# Patient Record
Sex: FEMALE | Race: WHITE | NOT HISPANIC OR LATINO | Employment: FULL TIME | URBAN - METROPOLITAN AREA
[De-identification: names, ages, dates, MRNs, and addresses within clinical notes are randomized per-mention and may not be internally consistent; named-entity substitution may affect disease eponyms.]

---

## 2017-01-10 ENCOUNTER — APPOINTMENT (OUTPATIENT)
Dept: LAB | Facility: HOSPITAL | Age: 32
End: 2017-01-10
Attending: OBSTETRICS & GYNECOLOGY
Payer: COMMERCIAL

## 2017-01-10 DIAGNOSIS — Z34.90 ENCOUNTER FOR SUPERVISION OF NORMAL PREGNANCY: ICD-10-CM

## 2017-01-10 LAB
ABO GROUP BLD: NORMAL
BACTERIA UR QL AUTO: ABNORMAL /HPF
BASOPHILS # BLD AUTO: 0.02 THOUSANDS/ΜL (ref 0–0.1)
BASOPHILS NFR BLD AUTO: 0 % (ref 0–1)
BILIRUB UR QL STRIP: ABNORMAL
BLD GP AB SCN SERPL QL: NEGATIVE
CLARITY UR: CLEAR
COLOR UR: YELLOW
EOSINOPHIL # BLD AUTO: 0.1 THOUSAND/ΜL (ref 0–0.61)
EOSINOPHIL NFR BLD AUTO: 1 % (ref 0–6)
ERYTHROCYTE [DISTWIDTH] IN BLOOD BY AUTOMATED COUNT: 14.3 % (ref 11.6–15.1)
GLUCOSE UR STRIP-MCNC: NEGATIVE MG/DL
HCT VFR BLD AUTO: 37.8 % (ref 34.8–46.1)
HGB BLD-MCNC: 13.1 G/DL (ref 11.5–15.4)
HGB UR QL STRIP.AUTO: ABNORMAL
KETONES UR STRIP-MCNC: NEGATIVE MG/DL
LEUKOCYTE ESTERASE UR QL STRIP: NEGATIVE
LYMPHOCYTES # BLD AUTO: 2.62 THOUSANDS/ΜL (ref 0.6–4.47)
LYMPHOCYTES NFR BLD AUTO: 26 % (ref 14–44)
MCH RBC QN AUTO: 32.4 PG (ref 26.8–34.3)
MCHC RBC AUTO-ENTMCNC: 34.7 G/DL (ref 31.4–37.4)
MCV RBC AUTO: 94 FL (ref 82–98)
MONOCYTES # BLD AUTO: 0.54 THOUSAND/ΜL (ref 0.17–1.22)
MONOCYTES NFR BLD AUTO: 5 % (ref 4–12)
NEUTROPHILS # BLD AUTO: 6.76 THOUSANDS/ΜL (ref 1.85–7.62)
NEUTS SEG NFR BLD AUTO: 68 % (ref 43–75)
NITRITE UR QL STRIP: NEGATIVE
NON-SQ EPI CELLS URNS QL MICRO: ABNORMAL /HPF
NRBC BLD AUTO-RTO: 0 /100 WBCS
PH UR STRIP.AUTO: 6 [PH] (ref 4.5–8)
PLATELET # BLD AUTO: 254 THOUSANDS/UL (ref 149–390)
PMV BLD AUTO: 11 FL (ref 8.9–12.7)
PROT UR STRIP-MCNC: NEGATIVE MG/DL
RBC # BLD AUTO: 4.04 MILLION/UL (ref 3.81–5.12)
RBC #/AREA URNS AUTO: ABNORMAL /HPF
RH BLD: POSITIVE
SP GR UR STRIP.AUTO: 1.01 (ref 1–1.03)
UROBILINOGEN UR QL STRIP.AUTO: 0.2 E.U./DL
WBC # BLD AUTO: 10.04 THOUSAND/UL (ref 4.31–10.16)
WBC #/AREA URNS AUTO: ABNORMAL /HPF

## 2017-01-10 PROCEDURE — 80081 OBSTETRIC PANEL INC HIV TSTG: CPT

## 2017-01-10 PROCEDURE — 36415 COLL VENOUS BLD VENIPUNCTURE: CPT

## 2017-01-10 PROCEDURE — 81001 URINALYSIS AUTO W/SCOPE: CPT

## 2017-01-10 PROCEDURE — 87086 URINE CULTURE/COLONY COUNT: CPT

## 2017-01-11 LAB
HBV SURFACE AG SER QL: NORMAL
HIV 1+2 AB+HIV1 P24 AG SERPL QL IA: NORMAL
RPR SER QL: NORMAL
RUBV IGG SERPL IA-ACNC: 102.7 IU/ML

## 2017-01-12 LAB — BACTERIA UR CULT: NORMAL

## 2017-01-16 ENCOUNTER — ALLSCRIPTS OFFICE VISIT (OUTPATIENT)
Dept: OTHER | Facility: OTHER | Age: 32
End: 2017-01-16

## 2017-01-16 ENCOUNTER — GENERIC CONVERSION - ENCOUNTER (OUTPATIENT)
Dept: OTHER | Facility: OTHER | Age: 32
End: 2017-01-16

## 2017-01-16 PROCEDURE — G0145 SCR C/V CYTO,THINLAYER,RESCR: HCPCS | Performed by: OBSTETRICS & GYNECOLOGY

## 2017-01-17 ENCOUNTER — LAB REQUISITION (OUTPATIENT)
Dept: LAB | Facility: HOSPITAL | Age: 32
End: 2017-01-17
Payer: COMMERCIAL

## 2017-01-17 DIAGNOSIS — Z34.90 ENCOUNTER FOR SUPERVISION OF NORMAL PREGNANCY: ICD-10-CM

## 2017-01-17 PROCEDURE — 87491 CHLMYD TRACH DNA AMP PROBE: CPT | Performed by: OBSTETRICS & GYNECOLOGY

## 2017-01-17 PROCEDURE — 87591 N.GONORRHOEAE DNA AMP PROB: CPT | Performed by: OBSTETRICS & GYNECOLOGY

## 2017-01-18 LAB
CHLAMYDIA DNA CVX QL NAA+PROBE: NORMAL
N GONORRHOEA DNA GENITAL QL NAA+PROBE: NORMAL

## 2017-01-24 LAB
LAB AP GYN PRIMARY INTERPRETATION: NORMAL
LAB AP LMP: NORMAL
Lab: NORMAL
PATH INTERP SPEC-IMP: NORMAL

## 2017-01-25 ENCOUNTER — GENERIC CONVERSION - ENCOUNTER (OUTPATIENT)
Dept: OTHER | Facility: OTHER | Age: 32
End: 2017-01-25

## 2017-02-07 ENCOUNTER — APPOINTMENT (OUTPATIENT)
Dept: LAB | Facility: HOSPITAL | Age: 32
End: 2017-02-07
Attending: OBSTETRICS & GYNECOLOGY
Payer: COMMERCIAL

## 2017-02-07 ENCOUNTER — TRANSCRIBE ORDERS (OUTPATIENT)
Dept: ADMINISTRATIVE | Facility: HOSPITAL | Age: 32
End: 2017-02-07

## 2017-02-07 ENCOUNTER — GENERIC CONVERSION - ENCOUNTER (OUTPATIENT)
Dept: OTHER | Facility: OTHER | Age: 32
End: 2017-02-07

## 2017-02-07 DIAGNOSIS — Z20.828 EXPOSURE TO SARS-ASSOCIATED CORONAVIRUS: Primary | ICD-10-CM

## 2017-02-07 DIAGNOSIS — Z20.828 EXPOSURE TO SARS-ASSOCIATED CORONAVIRUS: ICD-10-CM

## 2017-02-07 PROCEDURE — 36415 COLL VENOUS BLD VENIPUNCTURE: CPT

## 2017-02-07 PROCEDURE — 86747 PARVOVIRUS ANTIBODY: CPT

## 2017-02-08 LAB
B19V IGG SER IA-ACNC: 0.1 INDEX (ref 0–0.8)
B19V IGM SER IA-ACNC: 0.1 INDEX (ref 0–0.8)

## 2017-02-09 ENCOUNTER — GENERIC CONVERSION - ENCOUNTER (OUTPATIENT)
Dept: OTHER | Facility: OTHER | Age: 32
End: 2017-02-09

## 2017-02-13 ENCOUNTER — ALLSCRIPTS OFFICE VISIT (OUTPATIENT)
Dept: OTHER | Facility: OTHER | Age: 32
End: 2017-02-13

## 2017-02-27 ENCOUNTER — GENERIC CONVERSION - ENCOUNTER (OUTPATIENT)
Dept: OTHER | Facility: OTHER | Age: 32
End: 2017-02-27

## 2017-02-27 ENCOUNTER — ALLSCRIPTS OFFICE VISIT (OUTPATIENT)
Dept: PERINATAL CARE | Facility: CLINIC | Age: 32
End: 2017-02-27
Payer: COMMERCIAL

## 2017-02-27 PROCEDURE — 76817 TRANSVAGINAL US OBSTETRIC: CPT | Performed by: OBSTETRICS & GYNECOLOGY

## 2017-02-27 PROCEDURE — 76805 OB US >/= 14 WKS SNGL FETUS: CPT | Performed by: OBSTETRICS & GYNECOLOGY

## 2017-03-08 ENCOUNTER — LAB (OUTPATIENT)
Dept: LAB | Facility: HOSPITAL | Age: 32
End: 2017-03-08
Attending: OBSTETRICS & GYNECOLOGY
Payer: COMMERCIAL

## 2017-03-08 ENCOUNTER — TRANSCRIBE ORDERS (OUTPATIENT)
Dept: ADMINISTRATIVE | Facility: HOSPITAL | Age: 32
End: 2017-03-08

## 2017-03-08 DIAGNOSIS — Z20.828 EXPOSURE TO SARS-ASSOCIATED CORONAVIRUS: ICD-10-CM

## 2017-03-08 DIAGNOSIS — Z20.828 EXPOSURE TO SARS-ASSOCIATED CORONAVIRUS: Primary | ICD-10-CM

## 2017-03-08 PROCEDURE — 36415 COLL VENOUS BLD VENIPUNCTURE: CPT

## 2017-03-08 PROCEDURE — 86747 PARVOVIRUS ANTIBODY: CPT

## 2017-03-10 LAB
B19V IGG SER IA-ACNC: 0.6 INDEX (ref 0–0.8)
B19V IGM SER IA-ACNC: 0.1 INDEX (ref 0–0.8)

## 2017-03-13 ENCOUNTER — GENERIC CONVERSION - ENCOUNTER (OUTPATIENT)
Dept: OTHER | Facility: OTHER | Age: 32
End: 2017-03-13

## 2017-03-13 DIAGNOSIS — Z33.1 PREGNANT STATE, INCIDENTAL: ICD-10-CM

## 2017-04-10 ENCOUNTER — GENERIC CONVERSION - ENCOUNTER (OUTPATIENT)
Dept: OTHER | Facility: OTHER | Age: 32
End: 2017-04-10

## 2017-04-10 ENCOUNTER — ALLSCRIPTS OFFICE VISIT (OUTPATIENT)
Dept: OTHER | Facility: OTHER | Age: 32
End: 2017-04-10

## 2017-04-11 ENCOUNTER — APPOINTMENT (OUTPATIENT)
Dept: LAB | Facility: HOSPITAL | Age: 32
End: 2017-04-11
Attending: OBSTETRICS & GYNECOLOGY
Payer: COMMERCIAL

## 2017-04-11 DIAGNOSIS — Z33.1 PREGNANT STATE, INCIDENTAL: ICD-10-CM

## 2017-04-11 LAB
ERYTHROCYTE [DISTWIDTH] IN BLOOD BY AUTOMATED COUNT: 13.4 % (ref 11.6–15.1)
GLUCOSE 1H P 50 G GLC PO SERPL-MCNC: 115 MG/DL
HCT VFR BLD AUTO: 33.2 % (ref 34.8–46.1)
HGB BLD-MCNC: 11.2 G/DL (ref 11.5–15.4)
MCH RBC QN AUTO: 32 PG (ref 26.8–34.3)
MCHC RBC AUTO-ENTMCNC: 33.7 G/DL (ref 31.4–37.4)
MCV RBC AUTO: 95 FL (ref 82–98)
PLATELET # BLD AUTO: 218 THOUSANDS/UL (ref 149–390)
PMV BLD AUTO: 12.6 FL (ref 8.9–12.7)
RBC # BLD AUTO: 3.5 MILLION/UL (ref 3.81–5.12)
WBC # BLD AUTO: 11.61 THOUSAND/UL (ref 4.31–10.16)

## 2017-04-11 PROCEDURE — 86592 SYPHILIS TEST NON-TREP QUAL: CPT

## 2017-04-11 PROCEDURE — 82950 GLUCOSE TEST: CPT

## 2017-04-11 PROCEDURE — 36415 COLL VENOUS BLD VENIPUNCTURE: CPT

## 2017-04-11 PROCEDURE — 85027 COMPLETE CBC AUTOMATED: CPT

## 2017-04-12 LAB — RPR SER QL: NORMAL

## 2017-04-24 ENCOUNTER — GENERIC CONVERSION - ENCOUNTER (OUTPATIENT)
Dept: OTHER | Facility: OTHER | Age: 32
End: 2017-04-24

## 2017-05-04 ENCOUNTER — GENERIC CONVERSION - ENCOUNTER (OUTPATIENT)
Dept: OTHER | Facility: OTHER | Age: 32
End: 2017-05-04

## 2017-05-09 ENCOUNTER — GENERIC CONVERSION - ENCOUNTER (OUTPATIENT)
Dept: OTHER | Facility: OTHER | Age: 32
End: 2017-05-09

## 2017-05-23 ENCOUNTER — GENERIC CONVERSION - ENCOUNTER (OUTPATIENT)
Dept: OTHER | Facility: OTHER | Age: 32
End: 2017-05-23

## 2017-05-23 ENCOUNTER — ALLSCRIPTS OFFICE VISIT (OUTPATIENT)
Dept: OTHER | Facility: OTHER | Age: 32
End: 2017-05-23

## 2017-06-06 ENCOUNTER — GENERIC CONVERSION - ENCOUNTER (OUTPATIENT)
Dept: OTHER | Facility: OTHER | Age: 32
End: 2017-06-06

## 2017-06-21 ENCOUNTER — ALLSCRIPTS OFFICE VISIT (OUTPATIENT)
Dept: OTHER | Facility: OTHER | Age: 32
End: 2017-06-21

## 2017-06-21 ENCOUNTER — LAB REQUISITION (OUTPATIENT)
Dept: LAB | Facility: HOSPITAL | Age: 32
End: 2017-06-21
Payer: COMMERCIAL

## 2017-06-21 ENCOUNTER — GENERIC CONVERSION - ENCOUNTER (OUTPATIENT)
Dept: OTHER | Facility: OTHER | Age: 32
End: 2017-06-21

## 2017-06-21 DIAGNOSIS — Z34.90 ENCOUNTER FOR SUPERVISION OF NORMAL PREGNANCY: ICD-10-CM

## 2017-06-21 PROCEDURE — 87653 STREP B DNA AMP PROBE: CPT | Performed by: OBSTETRICS & GYNECOLOGY

## 2017-06-22 ENCOUNTER — GENERIC CONVERSION - ENCOUNTER (OUTPATIENT)
Dept: OTHER | Facility: OTHER | Age: 32
End: 2017-06-22

## 2017-06-23 LAB — GP B STREP DNA SPEC QL NAA+PROBE: NORMAL

## 2017-06-27 ENCOUNTER — GENERIC CONVERSION - ENCOUNTER (OUTPATIENT)
Dept: OTHER | Facility: OTHER | Age: 32
End: 2017-06-27

## 2017-07-03 ENCOUNTER — HOSPITAL ENCOUNTER (INPATIENT)
Facility: HOSPITAL | Age: 32
LOS: 2 days | Discharge: HOME | End: 2017-07-05
Attending: OBSTETRICS & GYNECOLOGY | Admitting: OBSTETRICS & GYNECOLOGY
Payer: COMMERCIAL

## 2017-07-03 ENCOUNTER — GENERIC CONVERSION - ENCOUNTER (OUTPATIENT)
Dept: OTHER | Facility: OTHER | Age: 32
End: 2017-07-03

## 2017-07-03 DIAGNOSIS — Z3A.38 38 WEEKS GESTATION OF PREGNANCY: Primary | ICD-10-CM

## 2017-07-03 LAB
ABO GROUP BLD: NORMAL
BASE EXCESS BLDCOA CALC-SCNC: -6.8 MMOL/L (ref 3–11)
BASE EXCESS BLDCOV CALC-SCNC: -3.6 MMOL/L (ref 1–9)
BASOPHILS # BLD AUTO: 0.03 THOUSANDS/ΜL (ref 0–0.1)
BASOPHILS NFR BLD AUTO: 0 % (ref 0–1)
BLD GP AB SCN SERPL QL: NEGATIVE
EOSINOPHIL # BLD AUTO: 0.11 THOUSAND/ΜL (ref 0–0.61)
EOSINOPHIL NFR BLD AUTO: 1 % (ref 0–6)
ERYTHROCYTE [DISTWIDTH] IN BLOOD BY AUTOMATED COUNT: 15.3 % (ref 11.6–15.1)
HCO3 BLDCOA-SCNC: 22.5 MMOL/L (ref 17.3–27.3)
HCO3 BLDCOV-SCNC: 21.9 MMOL/L (ref 12.2–28.6)
HCT VFR BLD AUTO: 29.5 % (ref 34.8–46.1)
HGB BLD-MCNC: 9.8 G/DL (ref 11.5–15.4)
LYMPHOCYTES # BLD AUTO: 3.37 THOUSANDS/ΜL (ref 0.6–4.47)
LYMPHOCYTES NFR BLD AUTO: 21 % (ref 14–44)
MCH RBC QN AUTO: 28.1 PG (ref 26.8–34.3)
MCHC RBC AUTO-ENTMCNC: 33.2 G/DL (ref 31.4–37.4)
MCV RBC AUTO: 85 FL (ref 82–98)
MONOCYTES # BLD AUTO: 0.87 THOUSAND/ΜL (ref 0.17–1.22)
MONOCYTES NFR BLD AUTO: 5 % (ref 4–12)
NEUTROPHILS # BLD AUTO: 11.7 THOUSANDS/ΜL (ref 1.85–7.62)
NEUTS SEG NFR BLD AUTO: 73 % (ref 43–75)
NRBC BLD AUTO-RTO: 0 /100 WBCS
O2 CT VFR BLDCOA CALC: 11.7 ML/DL
OXYHGB MFR BLDCOA: 51.9 %
OXYHGB MFR BLDCOV: 65.9 %
PCO2 BLDCOA: 60.4 MM[HG] (ref 30–60)
PCO2 BLDCOV: 41 MM HG (ref 27–43)
PH BLDCOA: 7.19 [PH] (ref 7.23–7.43)
PH BLDCOV: 7.34 [PH] (ref 7.19–7.49)
PLATELET # BLD AUTO: 293 THOUSANDS/UL (ref 149–390)
PMV BLD AUTO: 10.8 FL (ref 8.9–12.7)
PO2 BLDCOA: 28.9 MM HG (ref 5–25)
PO2 BLDCOV: 30.3 MM HG (ref 15–45)
RBC # BLD AUTO: 3.49 MILLION/UL (ref 3.81–5.12)
RH BLD: POSITIVE
SAO2 % BLDCOV: 15.9 ML/DL
SPECIMEN EXPIRATION DATE: NORMAL
WBC # BLD AUTO: 16.08 THOUSAND/UL (ref 4.31–10.16)

## 2017-07-03 PROCEDURE — 85025 COMPLETE CBC W/AUTO DIFF WBC: CPT | Performed by: OBSTETRICS & GYNECOLOGY

## 2017-07-03 PROCEDURE — 82805 BLOOD GASES W/O2 SATURATION: CPT | Performed by: OBSTETRICS & GYNECOLOGY

## 2017-07-03 PROCEDURE — 86901 BLOOD TYPING SEROLOGIC RH(D): CPT | Performed by: OBSTETRICS & GYNECOLOGY

## 2017-07-03 PROCEDURE — 86900 BLOOD TYPING SEROLOGIC ABO: CPT | Performed by: OBSTETRICS & GYNECOLOGY

## 2017-07-03 PROCEDURE — 0HQ9XZZ REPAIR PERINEUM SKIN, EXTERNAL APPROACH: ICD-10-PCS | Performed by: OBSTETRICS & GYNECOLOGY

## 2017-07-03 PROCEDURE — 86850 RBC ANTIBODY SCREEN: CPT | Performed by: OBSTETRICS & GYNECOLOGY

## 2017-07-03 PROCEDURE — 86592 SYPHILIS TEST NON-TREP QUAL: CPT | Performed by: OBSTETRICS & GYNECOLOGY

## 2017-07-03 PROCEDURE — 99203 OFFICE O/P NEW LOW 30 MIN: CPT

## 2017-07-03 RX ORDER — CALCIUM CARBONATE 200(500)MG
1000 TABLET,CHEWABLE ORAL DAILY PRN
Status: DISCONTINUED | OUTPATIENT
Start: 2017-07-03 | End: 2017-07-05 | Stop reason: HOSPADM

## 2017-07-03 RX ORDER — SIMETHICONE 80 MG
80 TABLET,CHEWABLE ORAL 4 TIMES DAILY PRN
Status: DISCONTINUED | OUTPATIENT
Start: 2017-07-03 | End: 2017-07-05 | Stop reason: HOSPADM

## 2017-07-03 RX ORDER — ACETAMINOPHEN 325 MG/1
650 TABLET ORAL EVERY 6 HOURS PRN
Status: DISCONTINUED | OUTPATIENT
Start: 2017-07-03 | End: 2017-07-05 | Stop reason: HOSPADM

## 2017-07-03 RX ORDER — OXYCODONE HYDROCHLORIDE AND ACETAMINOPHEN 5; 325 MG/1; MG/1
2 TABLET ORAL EVERY 4 HOURS PRN
Status: DISCONTINUED | OUTPATIENT
Start: 2017-07-03 | End: 2017-07-05 | Stop reason: HOSPADM

## 2017-07-03 RX ORDER — LIDOCAINE HYDROCHLORIDE 10 MG/ML
INJECTION, SOLUTION INFILTRATION; PERINEURAL
Status: DISPENSED
Start: 2017-07-03 | End: 2017-07-04

## 2017-07-03 RX ORDER — DOCUSATE SODIUM 100 MG/1
100 CAPSULE, LIQUID FILLED ORAL 2 TIMES DAILY
Status: DISCONTINUED | OUTPATIENT
Start: 2017-07-03 | End: 2017-07-05 | Stop reason: HOSPADM

## 2017-07-03 RX ORDER — ACETAMINOPHEN 325 MG/1
650 TABLET ORAL EVERY 6 HOURS PRN
Status: DISCONTINUED | OUTPATIENT
Start: 2017-07-03 | End: 2017-07-03

## 2017-07-03 RX ORDER — BUPIVACAINE HYDROCHLORIDE 2.5 MG/ML
INJECTION, SOLUTION EPIDURAL; INFILTRATION; INTRACAUDAL
Status: DISCONTINUED
Start: 2017-07-03 | End: 2017-07-03 | Stop reason: WASHOUT

## 2017-07-03 RX ORDER — CALCIUM CARBONATE 200(500)MG
1000 TABLET,CHEWABLE ORAL 3 TIMES DAILY PRN
Status: DISCONTINUED | OUTPATIENT
Start: 2017-07-03 | End: 2017-07-03

## 2017-07-03 RX ORDER — SODIUM CHLORIDE, SODIUM LACTATE, POTASSIUM CHLORIDE, CALCIUM CHLORIDE 600; 310; 30; 20 MG/100ML; MG/100ML; MG/100ML; MG/100ML
125 INJECTION, SOLUTION INTRAVENOUS CONTINUOUS
Status: DISCONTINUED | OUTPATIENT
Start: 2017-07-03 | End: 2017-07-03

## 2017-07-03 RX ORDER — OXYTOCIN/RINGER'S LACTATE 30/500 ML
250 PLASTIC BAG, INJECTION (ML) INTRAVENOUS CONTINUOUS
Status: DISCONTINUED | OUTPATIENT
Start: 2017-07-03 | End: 2017-07-05 | Stop reason: HOSPADM

## 2017-07-03 RX ORDER — DIAPER,BRIEF,INFANT-TODD,DISP
1 EACH MISCELLANEOUS AS NEEDED
Status: DISCONTINUED | OUTPATIENT
Start: 2017-07-03 | End: 2017-07-05 | Stop reason: HOSPADM

## 2017-07-03 RX ORDER — OXYTOCIN/RINGER'S LACTATE 30/500 ML
PLASTIC BAG, INJECTION (ML) INTRAVENOUS
Status: COMPLETED
Start: 2017-07-03 | End: 2017-07-03

## 2017-07-03 RX ORDER — OXYCODONE HYDROCHLORIDE AND ACETAMINOPHEN 5; 325 MG/1; MG/1
1 TABLET ORAL EVERY 4 HOURS PRN
Status: DISCONTINUED | OUTPATIENT
Start: 2017-07-03 | End: 2017-07-05 | Stop reason: HOSPADM

## 2017-07-03 RX ORDER — ONDANSETRON 2 MG/ML
4 INJECTION INTRAMUSCULAR; INTRAVENOUS EVERY 8 HOURS PRN
Status: DISCONTINUED | OUTPATIENT
Start: 2017-07-03 | End: 2017-07-05 | Stop reason: HOSPADM

## 2017-07-03 RX ORDER — IBUPROFEN 600 MG/1
600 TABLET ORAL EVERY 6 HOURS PRN
Status: DISCONTINUED | OUTPATIENT
Start: 2017-07-03 | End: 2017-07-05 | Stop reason: HOSPADM

## 2017-07-03 RX ADMIN — DOCUSATE SODIUM 100 MG: 100 CAPSULE, LIQUID FILLED ORAL at 17:15

## 2017-07-03 RX ADMIN — Medication 250 UNITS: at 13:06

## 2017-07-03 RX ADMIN — WITCH HAZEL 1 PAD: 500 SOLUTION RECTAL; TOPICAL at 16:57

## 2017-07-03 RX ADMIN — Medication 1000 MG: at 07:23

## 2017-07-03 RX ADMIN — BENZOCAINE AND MENTHOL: 20; .5 SPRAY TOPICAL at 16:57

## 2017-07-03 RX ADMIN — Medication 1000 MG: at 01:46

## 2017-07-03 RX ADMIN — IBUPROFEN 600 MG: 600 TABLET, FILM COATED ORAL at 17:15

## 2017-07-03 RX ADMIN — Medication 1000 MG: at 19:07

## 2017-07-04 RX ADMIN — IBUPROFEN 600 MG: 600 TABLET, FILM COATED ORAL at 05:00

## 2017-07-04 RX ADMIN — IBUPROFEN 600 MG: 600 TABLET, FILM COATED ORAL at 16:15

## 2017-07-04 RX ADMIN — DOCUSATE SODIUM 100 MG: 100 CAPSULE, LIQUID FILLED ORAL at 18:34

## 2017-07-04 RX ADMIN — ACETAMINOPHEN 650 MG: 325 TABLET, FILM COATED ORAL at 19:24

## 2017-07-05 VITALS
HEART RATE: 95 BPM | OXYGEN SATURATION: 97 % | WEIGHT: 156 LBS | SYSTOLIC BLOOD PRESSURE: 121 MMHG | HEIGHT: 62 IN | DIASTOLIC BLOOD PRESSURE: 77 MMHG | TEMPERATURE: 98.2 F | RESPIRATION RATE: 18 BRPM | BODY MASS INDEX: 28.71 KG/M2

## 2017-07-05 LAB — RPR SER QL: NORMAL

## 2017-07-05 RX ADMIN — IBUPROFEN 600 MG: 600 TABLET, FILM COATED ORAL at 07:36

## 2017-07-05 RX ADMIN — DOCUSATE SODIUM 100 MG: 100 CAPSULE, LIQUID FILLED ORAL at 07:34

## 2017-07-12 LAB — PLACENTA IN STORAGE: NORMAL

## 2017-08-21 ENCOUNTER — GENERIC CONVERSION - ENCOUNTER (OUTPATIENT)
Dept: OTHER | Facility: OTHER | Age: 32
End: 2017-08-21

## 2017-08-21 ENCOUNTER — ALLSCRIPTS OFFICE VISIT (OUTPATIENT)
Dept: OTHER | Facility: OTHER | Age: 32
End: 2017-08-21

## 2017-09-13 ENCOUNTER — GENERIC CONVERSION - ENCOUNTER (OUTPATIENT)
Dept: OTHER | Facility: OTHER | Age: 32
End: 2017-09-13

## 2017-09-13 ENCOUNTER — LAB REQUISITION (OUTPATIENT)
Dept: LAB | Facility: HOSPITAL | Age: 32
End: 2017-09-13
Payer: COMMERCIAL

## 2017-09-13 DIAGNOSIS — R87.619 ABNORMAL CYTOLOGICAL FINDING IN SPECIMEN FROM CERVIX UTERI: ICD-10-CM

## 2017-09-13 DIAGNOSIS — R87.611 ATYPICAL SQUAMOUS CELLS CANNOT EXCLUDE HIGH GRADE SQUAMOUS INTRAEPITHELIAL LESION ON CYTOLOGIC SMEAR OF CERVIX (ASC-H): ICD-10-CM

## 2017-09-13 PROCEDURE — 88342 IMHCHEM/IMCYTCHM 1ST ANTB: CPT | Performed by: OBSTETRICS & GYNECOLOGY

## 2017-09-13 PROCEDURE — 88305 TISSUE EXAM BY PATHOLOGIST: CPT | Performed by: OBSTETRICS & GYNECOLOGY

## 2017-09-13 PROCEDURE — 88341 IMHCHEM/IMCYTCHM EA ADD ANTB: CPT | Performed by: OBSTETRICS & GYNECOLOGY

## 2017-09-25 ENCOUNTER — GENERIC CONVERSION - ENCOUNTER (OUTPATIENT)
Dept: OTHER | Facility: OTHER | Age: 32
End: 2017-09-25

## 2017-10-24 ENCOUNTER — ALLSCRIPTS OFFICE VISIT (OUTPATIENT)
Dept: OTHER | Facility: OTHER | Age: 32
End: 2017-10-24

## 2017-10-24 LAB — HCG, QUALITATIVE (HISTORICAL): NEGATIVE

## 2017-10-24 PROCEDURE — 88307 TISSUE EXAM BY PATHOLOGIST: CPT | Performed by: OBSTETRICS & GYNECOLOGY

## 2017-10-24 PROCEDURE — 88344 IMHCHEM/IMCYTCHM EA MLT ANTB: CPT | Performed by: OBSTETRICS & GYNECOLOGY

## 2017-10-26 ENCOUNTER — LAB REQUISITION (OUTPATIENT)
Dept: LAB | Facility: HOSPITAL | Age: 32
End: 2017-10-26
Payer: COMMERCIAL

## 2017-10-26 DIAGNOSIS — D06.9 CARCINOMA IN SITU OF CERVIX: ICD-10-CM

## 2017-10-30 ENCOUNTER — GENERIC CONVERSION - ENCOUNTER (OUTPATIENT)
Dept: OTHER | Facility: OTHER | Age: 32
End: 2017-10-30

## 2017-11-09 ENCOUNTER — GENERIC CONVERSION - ENCOUNTER (OUTPATIENT)
Dept: OTHER | Facility: OTHER | Age: 32
End: 2017-11-09

## 2017-11-14 ENCOUNTER — GENERIC CONVERSION - ENCOUNTER (OUTPATIENT)
Dept: OTHER | Facility: OTHER | Age: 32
End: 2017-11-14

## 2017-12-26 ENCOUNTER — ALLSCRIPTS OFFICE VISIT (OUTPATIENT)
Dept: OTHER | Facility: OTHER | Age: 32
End: 2017-12-26

## 2017-12-26 LAB — HCG, QUALITATIVE (HISTORICAL): NEGATIVE

## 2018-01-04 ENCOUNTER — ALLSCRIPTS OFFICE VISIT (OUTPATIENT)
Dept: OTHER | Facility: OTHER | Age: 33
End: 2018-01-04

## 2018-01-05 NOTE — PROGRESS NOTES
Assessment   1  Rectus diastasis (338 84) (M62 08)    Plan   Rectus diastasis    · 1 - Jo Ann WALTERS, Jessica Drake  (General Surgery) Co-Management  *  Status: Active     Requested for: 59RUJ1167  Care Summary provided  : Yes    Discussion/Summary      Discussed diagnostic and treatment options with patient  Patient is being referred to Dr Sky Bobby, general surgeon for further evaluation and treatment  Patient to avoid heavy lifting until further evaluation  Patient to return to the office p r n       The treatment plan was reviewed with the patient/guardian  The patient/guardian understands and agrees with the treatment plan      Chief Complaint   Discuss Hernia      History of Present Illness   Abdominal Pain (Brief): The patient is being seen for an initial evaluation of this episode of abdominal pain  Symptoms: no nausea,-- no vomiting,-- no diarrhea-- and-- no anorexia--       The patient presents with complaints of intermittent episodes of epigastric abdominal pain, described as dull and aching, non-radiating  Episodes started 1 year ago  Symptoms are unchanged  Associated Symptoms: no abdominal bloating,-- no fever,-- no chills,-- no weight loss,-- no jaundice,-- no hematemesis,-- no melena-- and-- no bloody stools  HPI: Patient delivered a baby boy a 6 months ago and was told she had an umbilical or ventral hernia during the pregnancy  She now complains of tender lump in the mid to upper abdomen which becomes more pronounced with exercising such as sit-ups or crunches  Active Problems   1  Atypical glandular cells of undetermined significance (ARNULFO) on cervical Pap smear     (795 00) (R87 619)   2  Birth control counseling (V25 09) (Z30 09)   3  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)   4  Encounter for insertion of mirena IUD (V25 11) (Z30 430)   5  Gastroesophageal reflux in pregnancy (646 80,530 81) (O99 619,K21 9)   6  Generalized anxiety disorder (300 02) (F41 1)   7   Pap smear of cervix with ASCUS, cannot exclude HGSIL (795 02) (R87 611)    Past Medical History   1  History of Acute upper respiratory infection (465 9) (J06 9)   2  History of Adnexal fullness (625 8) (N94 9)   3  History of Arthralgia of toe, unspecified laterality (719 47) (M25 579)   4  History of Bacterial vaginosis (616 10,041 9) (N76 0,B96 89)   5  History of Dysuria (788 1) (R30 0)   6  History of Encounter for cervical Pap smear with pelvic exam (V76 2,V72 31) (Z01 419)   7  History of Encounter for contraceptive management (V25 9) (Z30 9)   8  History of Encounter for fetal anatomic survey (V28 81) (Z36 89)   9  History of Encounter for initial prescription of contraceptives (V25 02) (Z30 019)   10  History of Encounter for routine gynecological examination (V72 31) (Z01 419)   11  History of Encounter for screening for risk of pre-term labor (V28 82) (Z36 86)   12  History of Exposure to parvovirus (V01 79) (Z20 828)   13  History of acute pharyngitis (V12 69) (Z87 09)   14  History of acute sinusitis (V12 69) (Z87 09)   15  History of amenorrhea (V13 29) (Z87 42)   16  History of conjunctivitis (V12 49) (Z86 69)   17  History of fatigue (V13 89) (Z87 898)   18  History of Impacted cerumen, unspecified laterality (380 4) (H61 20)   19  History of Neck pain (723 1) (M54 2)   20  History of Neck Strain (847 0)   21  History of Nonspecific abnormal finding (796 9) (R68 89)   22  History of Normal pregnancy (V22 2) (Z34 90)   23  History of Pregnancy, obstetrical care (V22 1) (Z34 90)   24  History of Prenatal care, first pregnancy (V22 0) (Z34 00)   25  History of Second trimester pregnancy (V22 2) (Z34 92)   26  History of Summary Of Previous Pregnancies  1  (Total No )   27  History of Summary Of Previous Pregnancies Para 1  (Deliveries)   28  History of Vaginal candidiasis (112 1) (B37 3)   29  History of Vulvar odor (625 8) (N94 89)    Family History   Father    1  Family history of Hypertension (V17 49)   2   Family history of Pure Hypercholesterolemia  Maternal Grandmother    3  Family history of Diabetes Mellitus (V18 0)  Paternal Grandmother    4  Family history of Diabetes Mellitus (V18 0)  Maternal Grandfather    5  Family history of Heart Disease (V17 49)  Paternal Grandfather    6  Family history of Heart Disease (V17 49)  Family History    7  Family history of Diabetes Mellitus (250 00)    Social History    · Denied: Alcohol   · Caffeine Use   · Contraceptive vaginal ring   · Denied: History of Drug Use   · Marital History - Currently    · Never A Smoker   · One child   · [address]   · Uses Safety Equipment - Seatbelts    Surgical History   1  History of Cervical Loop Electrosurgical Excision (LEEP)   2  History of Colposcopy Cervix With Biopsy(S) With Endocervical Curettage   3  History of Oral Surgery Tooth Extraction   4  History of Wrist Surgery    Current Meds    1  No Reported Medications Recorded    Allergies   1  No Known Drug Allergies  2  Seasonal    Vitals    Recorded: 22KWN6773 04:03PM Recorded: 87FMG8933 03:36PM   Temperature  98 9 F   Heart Rate 72    Respiration 16    Systolic  684   Diastolic  80   Height  5 ft 1 5 in   Weight  123 lb    BMI Calculated  22 86   BSA Calculated  1 55     Physical Exam        Constitutional      General appearance: No acute distress, well appearing and well nourished  Eyes      Conjunctiva and lids: No swelling, erythema or discharge  Ears, Nose, Mouth, and Throat      Oropharynx: Normal with no erythema, edema, exudate or lesions  Pulmonary      Respiratory effort: No increased work of breathing or signs of respiratory distress  Auscultation of lungs: Clear to auscultation  Cardiovascular      Auscultation of heart: Normal rate and rhythm, normal S1 and S2, without murmurs         Examination of extremities for edema and/or varicosities: Normal        Abdomen      Abdomen: Abnormal  -- Positive rectus diastasis versus ventral hernia with slight tenderness with Valsalva  Small umbilical hernia nontender  Lymphatic      Palpation of lymph nodes in neck: No lymphadenopathy  Musculoskeletal      Gait and station: Normal        Inspection/palpation of joints, bones, and muscles: Normal        Skin      Skin and subcutaneous tissue: Normal without rashes or lesions  Psychiatric      Orientation to person, place, and time: Normal        Mood and affect: Normal           Results/Data   PHQ-2 Adult Depression Screening 21CHH0097 03:42PM User, s      Test Name Result Flag Reference   PHQ-2 Adult Depression Score 0     Over the last two weeks, how often have you been bothered by any of the following problems? Little interest or pleasure in doing things: Not at all - 0     Feeling down, depressed, or hopeless: Not at all - 0   PHQ-2 Adult Depression Screening Negative          Future Appointments      Date/Time Provider Specialty Site   01/30/2018 04:15 PM LASHON Nur   Obstetrics/Gynecology [facility]     Signatures    Electronically signed by : Louie Curtis DO; Jan 4 2018  4:05PM EST                       (Author)

## 2018-01-10 NOTE — PROGRESS NOTES
Active Problems    1  Atypical glandular cells of undetermined significance (ARNULFO) on cervical Pap smear   (795 00) (R87 619)   2  Encounter for fetal anatomic survey (V28 81) (Z36)   3  Encounter for screening for risk of pre-term labor (V28 82) (Z36)   4  Exposure to parvovirus (V01 79) (Z20 828)   5  Gastroesophageal reflux in pregnancy (646 80,530 81) (O99 619,K21 9)   6  Generalized anxiety disorder (300 02) (F41 1)   7  Pap smear of cervix with ASCUS, cannot exclude HGSIL (795 02) (R87 611)   8  Pregnancy, obstetrical care (V22 1) (Z34 90)   9  Second trimester pregnancy (V22 2) (Z33 1)    Current Meds    1  Famotidine 20 MG Oral Tablet; TAKE 1 TABLET EVERY 12 HOURS DAILY; Therapy: 05NXL8735 to (Evaluate:11Jun2017)  Requested for: 44CBR0005; Last   Rx:13Mar2017 Ordered    2  PreNatal  MG Oral Capsule; Therapy: 48NAL1659 to Recorded    Allergies    1  No Known Drug Allergies    2  Seasonal    Results/Data  72957 Abdominal Ultrasound OB José Luis Smack:   Procedure: The study was done today in the office  85443  Indication: EDC gestational age 35w2d with an SURINDER of 7/15/2017 weeks  Exam indication: small for dates  Findings:   Amniotic fluid volume: 43 3mm + 22 9 + 20 4 + 36 9 = 123 5mm  Fetal heart beat: 146bpm    Placental location: anterior/fundal    Fetal position: vertex  Impression:     BPD 84 6mm 34w1d       6mm 34w1d   4mm 33w6d    FL 64 7mm 33w6d    HL 57 6mm 33w3d    The fetal anatomy has been previously assessed, however today's imaged anatomy including fetal kidneys, stomach, bladder, and diaphragm appear within normal limits  EFW 2280g 5lb 0oz     Overall Impression: Appropriate interval growth  33w6d with an SURINDER of 7/5/2017  Future Appointments    Date/Time Provider Specialty Site   05/23/2017 01:00 PM LASHON Fermin  Obstetrics/Gynecology [facility]   06/06/2017 04:00 PM LASHON Fermin   Obstetrics/Gynecology [facility] 06/13/2017 04:00 PM LASHON Hdez   Obstetrics/Gynecology [facility]   06/20/2017 01:00 PM Richa Snyder MD Obstetrics/Gynecology [facility]   06/27/2017 01:15 PM Richa Snyder MD Obstetrics/Gynecology [facility]   05/23/2017 12:30 PM [facility], [facility]     Signatures   Electronically signed by : Tashia Mendosa, ; May 23 2017  1:08PM EST                       (Author)    Electronically signed by : LASHON Donnelly ; May 23 2017  4:49PM EST

## 2018-01-10 NOTE — MISCELLANEOUS
Message   Recorded as Task   Date: 02/09/2017 10:59 AM, Created By: Damir Benavidez   Task Name: Go to Result   Assigned To: Michel Machado   Regarding Patient: Kimani Rosenbuam, Status: Active   CommentRik Terell - 09 Feb 2017 10:59 AM     TASK CREATED  Parvovirus IgG and IgM are negative, indicative of no immunity and no evidence of infection  If fifth disease exposure is significant, we should repeat this testing in about 3 weeks' time  Please inform the patient   Kelly Corrales - 09 Feb 2017 12:12 PM     TASK REPLIED TO: Previously Assigned To Ky [place]  script sent to pt to repeat in 3 weeks        Active Problems    1  Exposure to parvovirus (V01 79) (Z20 828)   2  Generalized anxiety disorder (300 02) (F41 1)   3  Pregnancy, obstetrical care (V22 1) (Z34 90)   4  Second trimester pregnancy (V22 2) (Z33 1)    Current Meds   1  ALPRAZolam 0 25 MG Oral Tablet; 1 tablet every 8 hours as needed for anxiety; Therapy: 04UJA3643 to (Last Rx:12Oct2016) Ordered   2  PreNatal  MG Oral Capsule; Therapy: 01ASH5605 to Recorded    Allergies    1  No Known Drug Allergies    2  Seasonal    Plan  Exposure to parvovirus    · (Q) PARVOVIRUS B-19 ANTIBODIES (IGG, IGM); Status:Active - Retrospective  Authorization;  Requested QWS:[id number];     Signatures   Electronically signed by : Bandar Tapia, ; Feb 9 2017 12:12PM EST                       (Author)

## 2018-01-11 NOTE — PROGRESS NOTES
Active Problems    1  Atypical glandular cells of undetermined significance (ARNULFO) on cervical Pap smear   (795 00) (R87 619)   2  Encounter for screening for risk of pre-term labor (V28 82) (Z36)   3  Exposure to parvovirus (V01 79) (Z20 828)   4  Gastroesophageal reflux in pregnancy (646 80,530 81) (O99 619,K21 9)   5  Generalized anxiety disorder (300 02) (F41 1)   6  Pap smear of cervix with ASCUS, cannot exclude HGSIL (795 02) (R87 611)   7  Pregnancy, obstetrical care (V22 1) (Z34 90)    Current Meds    1  Famotidine 20 MG Oral Tablet; TAKE 1 TABLET EVERY 12 HOURS DAILY; Therapy: 96OUY5983 to (Evaluate:11Jun2017)  Requested for: 45KLB5023; Last   Rx:13Mar2017 Ordered    2  PreNatal  MG Oral Capsule; Therapy: 34YSJ7342 to Recorded    Allergies    1  No Known Drug Allergies    2  Seasonal    Results/Data  09800 Abdominal Ultrasound OB Guillermina Rick:   Procedure: The study was done today in the office  66082  Indication: EDC gestational age 37w2d with an SURINDER 65/36 weeks  Exam indication: Small for dates  Findings:   Amniotic fluid volume: 18 4 + 17 2 + 3 7 + 30 7 = 70 0mm  Fetal heart beat: 163bpm    Placental location: anterior  Fetal position: vertex  Impression:   BPD 88 9mm 36w0d     0mm 36w5d     8mm 36w3d    FL 70 1mm 36w0d  HL 63 2mm 36w4d    The fetal anatomy has been previously assessed, however today's imaged anatomy including fetal kidneys, stomach, bladder, and diaphragm appear within normal limits  EFW 2892g 6lb 6oz 44%    Overall Impression: Appropriate interval growth  36w2d with an SURINDER of 7/17/2017 by today's ultrasound  Future Appointments    Date/Time Provider Specialty Site   06/27/2017 01:15 PM Dallin Motta MD Obstetrics/Gynecology [facility]     Signatures   Electronically signed by : Ronal Reyez, ; Jun 21 2017 10:01AM EST                       (Author)    Electronically signed by :  Arielle De León MD; Jun 21 2017 10:34AM EST (Author)

## 2018-01-12 VITALS
BODY MASS INDEX: 23.28 KG/M2 | SYSTOLIC BLOOD PRESSURE: 116 MMHG | DIASTOLIC BLOOD PRESSURE: 78 MMHG | HEIGHT: 62 IN | WEIGHT: 126.5 LBS

## 2018-01-13 NOTE — MISCELLANEOUS
Message   Date: 15 Nov 2016 3:23 PM EST, Recorded By: Cassidy Valentine For: Onofre Sharpyer: Pablo Martinez, Self   Phone: [phone] (Home), [phone] (Work)   Reason: Other   Patient called to report + HPT, [facility], LMP 10/8/16, no c/o, advised start PNV  Will schedule u/s and cc for 12/12/16  Active Problems    1  Acute pharyngitis (462) (J02 9)   2  Acute sinusitis (461 9) (J01 90)   3  Acute upper respiratory infection (465 9) (J06 9)   4  Amenorrhea (626 0) (N91 2)   5  Arthralgia of toe, unspecified laterality (719 47) (M25 579)   6  Conjunctivitis (372 30) (H10 9)   7  Contraception (V25 9) (Z30 9)   8  Dysuria (788 1) (R30 0)   9  Encounter for cervical Pap smear with pelvic exam (V76 2,V72 31) (Z01 419)   10  Encounter for initial prescription of contraceptives (V25 02) (Z30 019)   11  Encounter for routine gynecological examination (V72 31) (Z01 419)   12  Fatigue (780 79) (R53 83)   13  Generalized anxiety disorder (300 02) (F41 1)   14  Denied: History of self breast exam   15  Impacted cerumen, unspecified laterality (380 4) (H61 20)   16  Neck pain (723 1) (M54 2)   17  Neck Strain (847 0)   18  Nonspecific abnormal finding (796 9) (R68 89)   19  Vaginal candidiasis (112 1) (B37 3)    Current Meds   1  ALPRAZolam 0 25 MG Oral Tablet; 1 tablet every 8 hours as needed for anxiety; Therapy: 37WMQ6132 to (Last Rx:12Oct2016) Ordered   2  Azithromycin 250 MG Oral Tablet; TAKE 2 TABLETS ON DAY 1 THEN TAKE 1 TABLET A   DAY FOR 4 DAYS; Therapy: 01Gru0182 to (Last Rx:09Gna9951)  Requested for: 16Zob6821 Ordered   3  NuvaRing 0 12-0 015 MG/24HR Vaginal Ring; insert 1 ring vaginally for 3 weeks; Therapy: 91UTA6543 to (95 710689)  Requested for: 56ONM4327; Last   Rx:29Mar2016 Ordered    Allergies    1  No Known Drug Allergies    2   Seasonal    Signatures   Electronically signed by : Grant Husain, ; Nov 15 2016  3:25PM EST (Author)

## 2018-01-14 VITALS
HEIGHT: 62 IN | WEIGHT: 129.6 LBS | DIASTOLIC BLOOD PRESSURE: 78 MMHG | BODY MASS INDEX: 23.85 KG/M2 | SYSTOLIC BLOOD PRESSURE: 118 MMHG

## 2018-01-14 VITALS
DIASTOLIC BLOOD PRESSURE: 78 MMHG | SYSTOLIC BLOOD PRESSURE: 128 MMHG | WEIGHT: 121 LBS | HEIGHT: 62 IN | BODY MASS INDEX: 22.26 KG/M2

## 2018-01-15 NOTE — RESULT NOTES
Verified Results  Rapid StrepA- POC 47YVM7001 04:08PM Tiffanie Lane     Test Name Result Flag Reference   Rapid Strep Negative

## 2018-01-15 NOTE — PROGRESS NOTES
Active Problems    1  Encounter for cervical Pap smear with pelvic exam (V76 2,V72 31) (Z01 419)   2  Encounter for initial prescription of contraceptives (V25 02) (Z30 019)   3  Encounter for routine gynecological examination (V72 31) (Z01 419)   4  Generalized anxiety disorder (300 02) (F41 1)   5  Denied: History of self breast exam   6  Pregnancy, obstetrical care (V22 1) (Z34 90)    Current Meds    1  ALPRAZolam 0 25 MG Oral Tablet; 1 tablet every 8 hours as needed for anxiety; Therapy: 04GYY1449 to (Last Rx:12Oct2016) Ordered    2  PreNatal  MG Oral Capsule; Therapy: 93EPE9466 to Recorded    Allergies    1  No Known Drug Allergies    2  Seasonal    Results/Data  19861 Transvaginal Ultrasound OB [facility]:   Procedure: [id number] pregnant uterus real time with image documentation, fetal and maternal evaluation, first trimester (<14 weeks 0 days), transvaginal approach: single or first gestation  The study was done today in the office  Indication: EDC gestational age 6w7d with an SURINDER of 7/19/2017 weeks  Exam indication: New OB  Findings:   Number of gestational sacs and fetuses: One gestational sac containing one embryo  Gestational sac/fetal measurements appropriate for gestation:   CRL= 34 2mm  YS = 4 3mm  FHR = 157bpm    Survey of visible fetal and placental anatomic structure within normal limits  Qualitative assessment of amniotic fluid volume/gestational sac shape: within normal limits  Exam of maternal uterus and adnexa: within normal limits  Impression: Single, viable IUP c/w dates  10w2d with an SURINDER of 7/15/2017 by today's ultrasound        Signatures   Electronically signed by : Dina Fowler, ; Dec 19 2016  9:02AM EST                       (Author)    Electronically signed by : LASHON Dupree ; Dec 19 2016 12:42PM EST

## 2018-01-15 NOTE — MISCELLANEOUS
Message   Recorded as Task   Date: 01/25/2017 10:58 AM, Created By: Ramesh Thornton   Task Name: Care Coordination   Assigned To: Antonio Bean   Regarding Patient: Monique Parker, Status: Active   CommentJefelirivera Jacob - 25 Jan 2017 10:58 AM     TASK CREATED  pt needs a COLPOSCOPY ASAP  she is pregnant   [facility] - 25 Jan 2017 11:13 AM     TASK REASSIGNED: Previously Assigned To Delilah Pineda - 25 Jan 2017 1:11 PM     TASK EDITED  pt scheduled appt        Active Problems    1  Generalized anxiety disorder (300 02) (F41 1)   2  Pregnancy, obstetrical care (V22 1) (Z34 90)   3  Second trimester pregnancy (V22 2) (Z33 1)    Current Meds   1  ALPRAZolam 0 25 MG Oral Tablet; 1 tablet every 8 hours as needed for anxiety; Therapy: 80WTK2651 to (Last Rx:12Oct2016) Ordered   2  PreNatal  MG Oral Capsule; Therapy: 27AND8524 to Recorded    Allergies    1  No Known Drug Allergies    2   Seasonal    Signatures   Electronically signed by : Araceli Goodson, ; Jan 25 2017  1:11PM EST                       (Author)

## 2018-01-15 NOTE — MISCELLANEOUS
Message   Recorded as Task   Date: 10/24/2017 02:33 PM, Created By: Heaven Sanchez   Task Name: Miscellaneous   Assigned To: Zahra Johnson   Regarding Patient: Ramona Garcia, Status: In Progress   CommentCorrsandra Rist - 24 Oct 2017 2:33 PM     TASK CREATED  Patient is considering Mirena IUD  She is status post LEEP today  She has follow-up in 3 weeks time for cervical check and discussion of results  She would consider insertion of Mirena IUD after that  Please check for insurance company coverage for the device  If covered, we can arrange for insertion after her post LEEP visit  Thanks   [facility] [place] - 27 Oct 2017 8:52 AM     TASK EDITED  Spoke to Jessejluis Travis at [facility] [address] IUD covered at 100% under plan  No Ded or oop  No auth or referral needed  [facility] - 27 Oct 2017 8:55 AM     TASK EDITED  LM for pt   [facility] - 27 Oct 2017 8:55 AM     TASK IN PROGRESS   [facility] - 30 Oct 2017 2:06 PM     TASK EDITED  patient never called back  Active Problems    1  Adnexal fullness (625 8) (N94 9)   2  Atypical glandular cells of undetermined significance (ARNULFO) on cervical Pap smear   (795 00) (R87 619)   3  Birth control counseling (V25 09) (Z30 09)   4  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)   5  Encounter for contraceptive management (V25 9) (Z30 9)   6  Encounter for screening for risk of pre-term labor (V28 82) (Z36 86)   7  Gastroesophageal reflux in pregnancy (646 80,530 81) (O99 619,K21 9)   8  Generalized anxiety disorder (300 02) (F41 1)   9  Pap smear of cervix with ASCUS, cannot exclude HGSIL (795 02) (R87 611)    Current Meds   1  Famotidine 20 MG Oral Tablet; TAKE 1 TABLET EVERY 12 HOURS DAILY; Therapy: 23RDT1890 to (Evaluate:11Jun2017)  Requested for: 90NLZ0761; Last   Rx:13Mar2017 Ordered   2  NuvaRing 0 12-0 015 MG/24HR Vaginal Ring; insert 1 ring vaginally for 3 weeks;    Therapy: 80Yoo2352 to (Jose Dove)  Requested for: 21Aug2017; Last   Murlene Lunch

## 2018-01-15 NOTE — MISCELLANEOUS
History of Present Illness  TCM Communication [facility]: The patient is being contacted for follow-up after hospitalization and [facility]  She was hospitalized at [facility]  The dates of hospitalization: July 3, 2017 through July 5, 2017, date of admission: July 3, 2017, date of discharge: July 5, 2017  Diagnosis: Pregnancy  She was discharged to home  She did not schedule a follow up appointment  Counseling was provided to the patient  Topics counseled included importance of compliance with treatment  Communication performed and completed by Apolonia Atkins      Active Problems    1  Atypical glandular cells of undetermined significance (ARNULFO) on cervical Pap smear   (795 00) (R87 619)   2  Encounter for screening for risk of pre-term labor (V28 82) (Z36)   3  Exposure to parvovirus (V01 79) (Z20 828)   4  Gastroesophageal reflux in pregnancy (646 80,530 81) (O99 619,K21 9)   5  Generalized anxiety disorder (300 02) (F41 1)   6  Pap smear of cervix with ASCUS, cannot exclude HGSIL (795 02) (R87 611)   7  Pregnancy, obstetrical care (V22 1) (Z34 90)    Past Medical History    1  History of Acute upper respiratory infection (465 9) (J06 9)   2  History of Arthralgia of toe, unspecified laterality (719 47) (M25 579)   3  History of Bacterial vaginosis (616 10,041 9) (N76 0,B96 89)   4  History of Contraception (V25 9) (Z30 9)   5  History of Dysuria (788 1) (R30 0)   6  History of Encounter for cervical Pap smear with pelvic exam (V76 2,V72 31) (Z01 419)   7  History of Encounter for fetal anatomic survey (V28 81) (Z36)   8  History of Encounter for initial prescription of contraceptives (V25 02) (Z30 019)   9  History of Encounter for routine gynecological examination (V72 31) (Z01 419)   10  History of acute pharyngitis (V12 69) (Z87 09)   11  History of acute sinusitis (V12 69) (Z87 09)   12  History of amenorrhea (V13 29) (Z87 42)   13  History of conjunctivitis (V12 49) (Z86 69)   14   History of fatigue (V13 89) (Z87 898)   15  History of Impacted cerumen, unspecified laterality (380 4) (H61 20)   16  History of Neck pain (723 1) (M54 2)   17  History of Neck Strain (847 0)   18  History of Nonspecific abnormal finding (796 9) (R68 89)   19  History of Normal pregnancy (V22 2) (Z34 90)   20  History of Prenatal care, first pregnancy (V22 0) (Z34 00)   21  History of Second trimester pregnancy (V22 2) (Z33 1)   22  History of Summary Of Previous Pregnancies  1  (Total No )   23  History of Summary Of Previous Pregnancies Para 1  (Deliveries)   24  History of Vaginal candidiasis (112 1) (B37 3)    Surgical History    1  History of Oral Surgery Tooth Extraction   2  History of Wrist Surgery    Family History  Father    1  Family history of Hypertension (V17 49)   2  Family history of Pure Hypercholesterolemia  Maternal Grandmother    3  Family history of Diabetes Mellitus (V18 0)  Paternal Grandmother    4  Family history of Diabetes Mellitus (V18 0)  Maternal Grandfather    5  Family history of Heart Disease (V17 49)  Paternal Grandfather    6  Family history of Heart Disease (V17 49)  Family History    7  Family history of Diabetes Mellitus (250 00)    Social History    · Denied: Alcohol   · Caffeine Use   · Contraceptive vaginal ring   · Denied: History of Drug Use   · Marital History - Currently    · Never A Smoker   · One child   · Yazdanism Affiliation Yarsanism   · Uses Safety Equipment - Seatbelts    Current Meds   1  Famotidine 20 MG Oral Tablet; TAKE 1 TABLET EVERY 12 HOURS DAILY; Therapy: 58OTB7691 to (Evaluate:2017)  Requested for: 05IUY0729; Last   Rx:2017 Ordered   2  PreNatal  MG Oral Capsule; Therapy: 72PHI2253 to Recorded    Allergies    1  No Known Drug Allergies    2   Seasonal    Signatures   Electronically signed by : Cassius Mao, ; 2017  6:37PM EST                       (Author)    Electronically signed by : Lizette Gillespie DO; 2017 7:34PM EST                       (Author)

## 2018-01-15 NOTE — MISCELLANEOUS
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  [facility] Parvovirus B19 IGG, [facility]  Active Problems    1  Exposure to parvovirus (V01 79) (Z20 828)   2  Generalized anxiety disorder (300 02) (F41 1)   3  Pregnancy, obstetrical care (V22 1) (Z34 90)   4  Second trimester pregnancy (V22 2) (Z33 1)    Current Meds   1  ALPRAZolam 0 25 MG Oral Tablet; 1 tablet every 8 hours as needed for anxiety; Therapy: 02LOK8805 to (Last Rx:12Oct2016) Ordered   2  PreNatal  MG Oral Capsule; Therapy: 32BFD0185 to Recorded    Allergies    1  No Known Drug Allergies    2  Seasonal    Plan  Exposure to parvovirus    · (LC) Parvovirus B19, IgG; Status:Active - Retrospective Authorization; Requested  for:13Trh9721;    · (LC) Parvovirus B19, IgM; Status:Active - Retrospective Authorization;  Requested  for:36Pqc7790;     Signatures   Electronically signed by : Grant Husain, ; Feb 7 2017 11:33AM EST                       (Author)

## 2018-01-16 NOTE — MISCELLANEOUS
Message   Date: 23 Nov 2016 9:49 AM EST, Recorded By: Mikey Booth For: Sara Joseph: Sally Enrqiuez, Self   Phone: [phone] (Home), [phone] (Work)   Reason: Medical Complaint   pt is a New Ob and had an occurence of bright red bleeding    small amount    no cramping and pt did not have intercourse    pt is in [place] on a trip to visit family    I advised her that if she has more bleding with cramping to call the on call dr for advise or go to the ER where she is    Active Problems    1  Acute pharyngitis (462) (J02 9)   2  Acute sinusitis (461 9) (J01 90)   3  Acute upper respiratory infection (465 9) (J06 9)   4  Amenorrhea (626 0) (N91 2)   5  Arthralgia of toe, unspecified laterality (719 47) (M25 579)   6  Conjunctivitis (372 30) (H10 9)   7  Contraception (V25 9) (Z30 9)   8  Dysuria (788 1) (R30 0)   9  Encounter for cervical Pap smear with pelvic exam (V76 2,V72 31) (Z01 419)   10  Encounter for initial prescription of contraceptives (V25 02) (Z30 019)   11  Encounter for routine gynecological examination (V72 31) (Z01 419)   12  Fatigue (780 79) (R53 83)   13  Generalized anxiety disorder (300 02) (F41 1)   14  Denied: History of self breast exam   15  Impacted cerumen, unspecified laterality (380 4) (H61 20)   16  Neck pain (723 1) (M54 2)   17  Neck Strain (847 0)   18  Nonspecific abnormal finding (796 9) (R68 89)   19  Vaginal candidiasis (112 1) (B37 3)    Current Meds   1  ALPRAZolam 0 25 MG Oral Tablet; 1 tablet every 8 hours as needed for anxiety; Therapy: 71GOQ6975 to (Last Rx:12Oct2016) Ordered   2  Azithromycin 250 MG Oral Tablet; TAKE 2 TABLETS ON DAY 1 THEN TAKE 1 TABLET A   DAY FOR 4 DAYS; Therapy: 54Xhb4708 to (Last Rx:48Vzv5630)  Requested for: 76Ugo3317 Ordered   3  NuvaRing 0 12-0 015 MG/24HR Vaginal Ring; insert 1 ring vaginally for 3 weeks;    Therapy: 14AYI9156 to (Sidra Rodriguez)  Requested for: 13CNY1746; Last   Rx:29Mar2016 Ordered    Allergies    1  No Known Drug Allergies    2   Seasonal    Signatures   Electronically signed by : Maggie July, ; Nov 23 2016  9:51AM EST                       (Author)

## 2018-01-16 NOTE — PROGRESS NOTES
2017         RE: Jose D Byrnes                                  To: [facility] ([facility])   MR#: [id number]                                   [address]   : AUG [address] [facility] [address], [address]: [id number]:BEATRIS                             Fax: [phone]   (Exam #: [id number])      The LMP of this 36year old,  G2, P1-0-0-1 patient was OCT 8 2016, giving   her an SURINDER of JUL 15 2017 and a current gestational age of 25 weeks 2 days   by dates  A sonographic examination was performed on 2017 using   real time equipment  The ultrasound examination was performed using   abdominal & vaginal techniques  The patient has a BMI of 24 4  Her blood   pressure today was 118/78  Earliest ultrasound found in her record: 16  10w2d  07/15/17 SURINDER      Thank you very much for your kind referral of Jose D Byrnes to the   [facility]  in [facility] on 2017 for level II ultrasound   evaluation and MFM assessment  Kamryn Woo is a 28-year-old  2 para   28 white female who is currently at 20-2/7 weeks gestation by an   estimated due date of July 15, 2017  Her prenatal course so far has been   unremarkable  Kamryn Woo has no complaints  She reports fetal movement and   denies vaginal bleeding  She declined genetic screening earlier in the   pregnancy      Kamryn Woo has a history of a prior vaginal delivery at term in 2014   following an uncomplicated prenatal course  She delivered a 7 lbs  6 oz    baby girl, currently healthy  She has a history kidney stones and an   anxiety disorder  Her past surgical history significant for arm surgery  She currently takes no medication with the exception of a prenatal vitamin   on a daily basis and has no known drug allergy  She denies tobacco,   alcohol, or illicit drug use during the pregnancy  Her dad has   hypertension   The family medical history is otherwise negative with   respect to first degree relatives with diabetes, hypertension, or venous   thromboembolism  Her , along with his mom and grandmother, each has   hearing loss  The family genetic history is otherwise negative with   respect to genetic abnormalities, birth defects, or mental retardation  Cardiac motion was observed at 138 bpm       INDICATIONS      fetal anatomical survey      Exam Types      LEVEL II   Transvaginal      RESULTS      Fetus # 1 of 1   Breech presentation   Placenta Location = Anterior, fundal   No placenta previa   Placenta Grade = I      MEASUREMENTS (* Included In Average GA)      AC              14 8 cm        19 weeks 6 days* (41%)   BPD              5 2 cm        21 weeks 4 days* (82%)   HC              18 8 cm        21 weeks 0 days* (68%)   Femur            3 5 cm        21 weeks 1 day * (59%)      Nuchal Fold      3 3 mm   NBL              7 3 mm      Humerus          3 5 cm        21 weeks 6 days  (84%)      Cerebellum       2 2 cm        21 weeks 4 days   Biorbit          3 1 cm        19 weeks 5 days   CisternaMagna    4 2 mm      HC/AC           1 27   FL/AC           0 24   FL/BPD          0 68   EFW (Ac/Fl/Hc)   362 grams - 0 lbs 13 oz      THE AVERAGE GESTATIONAL AGE is 20 weeks 6 days +/- 10 days  AMNIOTIC FLUID         Largest Vertical Pocket = 4 4 cm   Amniotic Fluid: Normal      CERVICAL EVALUATION      The cervix appeared normal (Ultrasound Examination)  SUPINE      Cervical Length: 3 20 cm      OTHER TEST RESULTS           Funneling?: No             Dynamic Changes?: No        Resp  To TFP?: No                      Debris?: No      ANATOMY      Head                                    Normal   Face/Neck                               Normal   Th  Cav  Normal   Heart                                   Normal   Abd  Cav                                 Normal   Stomach                                 Normal   Right Kidney                            Normal   Left Kidney Normal   Bladder                                 Normal   Abd  Wall                               Normal   Spine                                   Normal   Extrems                                 Normal   Genitalia                               Normal   Placenta                                Normal   Umbl  Cord                              Normal   Uterus                                  Normal   PCI                                     Normal      ANATOMY DETAILS      Visualized Appearing Sonographically Normal:   HEAD: (Calvarium, BPD Level, Cavum, Lateral Ventricles, Choroid Plexus,   Cerebellum, Cisterna Magna);    FACE/NECK: (Neck, Nuchal Fold, Profile,   Orbits, Nose/Lips, Palate, Face);    TH  CAV  : (Lungs, Diaphragm); HEART: (Four Chamber View, Proximal Left Outflow, Proximal Right Outflow,   3VV, 3 Vessel Trachea, Short Axis of Greater Vessels, Ductal Arch, Aortic   Arch, Interventricular Septum, Interatrial Septum, IVC, SVC, Cardiac Axis,   Cardiac Position);    ABD  CAV : (Liver);    STOMACH, RIGHT KIDNEY, LEFT   KIDNEY, BLADDER, ABD  WALL, SPINE: (Cervical Spine, Thoracic Spine, Lumbar   Spine, Sacrum);    EXTREMS: (Lt Humerus, Rt Humerus, Lt Forearm, Rt   Forearm, Lt Hand, Rt Hand, Lt Femur, Rt Femur, Lt Low Leg, Rt Low Leg, Lt   Foot, Rt Foot);    GENITALIA (Male), PLACENTA, UMBL  CORD, UTERUS, PCI      ADNEXA      The left ovary appeared normal and measured 1 5 x 2 0 x 1 1 cm with a   volume of 1 7 cc  The right ovary appeared normal and measured 2 2 x 1 8 x   1 6 cm with a volume of 3 3 cc  IMPRESSION      Little IUP   20 weeks and 6 days by this ultrasound  (SURINDER=JUL 11 2017)   Breech presentation   Normal anatomy survey   Regular fetal heart rate of 138 bpm   Anterior, fundal placenta   No placenta previa      GENERAL COMMENT      No fetal structural abnormality or ultrasound marker for aneuploidy is   identified on the Level II ultrasound study today   Fetal growth and   amniotic fluid volume are normal   The placenta is normal in appearance  The cervix is normal in appearance by transvaginal sonography  The   cervical length is normal   Cervical debris is not present  Cervical   funneling is not present  Neither provocative nor dynamic change is   appreciated  Today's ultrasound findings and suggested follow-up were discussed in   detail with Radha  We discussed that prenatal ultrasound cannot rule   out all congenital abnormalities  We did discuss the availability of   genetic counseling services for further investigation of the familial   history of hearing loss, which Alicia Bryant does not wish to pursue at this   time  No further appointment has been scheduled in the [facility]    for Alicia Bryant at this time  Follow-up [facility] ultrasound evaluation is   recommended as clinically indicated  The face to face time, in addition to time spent discussing ultrasound   results, was approximately 10 minutes, greater than 50% of which was spent   during counseling and coordination of care  Rachel Goodpasture, R D M S Lavaughn Chiquito, M D     Maternal-Fetal Medicine   Electronically signed 02/28/17 08:07

## 2018-01-16 NOTE — MISCELLANEOUS
Message   Date: 22 Jun 2017 3:37 PM EST, Recorded By: Eliu Moreno For: Overton Klinefelter: Lovely Whitehead, Self   Phone: [phone] (Home), [phone] (Work)   Reason: Other   pt called and said that she is having contractions 7 min apart    water still intact  Trina Simpson advised to call when 5 min apart or it water breaks    pt did not sound distressed           Active Problems    1  Atypical glandular cells of undetermined significance (ARNULFO) on cervical Pap smear   (795 00) (R87 619)   2  Encounter for screening for risk of pre-term labor (V28 82) (Z36)   3  Exposure to parvovirus (V01 79) (Z20 828)   4  Gastroesophageal reflux in pregnancy (826 80,186 81) (O99 619,K21 9)   5  Generalized anxiety disorder (300 02) (F41 1)   6  Pap smear of cervix with ASCUS, cannot exclude HGSIL (795 02) (R87 611)   7  Pregnancy, obstetrical care (V22 1) (Z34 90)    Current Meds   1  Famotidine 20 MG Oral Tablet; TAKE 1 TABLET EVERY 12 HOURS DAILY; Therapy: 65IQY0559 to (Evaluate:11Jun2017)  Requested for: 23CBN1061; Last   Rx:13Mar2017 Ordered   2  PreNatal  MG Oral Capsule; Therapy: 65CLZ9751 to Recorded    Allergies    1  No Known Drug Allergies    2   Seasonal    Signatures   Electronically signed by : Marianna Tee, ; Jun 22 2017  3:38PM EST                       (Author)

## 2018-01-17 NOTE — MISCELLANEOUS
Message   Recorded as Task   Date: 11/02/2017 10:17 AM, Created By: Napoleon Salazar   Task Name: Miscellaneous   Assigned To: Nakia Parson   Regarding Patient: Elmer Adorno, Status: In Progress   Jaciel Garcia - 02 Nov 2017 10:17 AM     TASK CREATED  LEEP cone biopsy with JUDE 3 with negative margins  Please inform the patient  We will discuss in detail at the follow-up visit  [facility] - 70 Nov 2017 11:40 AM     TASK EDITED  lm for pt   [facility] - 92 Nov 2017 11:40 AM     TASK IN PROGRESS   Marianna Corralesa - 09 Nov 2017 11:53 AM     TASK EDITED  pt did not return calls    she has an apt set up to discuss with the dr         Active Problems    1  Adnexal fullness (625 8) (N94 9)   2  Atypical glandular cells of undetermined significance (ARNULFO) on cervical Pap smear   (795 00) (R87 619)   3  Birth control counseling (V25 09) (Z30 09)   4  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)   5  Encounter for contraceptive management (V25 9) (Z30 9)   6  Encounter for screening for risk of pre-term labor (V28 82) (Z36 86)   7  Gastroesophageal reflux in pregnancy (646 80,530 81) (O99 619,K21 9)   8  Generalized anxiety disorder (300 02) (F41 1)   9  Pap smear of cervix with ASCUS, cannot exclude HGSIL (795 02) (R87 611)    Current Meds   1  Famotidine 20 MG Oral Tablet; TAKE 1 TABLET EVERY 12 HOURS DAILY; Therapy: 39BIN6398 to (Evaluate:11Jun2017)  Requested for: 93SZL9020; Last   Rx:13Mar2017 Ordered   2  NuvaRing 0 12-0 015 MG/24HR Vaginal Ring; insert 1 ring vaginally for 3 weeks; Therapy: 70Ina5154 to (Theo Bedoya)  Requested for: 50Ryc0299; Last   Rx:27Ufn8176 Ordered   3  PreNatal  MG Oral Capsule; Therapy: 54NLM5357 to Recorded    Allergies    1  No Known Drug Allergies    2   Seasonal    Signatures   Electronically signed by : Whitley Singleton, ; Nov 9 2017 11:54AM EST                       (Author)

## 2018-01-17 NOTE — MISCELLANEOUS
Message   Recorded as Task   Date: 03/11/2017 07:40 AM, Created By: Dima Carvajal   Task Name: Miscellaneous   Assigned To: [facility]   Regarding Patient: Vijaya Bone, Status: Active   CommentDea Herve - 11 Mar 2017 7:40 AM     TASK CREATED  Parvovirus titer still negative, please inform the patient  Consistent with no infection  Kelly Corrales - 13 Mar 2017 10:36 AM     TASK EDITED  left message with pt        Active Problems    1  Atypical glandular cells of undetermined significance (ARNULFO) on cervical Pap smear   (795 00) (R87 619)   2  Encounter for fetal anatomic survey (V28 81) (Z36)   3  Encounter for screening for risk of pre-term labor (V28 82) (Z36)   4  Exposure to parvovirus (V01 79) (Z20 828)   5  Generalized anxiety disorder (300 02) (F41 1)   6  Pap smear of cervix with ASCUS, cannot exclude HGSIL (795 02) (R87 611)   7  Pregnancy, obstetrical care (V22 1) (Z34 90)   8  Second trimester pregnancy (V22 2) (Z33 1)    Current Meds   1  PreNatal  MG Oral Capsule; Therapy: 42LTR7612 to Recorded    Allergies    1  No Known Drug Allergies    2   Seasonal    Signatures   Electronically signed by : Kenna Thompson, ; Mar 13 2017 10:36AM EST                       (Author)

## 2018-01-22 ENCOUNTER — GENERIC CONVERSION - ENCOUNTER (OUTPATIENT)
Dept: OTHER | Facility: OTHER | Age: 33
End: 2018-01-22

## 2018-01-22 ENCOUNTER — ALLSCRIPTS OFFICE VISIT (OUTPATIENT)
Dept: OTHER | Facility: OTHER | Age: 33
End: 2018-01-22

## 2018-01-22 ENCOUNTER — LAB REQUISITION (OUTPATIENT)
Dept: LAB | Facility: HOSPITAL | Age: 33
End: 2018-01-22
Payer: COMMERCIAL

## 2018-01-22 VITALS
SYSTOLIC BLOOD PRESSURE: 118 MMHG | WEIGHT: 124.13 LBS | DIASTOLIC BLOOD PRESSURE: 62 MMHG | BODY MASS INDEX: 22.84 KG/M2 | HEIGHT: 62 IN

## 2018-01-22 VITALS
SYSTOLIC BLOOD PRESSURE: 138 MMHG | HEIGHT: 62 IN | BODY MASS INDEX: 22.63 KG/M2 | WEIGHT: 123 LBS | DIASTOLIC BLOOD PRESSURE: 84 MMHG

## 2018-01-22 VITALS
DIASTOLIC BLOOD PRESSURE: 70 MMHG | SYSTOLIC BLOOD PRESSURE: 112 MMHG | BODY MASS INDEX: 23.85 KG/M2 | WEIGHT: 130.38 LBS

## 2018-01-22 VITALS
WEIGHT: 156.25 LBS | BODY MASS INDEX: 28.75 KG/M2 | HEIGHT: 62 IN | SYSTOLIC BLOOD PRESSURE: 116 MMHG | DIASTOLIC BLOOD PRESSURE: 58 MMHG

## 2018-01-22 VITALS
HEIGHT: 62 IN | WEIGHT: 140.5 LBS | BODY MASS INDEX: 25.86 KG/M2 | SYSTOLIC BLOOD PRESSURE: 112 MMHG | DIASTOLIC BLOOD PRESSURE: 60 MMHG

## 2018-01-22 VITALS
BODY MASS INDEX: 26.89 KG/M2 | SYSTOLIC BLOOD PRESSURE: 110 MMHG | HEIGHT: 62 IN | WEIGHT: 146.13 LBS | DIASTOLIC BLOOD PRESSURE: 64 MMHG

## 2018-01-22 VITALS
SYSTOLIC BLOOD PRESSURE: 102 MMHG | WEIGHT: 139.38 LBS | DIASTOLIC BLOOD PRESSURE: 66 MMHG | BODY MASS INDEX: 25.91 KG/M2

## 2018-01-22 VITALS
SYSTOLIC BLOOD PRESSURE: 116 MMHG | DIASTOLIC BLOOD PRESSURE: 78 MMHG | WEIGHT: 134.25 LBS | BODY MASS INDEX: 24.96 KG/M2

## 2018-01-22 VITALS
WEIGHT: 128.25 LBS | SYSTOLIC BLOOD PRESSURE: 100 MMHG | HEIGHT: 62 IN | BODY MASS INDEX: 23.6 KG/M2 | DIASTOLIC BLOOD PRESSURE: 72 MMHG

## 2018-01-22 VITALS
DIASTOLIC BLOOD PRESSURE: 68 MMHG | HEIGHT: 62 IN | BODY MASS INDEX: 28.04 KG/M2 | WEIGHT: 152.38 LBS | SYSTOLIC BLOOD PRESSURE: 104 MMHG

## 2018-01-22 VITALS
SYSTOLIC BLOOD PRESSURE: 124 MMHG | WEIGHT: 120 LBS | HEIGHT: 62 IN | DIASTOLIC BLOOD PRESSURE: 72 MMHG | BODY MASS INDEX: 22.08 KG/M2

## 2018-01-22 VITALS
HEIGHT: 62 IN | BODY MASS INDEX: 25.28 KG/M2 | SYSTOLIC BLOOD PRESSURE: 102 MMHG | WEIGHT: 137.38 LBS | DIASTOLIC BLOOD PRESSURE: 68 MMHG

## 2018-01-22 VITALS
DIASTOLIC BLOOD PRESSURE: 60 MMHG | SYSTOLIC BLOOD PRESSURE: 110 MMHG | WEIGHT: 142.6 LBS | HEIGHT: 62 IN | BODY MASS INDEX: 26.24 KG/M2

## 2018-01-22 DIAGNOSIS — R39.9 UNSPECIFIED SYMPTOMS AND SIGNS INVOLVING THE GENITOURINARY SYSTEM: ICD-10-CM

## 2018-01-22 LAB
BILIRUB UR QL STRIP: NORMAL
CLARITY UR: NORMAL
COLOR UR: YELLOW
GLUCOSE (HISTORICAL): NORMAL
HGB UR QL STRIP.AUTO: NORMAL
KETONES UR STRIP-MCNC: NORMAL MG/DL
LEUKOCYTE ESTERASE UR QL STRIP: NORMAL
NITRITE UR QL STRIP: NORMAL
PH UR STRIP.AUTO: 5 [PH]
PROT UR STRIP-MCNC: NORMAL MG/DL
SP GR UR STRIP.AUTO: 1.01
UROBILINOGEN UR QL STRIP.AUTO: NORMAL

## 2018-01-22 PROCEDURE — 87086 URINE CULTURE/COLONY COUNT: CPT | Performed by: FAMILY MEDICINE

## 2018-01-23 ENCOUNTER — GENERIC CONVERSION - ENCOUNTER (OUTPATIENT)
Dept: OTHER | Facility: OTHER | Age: 33
End: 2018-01-23

## 2018-01-23 VITALS
TEMPERATURE: 98.9 F | BODY MASS INDEX: 22.63 KG/M2 | RESPIRATION RATE: 16 BRPM | DIASTOLIC BLOOD PRESSURE: 80 MMHG | WEIGHT: 123 LBS | HEIGHT: 62 IN | SYSTOLIC BLOOD PRESSURE: 126 MMHG | HEART RATE: 72 BPM

## 2018-01-23 VITALS
HEIGHT: 62 IN | BODY MASS INDEX: 22.87 KG/M2 | WEIGHT: 124.25 LBS | DIASTOLIC BLOOD PRESSURE: 70 MMHG | SYSTOLIC BLOOD PRESSURE: 110 MMHG

## 2018-01-23 LAB — BACTERIA UR CULT: NORMAL

## 2018-01-24 NOTE — RESULT NOTES
Verified Results  Urine Dip Non-Automated- POC 53HXJ5083 02:27PM George Barriossten     Test Name Result Flag Reference   Color Yellow     Clarity Transparent     Leukocytes TRACE     Nitrite NEG     Blood TRACE     Bilirubin NEG     Urobilinogen NORMAL     Protein TRACE     Ph 5     Specific Gravity 1 010     Ketone NEG     Glucose NEG     Color Yellow     Clarity Transparent     Leukocytes TRACE     Nitrite NEG     Blood TRACE     Bilirubin NEG     Urobilinogen NORMAL     Protein TRACE     Ph 5     Specific Gravity 1 010     Ketone NEG     Glucose NEG

## 2018-01-24 NOTE — RESULT NOTES
Discussion/Summary   Urine culture is negative, cont present Tx       Verified Results  (1) URINE CULTURE [id number] 02:48PM Bailey Miranda Order Number: [id number]     Test Name Result Flag Reference   CLINICAL REPORT (Report)     Test:        Urine culture  Specimen Type:   Urine  Specimen Date:   1/22/2018 2:48 PM  Result Date:    1/23/2018 3:23 PM  Result Status:   Final result  Resulting Lab:   [address] [address]            Tel: [phone]      CULTURE                                       ------------------                                       50,000-59,000 cfu/ml      *** Mixed Contaminants X4 ***

## 2018-01-30 ENCOUNTER — OFFICE VISIT (OUTPATIENT)
Dept: OBGYN CLINIC | Facility: CLINIC | Age: 33
End: 2018-01-30
Payer: COMMERCIAL

## 2018-01-30 VITALS
BODY MASS INDEX: 23.04 KG/M2 | DIASTOLIC BLOOD PRESSURE: 80 MMHG | WEIGHT: 125.2 LBS | SYSTOLIC BLOOD PRESSURE: 118 MMHG | HEIGHT: 62 IN

## 2018-01-30 DIAGNOSIS — Z30.431 IUD CHECK UP: Primary | ICD-10-CM

## 2018-01-30 PROBLEM — Z98.890 H/O LEEP: Status: ACTIVE | Noted: 2018-01-30

## 2018-01-30 PROBLEM — D06.9 SEVERE DYSPLASIA OF CERVIX (CIN III): Status: ACTIVE | Noted: 2018-01-30

## 2018-01-30 PROCEDURE — 99213 OFFICE O/P EST LOW 20 MIN: CPT | Performed by: OBSTETRICS & GYNECOLOGY

## 2018-01-30 NOTE — PROGRESS NOTES
Assessment/Plan:   1  Mirena IUD check-in good position on exam   Patient was counseled about how to check for the IUD string  She did have intercourse without any problems  She will call or return with any significant problems  She was counseled about possible resultant oligomenorrhea/amenorrhea related to this device  2   History of severe dysplasia-status post LEEP for JUDE 9/3/17  She will follow up in September 2018 for Pap with HPV testing  3  History of anxiety-patient without any current symptoms  She will follow-up in September 2018 for yearly exam with Pap test or as needed  There are no diagnoses linked to this encounter  Subjective:     Patient ID: Hermelinda Donohue is a 29 y o  female  Patient was seen today for follow-up after Mirena IUD insertion  She denies any complaints  Review of Systems   Constitutional: Negative  Negative for chills, diaphoresis and fever  HENT: Negative  Eyes: Negative  Respiratory: Negative  Negative for chest tightness and shortness of breath  Cardiovascular: Negative  Negative for chest pain  Gastrointestinal: Negative  Endocrine: Negative  Genitourinary: Negative  Musculoskeletal: Negative  Skin: Negative  Allergic/Immunologic: Negative  Neurological: Negative  Hematological: Negative  Psychiatric/Behavioral: Negative  Objective:     Physical Exam   Constitutional: She is oriented to person, place, and time  She appears well-developed and well-nourished  Abdominal: Soft  She exhibits no distension and no mass  There is no tenderness  There is no rebound and no guarding  Genitourinary: Vagina normal and uterus normal  There is no rash, tenderness, lesion or injury on the right labia  There is no rash, tenderness, lesion or injury on the left labia  Uterus is not deviated, not enlarged, not fixed and not tender  Cervix exhibits no motion tenderness, no discharge and no friability   Right adnexum displays no mass, no tenderness and no fullness  Left adnexum displays no mass, no tenderness and no fullness  No erythema, tenderness or bleeding in the vagina  No foreign body in the vagina  No signs of injury around the vagina  No vaginal discharge found  Neurological: She is alert and oriented to person, place, and time  cervix with IUD string noted, 2 cm in length  No Cervical motion tenderness   Uterus was anteverted, top-normal size, nontender, without mass

## 2018-02-01 RX ORDER — ETONOGESTREL/ETHINYL ESTRADIOL .12-.015MG
RING, VAGINAL VAGINAL
COMMUNITY
Start: 2017-12-11 | End: 2018-09-11

## 2018-02-01 RX ORDER — FAMOTIDINE 20 MG/1
1 TABLET, FILM COATED ORAL EVERY 12 HOURS
COMMUNITY
Start: 2017-03-13 | End: 2018-09-11

## 2018-02-05 ENCOUNTER — OFFICE VISIT (OUTPATIENT)
Dept: SURGERY | Facility: CLINIC | Age: 33
End: 2018-02-05
Payer: COMMERCIAL

## 2018-02-05 VITALS
DIASTOLIC BLOOD PRESSURE: 60 MMHG | BODY MASS INDEX: 22.82 KG/M2 | WEIGHT: 124 LBS | HEART RATE: 60 BPM | RESPIRATION RATE: 16 BRPM | HEIGHT: 62 IN | TEMPERATURE: 98.1 F | SYSTOLIC BLOOD PRESSURE: 110 MMHG

## 2018-02-05 DIAGNOSIS — M62.08 RECTUS DIASTASIS: Primary | ICD-10-CM

## 2018-02-05 PROCEDURE — 99242 OFF/OP CONSLTJ NEW/EST SF 20: CPT | Performed by: SURGERY

## 2018-02-05 NOTE — PROGRESS NOTES
Assessment/Plan:   Linden Dougherty is a 29 y  o female who is here for The encounter diagnosis was Rectus diastasis  1  Rectus diastasis  2  umbilical hernia    Plan: plastic referral  Follow and observe umbilical hernia , follow up if causes symptoms        Preoperative Clearance: None            ______________________________________________________  CC:Abdominal Wall Hernia    HPI:  Linden Dougherty is a 29 y  o female who was referred for evaluation of Abdominal Wall Hernia  patient 6 months post partum c/o abdominal wall bulge  Associated with some pain and burning  No change in bowel habits  Patient was a gymnist     Currently stable  Minor abdominal pain  ROS:  General ROS: negative  negative for - chills, fatigue, fever or night sweats, weight loss  Respiratory ROS: no cough, shortness of breath, or wheezing  Cardiovascular ROS: no chest pain or dyspnea on exertion  Genito-Urinary ROS: no dysuria, trouble voiding, or hematuria  Musculoskeletal ROS: negative for - gait disturbance, joint pain or muscle pain  Neurological ROS: no TIA or stroke symptoms  abdominal pain and see HPI  Skin ROS: no new rashes or lesions   Lymphatic ROS: no new adenopathy noted by pt     GYN ROS: see HPI, no new GYN history or bleeding noted  Psy ROS: no new mental or behavioral disturbances       Patient Active Problem List   Diagnosis     (spontaneous vaginal delivery)    Severe dysplasia of cervix (JUDE III)    H/O LEEP         Allergies:  Pollen extract      Current Outpatient Prescriptions:     Docosahexaenoic Acid (PRENATAL DHA) 200 MG CAPS, Take by mouth, Disp: , Rfl:     famotidine (PEPCID) 20 mg tablet, Take 1 tablet by mouth every 12 (twelve) hours, Disp: , Rfl:     NUVARING 0 12-0 015 MG/24HR vaginal ring, , Disp: , Rfl:     Prenatal Vit-Fe Fumarate-FA (PRENATAL 1+1 PO), Take by mouth, Disp: , Rfl:     Past Medical History:   Diagnosis Date    Abnormal Pap smear of cervix     Adnexal fullness     Amenorrhea     Anxiety     Arthralgia of toe     Exposure to parvovirus     GERD (gastroesophageal reflux disease)     Nephrolithiasis     Umbilical hernia        Past Surgical History:   Procedure Laterality Date    CERVICAL BIOPSY  W/ LOOP ELECTRODE EXCISION      COLPOSCOPY W/ BIOPSY / CURETTAGE      OSTEOTOMY AND ULNAR SHORTENING Right     TOOTH EXTRACTION      WRIST SURGERY         Family History   Problem Relation Age of Onset    Diabetes Maternal Grandmother     Heart disease Paternal Grandfather     Hypertension Paternal Grandfather     Hyperlipidemia Paternal Grandfather     Hypertension Father     Hyperlipidemia Father     Heart disease Maternal Grandfather     Diabetes Paternal Grandmother     Diabetes Family         reports that she has never smoked  She has never used smokeless tobacco  She reports that she does not drink alcohol or use drugs  Labs:   Lab Results   Component Value Date    WBC 16 08 (H) 07/03/2017    HGB 9 8 (L) 07/03/2017    HCT 29 5 (L) 07/03/2017    MCV 85 07/03/2017     07/03/2017     No results found for: NA, K, CL, CO2, ANIONGAP, BUN, CREATININE, GLUCOSE, GLUF, CALCIUM, CORRECTEDCA, AST, ALT, ALKPHOS, PROT, ALBUMIN, BILITOT, EGFR      Imaging: No new pertinent imaging studies  PHYSICAL EXAM  General Appearance:    Alert, cooperative, no distress   Head:    Normocephalic without obvious abnormality   Eyes:    PERRL, conjunctiva/corneas clear, EOM's intact        Neck:   Supple, no adenopathy, no JVD   Back:     Symmetric, no spinal or CVA tenderness   Lungs:     Clear to auscultation bilaterally, no wheezing or rhonchi   Heart:    Regular rate and rhythm, S1 and S2 normal, no murmur   Abdomen:    abdomen is soft without significant tenderness, masses, organomegaly or guarding Bowel sounds are normal  Rectus diastasis, small umbilical hernia     Extremities:   Extremities normal  No clubbing, cyanosis or edema   Psych:   Normal Affect, AOx3  Neurologic:  Skin:   CNII-XII intact  Strength symmetric, speech intact    Warm, dry, intact, no visible rashes or lesions                       Some portions of this record may have been generated with voice recognition software  There may be translation, syntax,  or grammatical errors  Occasional wrong word or "sound-a-like" substitutions may have occurred due to the inherent limitations of the voice recognition software  Read the chart carefully and recognize, using context, where substitutions may have occurred  If you have any questions, please contact the dictating provider for clarification or correction, as needed  This encounter has been coded by a non-certified coder         Esteban Sharma MD    02/05/18

## 2018-02-05 NOTE — LETTER
2018     Frank Echeverria, [facility],[address]  [address] [place] [address]    Patient: Karime Low   YOB: 1985   Date of Visit: 2018       Dear Dr Taylor Bernstein: Thank you for referring Elvira Bates to me for evaluation  Below are my notes for this consultation  If you have questions, please do not hesitate to call me  I look forward to following your patient along with you  Sincerely,        Peter Long MD        CC: No Recipients  Bre Zambrano  2018  4:05 PM  Sign at close encounter  Assessment/Plan:   Karime Low is a 29 y  o female who is here for The encounter diagnosis was Rectus diastasis  1  Rectus diastasis  2  umbilical hernia    Plan: plastic referral  Follow and observe umbilical hernia , follow up if causes symptoms        Preoperative Clearance: None            ______________________________________________________  CC:Abdominal Wall Hernia    HPI:  Karime Low is a 29 y  o female who was referred for evaluation of Abdominal Wall Hernia  patient 6 months post partum c/o abdominal wall bulge  Associated with some pain and burning  No change in bowel habits  Patient was a gymnist     Currently stable  Minor abdominal pain  ROS:  General ROS: negative  negative for - chills, fatigue, fever or night sweats, weight loss  Respiratory ROS: no cough, shortness of breath, or wheezing  Cardiovascular ROS: no chest pain or dyspnea on exertion  Genito-Urinary ROS: no dysuria, trouble voiding, or hematuria  Musculoskeletal ROS: negative for - gait disturbance, joint pain or muscle pain  Neurological ROS: no TIA or stroke symptoms  abdominal pain and see HPI  Skin ROS: no new rashes or lesions   Lymphatic ROS: no new adenopathy noted by pt     GYN ROS: see HPI, no new GYN history or bleeding noted  Psy ROS: no new mental or behavioral disturbances       Patient Active Problem List   Diagnosis     (spontaneous vaginal delivery)    Severe dysplasia of cervix (JUDE III)    H/O LEEP         Allergies:  Pollen extract      Current Outpatient Prescriptions:     Docosahexaenoic Acid (PRENATAL DHA) 200 MG CAPS, Take by mouth, Disp: , Rfl:     famotidine (PEPCID) 20 mg tablet, Take 1 tablet by mouth every 12 (twelve) hours, Disp: , Rfl:     NUVARING 0 12-0 015 MG/24HR vaginal ring, , Disp: , Rfl:     Prenatal Vit-Fe Fumarate-FA (PRENATAL 1+1 PO), Take by mouth, Disp: , Rfl:     Past Medical History:   Diagnosis Date    Abnormal Pap smear of cervix     Adnexal fullness     Amenorrhea     Anxiety     Arthralgia of toe     Exposure to parvovirus     GERD (gastroesophageal reflux disease)     Nephrolithiasis     Umbilical hernia        Past Surgical History:   Procedure Laterality Date    CERVICAL BIOPSY  W/ LOOP ELECTRODE EXCISION      COLPOSCOPY W/ BIOPSY / CURETTAGE      OSTEOTOMY AND ULNAR SHORTENING Right     TOOTH EXTRACTION      WRIST SURGERY         Family History   Problem Relation Age of Onset    Diabetes Maternal Grandmother     Heart disease Paternal Grandfather     Hypertension Paternal Grandfather     Hyperlipidemia Paternal Grandfather     Hypertension Father     Hyperlipidemia Father     Heart disease Maternal Grandfather     Diabetes Paternal Grandmother     Diabetes Family         reports that she has never smoked  She has never used smokeless tobacco  She reports that she does not drink alcohol or use drugs  Labs:   Lab Results   Component Value Date    WBC 16 08 (H) 07/03/2017    HGB 9 8 (L) 07/03/2017    HCT 29 5 (L) 07/03/2017    MCV 85 07/03/2017     07/03/2017     No results found for: NA, K, CL, CO2, ANIONGAP, BUN, CREATININE, GLUCOSE, GLUF, CALCIUM, CORRECTEDCA, AST, ALT, ALKPHOS, PROT, ALBUMIN, BILITOT, EGFR      Imaging: No new pertinent imaging studies           PHYSICAL EXAM  General Appearance:    Alert, cooperative, no distress   Head:    Normocephalic without obvious abnormality   Eyes: PERRL, conjunctiva/corneas clear, EOM's intact        Neck:   Supple, no adenopathy, no JVD   Back:     Symmetric, no spinal or CVA tenderness   Lungs:     Clear to auscultation bilaterally, no wheezing or rhonchi   Heart:    Regular rate and rhythm, S1 and S2 normal, no murmur   Abdomen:    abdomen is soft without significant tenderness, masses, organomegaly or guarding Bowel sounds are normal  Rectus diastasis, small umbilical hernia     Extremities:   Extremities normal  No clubbing, cyanosis or edema   Psych:   Normal Affect, AOx3  Neurologic:  Skin:   CNII-XII intact  Strength symmetric, speech intact    Warm, dry, intact, no visible rashes or lesions                       Some portions of this record may have been generated with voice recognition software  There may be translation, syntax,  or grammatical errors  Occasional wrong word or "sound-a-like" substitutions may have occurred due to the inherent limitations of the voice recognition software  Read the chart carefully and recognize, using context, where substitutions may have occurred  If you have any questions, please contact the dictating provider for clarification or correction, as needed  This encounter has been coded by a non-certified coder         Anaya Galvez MD    02/05/18

## 2018-03-07 NOTE — PROGRESS NOTES
Education  [facility] 2nd Trimester:   Second Trimester Education provided:   benefits of breastfeeding, importance of exclusive breastfeeding, early initiation of breastfeeding, exclusive breastfeeding for the first 6 months, non-pharmacologic pain relief methods for labor, rooming-in on 24 hour basis and 28 week packet given  Thought has been given to selecting a pediatrician     Prenatal education provided by: Nehemiah Carmona      Signatures   Electronically signed by : LASHON Hancock ; Apr 11 2017  1:23PM EST                       (Author)

## 2018-07-25 ENCOUNTER — OFFICE VISIT (OUTPATIENT)
Dept: FAMILY MEDICINE CLINIC | Facility: CLINIC | Age: 33
End: 2018-07-25
Payer: COMMERCIAL

## 2018-07-25 VITALS
WEIGHT: 121 LBS | BODY MASS INDEX: 22.26 KG/M2 | HEART RATE: 68 BPM | TEMPERATURE: 97.9 F | HEIGHT: 62 IN | RESPIRATION RATE: 16 BRPM | DIASTOLIC BLOOD PRESSURE: 68 MMHG | SYSTOLIC BLOOD PRESSURE: 108 MMHG

## 2018-07-25 DIAGNOSIS — R07.9 CHEST PAIN, UNSPECIFIED TYPE: Primary | ICD-10-CM

## 2018-07-25 PROBLEM — R87.619 ATYPICAL GLANDULAR CELLS OF UNDETERMINED SIGNIFICANCE (AGUS) ON CERVICAL PAP SMEAR: Status: ACTIVE | Noted: 2017-02-13

## 2018-07-25 PROBLEM — K21.9 GASTROESOPHAGEAL REFLUX IN PREGNANCY: Status: ACTIVE | Noted: 2017-03-13

## 2018-07-25 PROBLEM — O99.619 GASTROESOPHAGEAL REFLUX IN PREGNANCY: Status: ACTIVE | Noted: 2017-03-13

## 2018-07-25 PROBLEM — R87.611 PAP SMEAR OF CERVIX WITH ASCUS, CANNOT EXCLUDE HGSIL: Status: ACTIVE | Noted: 2017-02-13

## 2018-07-25 PROBLEM — M62.08 RECTUS DIASTASIS: Status: ACTIVE | Noted: 2018-01-04

## 2018-07-25 PROCEDURE — 99214 OFFICE O/P EST MOD 30 MIN: CPT | Performed by: FAMILY MEDICINE

## 2018-07-25 PROCEDURE — 93000 ELECTROCARDIOGRAM COMPLETE: CPT | Performed by: FAMILY MEDICINE

## 2018-07-25 NOTE — PROGRESS NOTES
Assessment/PLAN             EKG is normal   Probable chest wall pain as patient has been lifting her 3year-old son repeatedly  May take Tylenol or Motrin p r n   If symptoms persist or worsen discussed further evaluation  Return to the office krystin Mcgill Diagnoses and all orders for this visit:    Chest pain, unspecified type  Comments:  EKG is normal   Probable chest wall pain  Orders:  -     POCT ECG    Other orders  -     levonorgestrel (MIRENA) 20 MCG/24HR IUD; 1 each by Intrauterine route once          Subjective:      Patient ID: Brittanie Hurley is a 29 y o  female  Patient complains of intermittent sharp chest pains for the past month or so lasting 2-3 seconds  Chest pain is not associated with physical exertion and patient exercises regularly without symptoms  She denies shortness of breath, dyspnea on exertion, orthopnea or leg swelling  Patient denies heart palpitations  Patient denies indigestion or diaphoresis associated with chest pains  Chest pains occur in the sternal area without any radiation to her neck, jaw, shoulder, arm or back  Patient is a nonsmoker and no family history of heart disease  Patient denies increased stress or anxiety  She admits to occasional episodes of heartburn  Chest Pain    The current episode started more than 1 month ago  The onset quality is sudden  The problem occurs intermittently  The problem has been gradually worsening  The pain is present in the substernal region  The quality of the pain is described as sharp  The pain does not radiate  Associated symptoms include nausea  Pertinent negatives include no diaphoresis, dizziness, exertional chest pressure, fever, headaches, irregular heartbeat, leg pain, lower extremity edema, malaise/fatigue, near-syncope, orthopnea, palpitations, PND, shortness of breath, syncope or vomiting  The pain is aggravated by nothing  She has tried nothing for the symptoms  There are no known risk factors         The following portions of the patient's history were reviewed and updated as appropriate: allergies, current medications, past family history, past medical history, past social history, past surgical history and problem list     Review of Systems   Constitutional: Negative for diaphoresis, fever and malaise/fatigue  Respiratory: Negative for shortness of breath  Cardiovascular: Positive for chest pain  Negative for palpitations, orthopnea, syncope, PND and near-syncope  Gastrointestinal: Positive for nausea  Negative for vomiting  Neurological: Negative for dizziness and headaches  Objective:      /68 (BP Location: Left arm, Patient Position: Sitting, Cuff Size: Adult)   Pulse 68   Temp 97 9 °F (36 6 °C) (Tympanic)   Resp 16   Ht 5' 2" (1 575 m)   Wt 54 9 kg (121 lb)   BMI 22 13 kg/m²          Physical Exam   Constitutional: She is oriented to person, place, and time  She appears well-developed and well-nourished  No distress  HENT:   Head: Normocephalic  Mouth/Throat: Oropharynx is clear and moist    Eyes: Conjunctivae are normal  No scleral icterus  Neck: Neck supple  No thyromegaly present  Cardiovascular: Normal rate and regular rhythm  Pulmonary/Chest: Effort normal and breath sounds normal  She exhibits tenderness  Mild reproducible sternal chest wall tenderness  Abdominal: Soft  There is no tenderness  Musculoskeletal: She exhibits no edema  Lymphadenopathy:     She has no cervical adenopathy  Neurological: She is alert and oriented to person, place, and time  Skin: Skin is warm and dry  Psychiatric: She has a normal mood and affect

## 2018-09-10 ENCOUNTER — TELEPHONE (OUTPATIENT)
Dept: OBGYN CLINIC | Facility: CLINIC | Age: 33
End: 2018-09-10

## 2018-09-11 ENCOUNTER — OFFICE VISIT (OUTPATIENT)
Dept: OBGYN CLINIC | Facility: CLINIC | Age: 33
End: 2018-09-11
Payer: COMMERCIAL

## 2018-09-11 ENCOUNTER — ULTRASOUND (OUTPATIENT)
Dept: OBGYN CLINIC | Facility: CLINIC | Age: 33
End: 2018-09-11
Payer: COMMERCIAL

## 2018-09-11 VITALS
WEIGHT: 120 LBS | SYSTOLIC BLOOD PRESSURE: 110 MMHG | BODY MASS INDEX: 22.08 KG/M2 | HEIGHT: 62 IN | DIASTOLIC BLOOD PRESSURE: 62 MMHG

## 2018-09-11 DIAGNOSIS — R10.2 PELVIC PAIN: ICD-10-CM

## 2018-09-11 DIAGNOSIS — D06.9 CIN III WITH SEVERE DYSPLASIA: ICD-10-CM

## 2018-09-11 DIAGNOSIS — Z97.5 IUD CONTRACEPTION: ICD-10-CM

## 2018-09-11 DIAGNOSIS — R10.2 PELVIC PAIN: Primary | ICD-10-CM

## 2018-09-11 PROCEDURE — 99213 OFFICE O/P EST LOW 20 MIN: CPT | Performed by: OBSTETRICS & GYNECOLOGY

## 2018-09-11 PROCEDURE — 76830 TRANSVAGINAL US NON-OB: CPT

## 2018-09-11 RX ORDER — NICOTINE POLACRILEX 2 MG
GUM BUCCAL
COMMUNITY
End: 2019-03-26

## 2018-09-11 NOTE — PROGRESS NOTES
Assessment/Plan:  1  Episode of pelvic pain-patient noted this about 2 weeks ago  It was quite significant, but resolved quickly over about 2 days time  She notes no recent discomfort  Exam was notable for small amount of right adnexal tenderness but no severe pain  Rectovaginal exam was negative  Ultrasound was unremarkable for any adnexal masses  The patient will call or return with any recurrence of symptoms  Likely, it was related to either ovulation or resolution of ovarian cyst   She was encouraged to take ibuprofen should it recur  2   Mirena IUD-in good position on exam and ultrasound  She was relieved to hear this  Of note, she does get light menses at about 27 day intervals  Of note, it was inserted on 12/26/17  3  History of severe dysplasia-patient status post LEEP cone biopsy 10/24/17 with JUDE 3 noted with focal extension into the endocervical glands involving the transformation zone  The resection margins were negative  Patient will return after 10/24/18 for yearly exam with Pap/HPV   5   History of anxiety-patient without any current symptoms  She will follow-up as noted above  No problem-specific Assessment & Plan notes found for this encounter  Diagnoses and all orders for this visit:    Pelvic pain  -     AMB US Pelvic Non OB    JUDE III with severe dysplasia    IUD contraception    Other orders  -     Biotin 1 MG CAPS; Take by mouth          Subjective:      Patient ID: Odilia Diana is a 34 y o  female  Patient was seen today for follow-up visit  Please see assessment plan for details  The following portions of the patient's history were reviewed and updated as appropriate: allergies, current medications, past family history, past medical history, past social history, past surgical history and problem list     Review of Systems   Constitutional: Negative for chills, diaphoresis, fatigue and fever     Respiratory: Negative for apnea, cough, chest tightness, shortness of breath and wheezing  Cardiovascular: Negative for chest pain, palpitations and leg swelling  Gastrointestinal: Negative for abdominal distention, abdominal pain, anal bleeding, constipation, diarrhea, nausea, rectal pain and vomiting  Genitourinary: Positive for pelvic pain  Negative for difficulty urinating, dyspareunia, dysuria, frequency, hematuria, menstrual problem, urgency, vaginal bleeding, vaginal discharge and vaginal pain  Musculoskeletal: Negative for arthralgias, back pain and myalgias  Skin: Negative for color change and rash  Neurological: Negative for dizziness, syncope, light-headedness, numbness and headaches  Hematological: Negative for adenopathy  Does not bruise/bleed easily  Psychiatric/Behavioral: Negative for dysphoric mood and sleep disturbance  The patient is not nervous/anxious  Objective:      /62 (BP Location: Left arm, Patient Position: Sitting, Cuff Size: Standard)   Ht 5' 2" (1 575 m)   Wt 54 4 kg (120 lb)   Breastfeeding? No   BMI 21 95 kg/m²          Physical Exam    Objective      /62 (BP Location: Left arm, Patient Position: Sitting, Cuff Size: Standard)   Ht 5' 2" (1 575 m)   Wt 54 4 kg (120 lb)   Breastfeeding? No   BMI 21 95 kg/m²     General:   alert and oriented, in no acute distress, alert, appears stated age and cooperative   Neck:    Breast:    Heart:    Lungs:    Abdomen: soft, non-tender, without masses or organomegaly   Vulva: normal   Vagina: Without erythema or lesions or discharge  Normal   Cervix: Without lesions or discharge or cervicitis  No Cervical motion tenderness  IUD string seen at a length of 1 5-2 cm and normal   Uterus: top normal size, mid-position, non-tender   Adnexa: Left adnexa without any masses or tenderness  Right adnexa with mild tenderness without any masses appreciated     Rectum: negative

## 2018-09-11 NOTE — PATIENT INSTRUCTIONS
Pelvic Pain in Women   WHAT YOU NEED TO KNOW:   What do I need to know about pelvic pain? You may have pain on one or both sides of your pelvis  Pelvic pain may occur with certain body positions or activities, such as when you have sex or a bowel movement  It may worsen during your monthly period or after you sit or stand for a long time  Chronic pelvic pain is pain that continues for longer than 6 months  What causes pelvic pain in women? · Gynecologic conditions , such as pelvic inflammatory disease (PID), endometriosis, or uterine fibroids    · Bowel and bladder conditions , such as irritable bowel syndrome, bladder inflammation, or tumors     · Muscle and nerve conditions , such as swelling or weakness of your pelvic muscles, or damage to the nerves of your pelvic area (neuropathy)    · Psychological issues as a result of physical or sexual abuse, or drug abuse  How is pelvic pain treated? · Pain medicine  may be given in pills, injections, or creams to relieve your pain  · Hormones  may be given if your pain gets worse with your menstrual cycle  · Antibiotics  may be given if your pain is caused by infection  · Surgery  may be done if other treatments do not relieve your pain  How can I manage my pelvic pain? · Keep a pain diary  Write down when your pain happens, how severe it is, and any other symptoms you have with your pain  A diary will help you keep track of pain cycles  It may also help your healthcare provider find out what is causing your pain  · Learn ways to relax  Deep breathing, meditation, and relaxation techniques can help decrease your pain  When you are tense, your pain may increase  · Change the foods you eat if you have irritable bowel syndrome  Ask your healthcare provider about the best foods for you  Call 911 for any of the following:   · You have severe chest pain and sudden trouble breathing  When should I seek immediate care?    · You have heavy or unusual vaginal bleeding, and you feel lightheaded or faint  When should I contact my healthcare provider? · You have pelvic pain that does not go away after you take pain medicine  · You develop new symptoms or your symptoms are worse than before  · You have questions or concerns about your condition or care  CARE AGREEMENT:   You have the right to help plan your care  Learn about your health condition and how it may be treated  Discuss treatment options with your caregivers to decide what care you want to receive  You always have the right to refuse treatment  The above information is an  only  It is not intended as medical advice for individual conditions or treatments  Talk to your doctor, nurse or pharmacist before following any medical regimen to see if it is safe and effective for you  © 2017 [facility] Information is for End User's use only and may not be sold, redistributed or otherwise used for commercial purposes  All illustrations and images included in CareNotes® are the copyrighted property of A D A M [employer] Inc  or Rickey Douglass

## 2018-09-11 NOTE — PROGRESS NOTES
AMB US Pelvic Non OB  Date/Time: 9/11/2018 9:34 AM  Performed by: Radha Garcia  Authorized by: Gabriela Meyers     Procedure details:     Technique:  Transvaginal US, Non-OB    Position: lithotomy exam    Uterine findings:     Diameter (mm):  33    Length (mm):  74    Width (mm):  53    Endometrial stripe: identified      Endometrium thickness (mm):  3 8  Left ovary findings:     Left ovary:  Visualized    Diameter (mm):  15 7    Length (mm):  32 9    Width (mm):  22 8  Right ovary findings:     Right ovary:  Visualized    Diameter (mm):  14 2    Length (mm):  33 6    Width (mm):  18 4  Other findings:     Free pelvic fluid: identified    Post-Procedure Details:     Impression:  Anteverted uterus demonstrates an IUD in good position within the endometrium  Bilateral ovaries appear within normal limits  There is minimal free fluid noted within the cul de sac  Tolerance: Tolerated well, no immediate complications    Complications: no complications    Additional Procedure Comments:      Siemens Acuson X150 EC9-4 transvaginal transducer Serial # [id number] was used to perform the examination today and subsequently followed with high level disinfection utilizing Trophon EPR procedure

## 2018-11-05 ENCOUNTER — OFFICE VISIT (OUTPATIENT)
Dept: FAMILY MEDICINE CLINIC | Facility: CLINIC | Age: 33
End: 2018-11-05
Payer: COMMERCIAL

## 2018-11-05 VITALS
DIASTOLIC BLOOD PRESSURE: 60 MMHG | TEMPERATURE: 98 F | WEIGHT: 124 LBS | RESPIRATION RATE: 16 BRPM | BODY MASS INDEX: 22.82 KG/M2 | SYSTOLIC BLOOD PRESSURE: 100 MMHG | HEART RATE: 72 BPM | HEIGHT: 62 IN

## 2018-11-05 DIAGNOSIS — N39.0 URINARY TRACT INFECTION WITHOUT HEMATURIA, SITE UNSPECIFIED: Primary | ICD-10-CM

## 2018-11-05 LAB
SL AMB  POCT GLUCOSE, UA: NORMAL
SL AMB LEUKOCYTE ESTERASE,UA: ABNORMAL
SL AMB POCT BILIRUBIN,UA: NEGATIVE
SL AMB POCT BLOOD,UA: ABNORMAL
SL AMB POCT CLARITY,UA: CLEAR
SL AMB POCT COLOR,UA: YELLOW
SL AMB POCT KETONES,UA: NEGATIVE
SL AMB POCT NITRITE,UA: NEGATIVE
SL AMB POCT PH,UA: 7
SL AMB POCT SPECIFIC GRAVITY,UA: 1
SL AMB POCT URINE PROTEIN: 30
SL AMB POCT UROBILINOGEN: NORMAL

## 2018-11-05 PROCEDURE — 3008F BODY MASS INDEX DOCD: CPT | Performed by: FAMILY MEDICINE

## 2018-11-05 PROCEDURE — 99213 OFFICE O/P EST LOW 20 MIN: CPT | Performed by: FAMILY MEDICINE

## 2018-11-05 PROCEDURE — 87086 URINE CULTURE/COLONY COUNT: CPT | Performed by: FAMILY MEDICINE

## 2018-11-05 PROCEDURE — 81002 URINALYSIS NONAUTO W/O SCOPE: CPT | Performed by: FAMILY MEDICINE

## 2018-11-05 RX ORDER — SULFAMETHOXAZOLE AND TRIMETHOPRIM 800; 160 MG/1; MG/1
1 TABLET ORAL EVERY 12 HOURS SCHEDULED
Qty: 10 TABLET | Refills: 0 | Status: SHIPPED | OUTPATIENT
Start: 2018-11-05 | End: 2018-11-10

## 2018-11-05 NOTE — PROGRESS NOTES
Assessment/Plan:    Urine culture sent to the lab  Patient was started on Bactrim DS 1 b i d  x5 days and instructed to increase fluids and rest   Return to the office in 1 week or sooner krystin Jay Diagnoses and all orders for this visit:    Urinary tract infection without hematuria, site unspecified  Comments:  UA reveals 2+ leukocytes and positive blood  Urine culture  Bactrim DS 1 b i d  x5 days, increase fluids and rest   Orders:  -     POCT urine dip  -     sulfamethoxazole-trimethoprim (BACTRIM DS) 800-160 mg per tablet; Take 1 tablet by mouth every 12 (twelve) hours for 5 days  -     Urine culture          Subjective:      Patient ID: Mian Valentine is a 34 y o  female  Patient started 3-4 days ago with symptoms of UTI  She complains of increased frequency, pain and burning with urination  She admits to urgency and hematuria  Patient denies fever or flank pain  She has treated this with increased fluids with some relief  Urinary Tract Infection    This is a new problem  The current episode started in the past 7 days  The problem has been gradually worsening  The quality of the pain is described as burning  There has been no fever  Associated symptoms include chills, frequency, hematuria and urgency  Pertinent negatives include no hesitancy, nausea, possible pregnancy, sweats or vomiting  She has tried increased fluids and NSAIDs for the symptoms  The treatment provided moderate relief  Her past medical history is significant for recurrent UTIs  The following portions of the patient's history were reviewed and updated as appropriate: allergies, current medications, past family history, past medical history, past social history, past surgical history and problem list     Review of Systems   Constitutional: Positive for chills  Gastrointestinal: Negative for nausea and vomiting  Genitourinary: Positive for frequency, hematuria and urgency  Negative for hesitancy  Objective:      /60 (BP Location: Left arm, Patient Position: Sitting, Cuff Size: Adult)   Pulse 72   Temp 98 °F (36 7 °C)   Resp 16   Ht 5' 2 21" (1 58 m)   Wt 56 2 kg (124 lb)   BMI 22 53 kg/m²          Physical Exam   Constitutional: She is oriented to person, place, and time  She appears well-developed and well-nourished  No distress  HENT:   Head: Normocephalic  Mouth/Throat: Oropharynx is clear and moist    Eyes: Conjunctivae are normal  No scleral icterus  Neck: Neck supple  Cardiovascular: Normal rate and regular rhythm  Pulmonary/Chest: Effort normal and breath sounds normal    Abdominal: Soft  There is tenderness  Mild suprapubic tenderness  Negative CVA tenderness  Musculoskeletal: She exhibits no edema  Lymphadenopathy:     She has no cervical adenopathy  Neurological: She is alert and oriented to person, place, and time  Skin: Skin is warm and dry  Psychiatric: She has a normal mood and affect

## 2018-11-06 ENCOUNTER — ANNUAL EXAM (OUTPATIENT)
Dept: OBGYN CLINIC | Facility: CLINIC | Age: 33
End: 2018-11-06
Payer: COMMERCIAL

## 2018-11-06 VITALS
DIASTOLIC BLOOD PRESSURE: 74 MMHG | BODY MASS INDEX: 22.86 KG/M2 | HEIGHT: 62 IN | WEIGHT: 124.2 LBS | SYSTOLIC BLOOD PRESSURE: 116 MMHG

## 2018-11-06 DIAGNOSIS — D06.9 CIN III WITH SEVERE DYSPLASIA: ICD-10-CM

## 2018-11-06 DIAGNOSIS — N91.2 AMENORRHEA: ICD-10-CM

## 2018-11-06 DIAGNOSIS — D06.9 SEVERE CERVICAL DYSPLASIA: Primary | ICD-10-CM

## 2018-11-06 DIAGNOSIS — Z11.51 SCREENING FOR HPV (HUMAN PAPILLOMAVIRUS): ICD-10-CM

## 2018-11-06 DIAGNOSIS — Z12.4 SCREENING FOR CERVICAL CANCER: ICD-10-CM

## 2018-11-06 DIAGNOSIS — Z97.5 IUD CONTRACEPTION: ICD-10-CM

## 2018-11-06 LAB — BACTERIA UR CULT: NORMAL

## 2018-11-06 PROCEDURE — 81025 URINE PREGNANCY TEST: CPT | Performed by: OBSTETRICS & GYNECOLOGY

## 2018-11-06 PROCEDURE — G0145 SCR C/V CYTO,THINLAYER,RESCR: HCPCS | Performed by: PATHOLOGY

## 2018-11-06 PROCEDURE — 87624 HPV HI-RISK TYP POOLED RSLT: CPT | Performed by: OBSTETRICS & GYNECOLOGY

## 2018-11-06 PROCEDURE — 99395 PREV VISIT EST AGE 18-39: CPT | Performed by: OBSTETRICS & GYNECOLOGY

## 2018-11-06 PROCEDURE — G0124 SCREEN C/V THIN LAYER BY MD: HCPCS | Performed by: PATHOLOGY

## 2018-11-06 NOTE — PATIENT INSTRUCTIONS
Intrauterine Device   WHAT YOU NEED TO KNOW:   What is an intrauterine device? An intrauterine device (IUD) is a type of birth control that is inserted into your uterus  It is a small, flexible piece of plastic with a string on the end  It is inserted and removed by your healthcare provider  IUDs prevent sperm from reaching or fertilizing an egg  IUDs also prevent a fertilized egg from attaching to the uterus and developing into a fetus  What are the most common types of IUDs? · Copper: This type of IUD slowly releases a small amount of copper into your uterus  This IUD can remain in place for up to 10 years  · Hormone-releasing: This type of IUD slowly releases a small amount of progesterone into your uterus  Progesterone is a hormone that is made by your body to help control your periods  This IUD can remain in place for up to 5 years  What are the advantages of an IUD? · Effective: An IUD is 98% to 99% effective in preventing pregnancy  · Easily removable: The IUD can be removed if you decide to have a baby  You may be able to get pregnant as soon as the IUD is removed  · Immediate:  An IUD protects you from pregnancy right after it is inserted  · Convenient:  You do not have to stop sexual activity to insert it or worry about remembering to take your birth control pill  · Safe:  Copper IUDs are safer for some women than oral birth control pills  Examples include women who smoke or have a history of blood clots  · Health effects:  Hormone-releasing IUDs may decrease certain health problems  Examples include bleeding and cramping that happen with your monthly period  What are the risks of an IUD? · An IUD does not protect you from sexually transmitted infections  You may have cramps during the first weeks after you get the IUD  A copper IUD may cause your monthly period to be heavier or more painful  This is more common within the first 3 months after you get the IUD   You may need to have your IUD removed if your bleeding or pain becomes severe  You may have spotting between periods  · There is a small chance that you could get pregnant  Sometimes the IUD cannot be removed after you get pregnant  This increases your risk of a miscarriage or an ectopic pregnancy  Ectopic pregnancy is when the fertilized egg starts to grow somewhere other than your uterus  There is a small risk of an infection within the first 20 days after the IUD is placed  Infection can lead to pelvic inflammatory disease  This can cause infertility  Your uterus may tear when the IUD is inserted  The IUD may slip part or all of the way out of your uterus  How is the IUD inserted? · The IUD is usually inserted during your monthly period  This may help decrease the amount of discomfort you have during the procedure  It also helps ensure that you are not pregnant  You will be asked to lie down and place your feet in stirrups  Your healthcare provider will gently insert a speculum into your vagina  This is the same tool used during a pap smear  The speculum allows your healthcare provider to see inside your vagina to your cervix  The cervix is the opening of your uterus  · Your healthcare provider will clean your cervix with an antiseptic solution to prevent infection  You may be given numbing medicine  A long plastic tube is gently passed through your cervix and into your uterus  This tube has the IUD inside of it  The IUD is pushed out of the tube and into your uterus  You may have cramps as the IUD is inserted  The tube is removed after the IUD is in place  How can I make sure my IUD is still in place? An IUD has a string made of plastic thread  One to 2 inches of this string hangs into your vagina  You cannot see this string, and it will not cause problems when you have sex  Check your IUD string every 3 days for the first 3 months after it is inserted  After that, check the string after each monthly period   Do the following to check the placement of your IUD:  · Wash your hands with soap and warm water  Dry them with a clean towel  · Bend your knees and squat low to the ground  · Gently put your index finger inside your vagina  The cervix is at the top of the vagina and feels like the tip of your nose  Feel for the IUD string  Do not pull on the string  You should not be able to feel the firm plastic of the IUD itself  · Wash your hands after you check your IUD string  Where can I find more information? · [facility] [address] , [address] [place]  Phone: 5- 425 [phone]  Web Address: https://[employer]  org  When should I contact my healthcare provider? · You think you are pregnant  · You bleed after you have sex  · You have pain during sex  · You cannot feel the IUD string, the string feels longer, or you feel the plastic of the IUD itself  · You have vaginal discharge that is green, yellow, or has a foul odor  · You have questions or concerns about your condition or care  When should I seek immediate care? · You have severe pain or bleeding during your period  · You have a fever and severe abdominal pain  CARE AGREEMENT:   You have the right to help plan your care  Learn about your health condition and how it may be treated  Discuss treatment options with your caregivers to decide what care you want to receive  You always have the right to refuse treatment  The above information is an  only  It is not intended as medical advice for individual conditions or treatments  Talk to your doctor, nurse or pharmacist before following any medical regimen to see if it is safe and effective for you  © 2017 [employer] Information is for End User's use only and may not be sold, redistributed or otherwise used for commercial purposes   All illustrations and images included in CareNotes® are the copyrighted property of A D A M [employer] Inc  or Rickey Douglass

## 2018-11-07 LAB — SL AMB POCT URINE HCG: NEGATIVE

## 2018-11-09 LAB
HPV HR 12 DNA CVX QL NAA+PROBE: NEGATIVE
HPV16 DNA CVX QL NAA+PROBE: NEGATIVE
HPV18 DNA CVX QL NAA+PROBE: NEGATIVE

## 2018-11-13 ENCOUNTER — TELEPHONE (OUTPATIENT)
Dept: OBGYN CLINIC | Facility: CLINIC | Age: 33
End: 2018-11-13

## 2018-11-13 LAB
LAB AP GYN PRIMARY INTERPRETATION: NORMAL
Lab: NORMAL
PATH INTERP SPEC-IMP: NORMAL

## 2018-11-13 NOTE — TELEPHONE ENCOUNTER
----- Message from Bran Zurita MD sent at 11/13/2018 12:40 PM EST -----  Pap with ASCUS, negative HP V  Please inform the patient  This is a good result in follow-up to her previous high-grade dysplasia  No intervention is recommended  Recommend follow-up in 1 year's time

## 2018-11-13 NOTE — TELEPHONE ENCOUNTER
----- Message from Asha Stark MD sent at 11/13/2018 12:40 PM EST -----  Pap with ASCUS, negative HP V  Please inform the patient  This is a good result in follow-up to her previous high-grade dysplasia  No intervention is recommended  Recommend follow-up in 1 year's time

## 2018-12-16 ENCOUNTER — APPOINTMENT (EMERGENCY)
Dept: CT IMAGING | Facility: HOSPITAL | Age: 33
End: 2018-12-16
Payer: COMMERCIAL

## 2018-12-16 ENCOUNTER — HOSPITAL ENCOUNTER (EMERGENCY)
Facility: HOSPITAL | Age: 33
Discharge: HOME | End: 2018-12-16
Attending: EMERGENCY MEDICINE
Payer: COMMERCIAL

## 2018-12-16 VITALS
HEART RATE: 78 BPM | WEIGHT: 126.7 LBS | SYSTOLIC BLOOD PRESSURE: 111 MMHG | OXYGEN SATURATION: 99 % | RESPIRATION RATE: 18 BRPM | TEMPERATURE: 97.3 F | BODY MASS INDEX: 23.17 KG/M2 | DIASTOLIC BLOOD PRESSURE: 80 MMHG

## 2018-12-16 DIAGNOSIS — M54.9 BACK PAIN: Primary | ICD-10-CM

## 2018-12-16 LAB
ALBUMIN SERPL BCP-MCNC: 4 G/DL (ref 3.5–5)
ALP SERPL-CCNC: 71 U/L (ref 46–116)
ALT SERPL W P-5'-P-CCNC: 18 U/L (ref 12–78)
ANION GAP SERPL CALCULATED.3IONS-SCNC: 9 MMOL/L (ref 4–13)
AST SERPL W P-5'-P-CCNC: 16 U/L (ref 5–45)
BACTERIA UR QL AUTO: ABNORMAL /HPF
BASOPHILS # BLD AUTO: 0.04 THOUSANDS/ΜL (ref 0–0.1)
BASOPHILS NFR BLD AUTO: 1 % (ref 0–1)
BILIRUB DIRECT SERPL-MCNC: 0.11 MG/DL (ref 0–0.2)
BILIRUB SERPL-MCNC: 0.41 MG/DL (ref 0.2–1)
BILIRUB UR QL STRIP: NEGATIVE
BUN SERPL-MCNC: 12 MG/DL (ref 5–25)
CALCIUM SERPL-MCNC: 8.9 MG/DL (ref 8.3–10.1)
CHLORIDE SERPL-SCNC: 104 MMOL/L (ref 100–108)
CLARITY UR: CLEAR
CO2 SERPL-SCNC: 28 MMOL/L (ref 21–32)
COLOR UR: YELLOW
COLOR, POC: YELLOW
CREAT SERPL-MCNC: 0.7 MG/DL (ref 0.6–1.3)
EOSINOPHIL # BLD AUTO: 0.08 THOUSAND/ΜL (ref 0–0.61)
EOSINOPHIL NFR BLD AUTO: 2 % (ref 0–6)
ERYTHROCYTE [DISTWIDTH] IN BLOOD BY AUTOMATED COUNT: 12.7 % (ref 11.6–15.1)
EXT PREG TEST URINE: NEGATIVE
GFR SERPL CREATININE-BSD FRML MDRD: 116 ML/MIN/1.73SQ M
GLUCOSE SERPL-MCNC: 104 MG/DL (ref 65–140)
GLUCOSE UR STRIP-MCNC: NEGATIVE MG/DL
HCT VFR BLD AUTO: 42.7 % (ref 34.8–46.1)
HGB BLD-MCNC: 14.1 G/DL (ref 11.5–15.4)
HGB UR QL STRIP.AUTO: NEGATIVE
IMM GRANULOCYTES # BLD AUTO: 0.01 THOUSAND/UL (ref 0–0.2)
IMM GRANULOCYTES NFR BLD AUTO: 0 % (ref 0–2)
KETONES UR STRIP-MCNC: NEGATIVE MG/DL
LEUKOCYTE ESTERASE UR QL STRIP: ABNORMAL
LYMPHOCYTES # BLD AUTO: 2.01 THOUSANDS/ΜL (ref 0.6–4.47)
LYMPHOCYTES NFR BLD AUTO: 38 % (ref 14–44)
MAGNESIUM SERPL-MCNC: 2.3 MG/DL (ref 1.6–2.6)
MCH RBC QN AUTO: 32 PG (ref 26.8–34.3)
MCHC RBC AUTO-ENTMCNC: 33 G/DL (ref 31.4–37.4)
MCV RBC AUTO: 97 FL (ref 82–98)
MONOCYTES # BLD AUTO: 0.39 THOUSAND/ΜL (ref 0.17–1.22)
MONOCYTES NFR BLD AUTO: 7 % (ref 4–12)
NEUTROPHILS # BLD AUTO: 2.81 THOUSANDS/ΜL (ref 1.85–7.62)
NEUTS SEG NFR BLD AUTO: 52 % (ref 43–75)
NITRITE UR QL STRIP: NEGATIVE
NON-SQ EPI CELLS URNS QL MICRO: ABNORMAL /HPF
NRBC BLD AUTO-RTO: 0 /100 WBCS
PH UR STRIP.AUTO: 7 [PH] (ref 4.5–8)
PLATELET # BLD AUTO: 207 THOUSANDS/UL (ref 149–390)
PMV BLD AUTO: 10.6 FL (ref 8.9–12.7)
POTASSIUM SERPL-SCNC: 4.1 MMOL/L (ref 3.5–5.3)
PROT SERPL-MCNC: 7.3 G/DL (ref 6.4–8.2)
PROT UR STRIP-MCNC: NEGATIVE MG/DL
RBC # BLD AUTO: 4.41 MILLION/UL (ref 3.81–5.12)
RBC #/AREA URNS AUTO: ABNORMAL /HPF
SODIUM SERPL-SCNC: 141 MMOL/L (ref 136–145)
SP GR UR STRIP.AUTO: 1.02 (ref 1–1.03)
UROBILINOGEN UR QL STRIP.AUTO: 0.2 E.U./DL
WBC # BLD AUTO: 5.34 THOUSAND/UL (ref 4.31–10.16)
WBC #/AREA URNS AUTO: ABNORMAL /HPF

## 2018-12-16 PROCEDURE — 99284 EMERGENCY DEPT VISIT MOD MDM: CPT

## 2018-12-16 PROCEDURE — 74176 CT ABD & PELVIS W/O CONTRAST: CPT

## 2018-12-16 PROCEDURE — 36415 COLL VENOUS BLD VENIPUNCTURE: CPT | Performed by: EMERGENCY MEDICINE

## 2018-12-16 PROCEDURE — 80076 HEPATIC FUNCTION PANEL: CPT | Performed by: EMERGENCY MEDICINE

## 2018-12-16 PROCEDURE — 81025 URINE PREGNANCY TEST: CPT | Performed by: EMERGENCY MEDICINE

## 2018-12-16 PROCEDURE — 96361 HYDRATE IV INFUSION ADD-ON: CPT

## 2018-12-16 PROCEDURE — 83735 ASSAY OF MAGNESIUM: CPT | Performed by: EMERGENCY MEDICINE

## 2018-12-16 PROCEDURE — 80048 BASIC METABOLIC PNL TOTAL CA: CPT | Performed by: EMERGENCY MEDICINE

## 2018-12-16 PROCEDURE — 85025 COMPLETE CBC W/AUTO DIFF WBC: CPT | Performed by: EMERGENCY MEDICINE

## 2018-12-16 PROCEDURE — 96374 THER/PROPH/DIAG INJ IV PUSH: CPT

## 2018-12-16 PROCEDURE — 81001 URINALYSIS AUTO W/SCOPE: CPT

## 2018-12-16 RX ORDER — NAPROXEN 500 MG/1
500 TABLET ORAL 2 TIMES DAILY WITH MEALS
Qty: 20 TABLET | Refills: 0 | Status: SHIPPED | OUTPATIENT
Start: 2018-12-16 | End: 2019-03-26

## 2018-12-16 RX ORDER — KETOROLAC TROMETHAMINE 30 MG/ML
15 INJECTION, SOLUTION INTRAMUSCULAR; INTRAVENOUS ONCE
Status: COMPLETED | OUTPATIENT
Start: 2018-12-16 | End: 2018-12-16

## 2018-12-16 RX ORDER — METHOCARBAMOL 750 MG/1
750 TABLET, FILM COATED ORAL 2 TIMES DAILY
Qty: 20 TABLET | Refills: 0 | Status: SHIPPED | OUTPATIENT
Start: 2018-12-16 | End: 2019-01-02

## 2018-12-16 RX ADMIN — KETOROLAC TROMETHAMINE 15 MG: 30 INJECTION, SOLUTION INTRAMUSCULAR at 12:21

## 2018-12-16 RX ADMIN — SODIUM CHLORIDE 1000 ML: 0.9 INJECTION, SOLUTION INTRAVENOUS at 12:21

## 2018-12-16 NOTE — DISCHARGE INSTRUCTIONS
Take the naprosyn twice daily for the next 10 days  Use the Robaxin as needed for muscle spasm  Use an electric heating pad over your sore muscles, 4-5 times daily, 20 minutes each time  Make sure to drink plenty of fluids  Back Pain   WHAT YOU NEED TO KNOW:   Back pain is common  It can be caused by many conditions, such as arthritis or the breakdown of spinal discs  Your risk for back pain is increased by injuries, lack of activity, or repeated bending and twisting  You may feel sore or stiff on one or both sides of your back  The pain may spread to your buttocks or thighs  DISCHARGE INSTRUCTIONS:   Medicines:   · NSAIDs  help decrease swelling and pain  This medicine is available with or without a doctor's order  NSAIDs can cause stomach bleeding or kidney problems in certain people  If you take blood thinner medicine, always ask your healthcare provider if NSAIDs are safe for you  Always read the medicine label and follow directions  · Acetaminophen  decreases pain  It is available without a doctor's order  Ask how much to take and how often to take it  Follow directions  Acetaminophen can cause liver damage if not taken correctly  · Prescription pain medicine  may be given  Ask your healthcare provider how to take this medicine safely  · Take your medicine as directed  Contact your healthcare provider if you think your medicine is not helping or if you have side effects  Tell him or her if you are allergic to any medicine  Keep a list of the medicines, vitamins, and herbs you take  Include the amounts, and when and why you take them  Bring the list or the pill bottles to follow-up visits  Carry your medicine list with you in case of an emergency  Follow up with your healthcare provider in 2 weeks, or as directed:  Write down your questions so you remember to ask them during your visits    How to manage your back pain:   · Apply ice  on your back or affected area for 15 to 20 minutes every hour or as directed  Use an ice pack, or put crushed ice in a plastic bag  Cover it with a towel  Ice helps prevent tissue damage and decreases pain  · Apply heat  on your back or affected area for 20 to 30 minutes every 2 hours for as many days as directed  Heat helps decrease pain and muscle spasms  · Stay active  as much as you can without causing more pain  Bed rest could make your back pain worse  Avoid heavy lifting until your pain is gone  Return to the emergency department if:   · You have pain, numbness, or weakness in one or both legs  · Your pain becomes so severe that you cannot walk  · You cannot control your urine or bowel movements  · You have severe back pain with chest pain  · You have severe back pain, nausea, and vomiting  · You have severe back pain that spreads to your side or genital area  Contact your healthcare provider if:   · You have back pain that does not get better with rest and pain medicine  · You have a fever  · You have pain that worsens when you are on your back or when you rest     · You have pain that worsens when you cough or sneeze  · You lose weight without trying  · You have questions or concerns about your condition or care  © 2017 [address]  Information is for End User's use only and may not be sold, redistributed or otherwise used for commercial purposes  All illustrations and images included in CareNotes® are the copyrighted property of [employer]  or Rickey Douglass  The above information is an  only  It is not intended as medical advice for individual conditions or treatments  Talk to your doctor, nurse or pharmacist before following any medical regimen to see if it is safe and effective for you

## 2018-12-16 NOTE — ED PROVIDER NOTES
History  Chief Complaint   Patient presents with    Back Pain     Pt reports R sided lower back pain worsening since yesterday afternoon, reports history of kidney stones and kidney infection "it's jared simila to that", denies urinary symptoms  33 YO female presents with Right sided back pain last night  States this was rather sudden in onset, constant, worsening, located primarily in the Right flank and radiating around the side to the abdomen  Pt took NSAIDs for this with minimal relief  She has had kidney stones in the past but thinks this feels different  Pt denies nausea or vomiting, no dysuria  Pt denies CP/SOB/F/C/N/V/D/C, no dysuria, burning on urination or blood in urine  History provided by:  Patient and parent   used: No    Flank Pain   Pain location:  R flank  Pain quality: aching and sharp    Pain radiates to:  RUQ  Pain severity:  Moderate  Onset quality:  Gradual  Duration:  1 day  Timing:  Constant  Progression:  Unchanged  Chronicity:  New  Relieved by:  Nothing  Worsened by:  Nothing  Associated symptoms: no chest pain, no chills, no constipation, no cough, no diarrhea, no dysuria, no fatigue, no fever, no hematuria, no melena, no nausea, no shortness of breath and no vomiting        Prior to Admission Medications   Prescriptions Last Dose Informant Patient Reported? Taking?    Biotin 1 MG CAPS   Yes Yes   Sig: Take by mouth   levonorgestrel (MIRENA) 20 MCG/24HR IUD  Self Yes Yes   Si each by Intrauterine route once      Facility-Administered Medications: None       Past Medical History:   Diagnosis Date    Abnormal Pap smear of cervix     Adnexal fullness     Amenorrhea     Anxiety     Arthralgia of toe     Exposure to parvovirus     GERD (gastroesophageal reflux disease)     HPV (human papilloma virus) infection     Nephrolithiasis     Umbilical hernia     Urinary tract infection        Past Surgical History:   Procedure Laterality Date    CERVICAL BIOPSY  W/ LOOP ELECTRODE EXCISION      COLPOSCOPY W/ BIOPSY / CURETTAGE      OSTEOTOMY AND ULNAR SHORTENING Right     TOOTH EXTRACTION      WRIST SURGERY         Family History   Problem Relation Age of Onset    Diabetes Maternal Grandmother     Heart disease Paternal Grandfather     Hypertension Paternal Grandfather     Hyperlipidemia Paternal Grandfather     Hypertension Father     Hyperlipidemia Father     Heart disease Maternal Grandfather     Diabetes Paternal Grandmother     Diabetes Family      I have reviewed and agree with the history as documented  Social History   Substance Use Topics    Smoking status: Never Smoker    Smokeless tobacco: Never Used    Alcohol use Yes      Comment: occasional        Review of Systems   Constitutional: Negative for chills, fatigue and fever  HENT: Negative for dental problem  Eyes: Negative for visual disturbance  Respiratory: Negative for cough and shortness of breath  Cardiovascular: Negative for chest pain  Gastrointestinal: Negative for abdominal pain, constipation, diarrhea, melena, nausea and vomiting  Genitourinary: Positive for flank pain  Negative for dysuria, frequency and hematuria  Musculoskeletal: Negative for arthralgias  Skin: Negative for rash  Neurological: Negative for dizziness, weakness and light-headedness  Psychiatric/Behavioral: Negative for agitation, behavioral problems and confusion  All other systems reviewed and are negative  Physical Exam  Physical Exam   Constitutional: She is oriented to person, place, and time  She appears well-developed and well-nourished  HENT:   Head: Normocephalic and atraumatic  Eyes: EOM are normal    Neck: Normal range of motion  Cardiovascular: Normal rate, regular rhythm and normal heart sounds  Pulmonary/Chest: Effort normal and breath sounds normal    Abdominal: Soft  Musculoskeletal: Normal range of motion          Back:    Neurological: She is alert and oriented to person, place, and time  Skin: Skin is warm and dry  Psychiatric: She has a normal mood and affect  Her behavior is normal  Thought content normal    Nursing note and vitals reviewed  Vital Signs  ED Triage Vitals [12/16/18 1115]   Temperature Pulse Respirations Blood Pressure SpO2   (!) 97 3 °F (36 3 °C) 80 18 144/77 98 %      Temp Source Heart Rate Source Patient Position - Orthostatic VS BP Location FiO2 (%)   Oral Monitor Sitting Right arm --      Pain Score       8           Vitals:    12/16/18 1115 12/16/18 1144 12/16/18 1307 12/16/18 1415   BP: 144/77 111/87 116/77 111/80   Pulse: 80 79 70 78   Patient Position - Orthostatic VS: Sitting Sitting Sitting Sitting       Visual Acuity      ED Medications  Medications   sodium chloride 0 9 % bolus 1,000 mL (0 mL Intravenous Stopped 12/16/18 1415)   ketorolac (TORADOL) injection 15 mg (15 mg Intravenous Given 12/16/18 1221)       Diagnostic Studies  Results Reviewed     Procedure Component Value Units Date/Time    Basic metabolic panel [[id number]] Collected:  12/16/18 1220    Lab Status:  Final result Specimen:  Blood from Arm, Left Updated:  12/16/18 1241     Sodium 141 mmol/L      Potassium 4 1 mmol/L      Chloride 104 mmol/L      CO2 28 mmol/L      ANION GAP 9 mmol/L      BUN 12 mg/dL      Creatinine 0 70 mg/dL      Glucose 104 mg/dL      Calcium 8 9 mg/dL      eGFR 116 ml/min/1 73sq m     Narrative:         National Kidney Disease Education Program recommendations are as follows:  GFR calculation is accurate only with a steady state creatinine  Chronic Kidney disease less than 60 ml/min/1 73 sq  meters  Kidney failure less than 15 ml/min/1 73 sq  meters      Magnesium [[id number]]  (Normal) Collected:  12/16/18 1220    Lab Status:  Final result Specimen:  Blood from Arm, Left Updated:  12/16/18 1241     Magnesium 2 3 mg/dL     Hepatic function panel [[id number]]  (Normal) Collected:  12/16/18 1220    Lab Status:  Final result Specimen:  Blood from Arm, Left Updated:  12/16/18 1241     Total Bilirubin 0 41 mg/dL      Bilirubin, Direct 0 11 mg/dL      Alkaline Phosphatase 71 U/L      AST 16 U/L      ALT 18 U/L      Total Protein 7 3 g/dL      Albumin 4 0 g/dL     CBC and differential [[id number]] Collected:  12/16/18 1220    Lab Status:  Final result Specimen:  Blood from Arm, Left Updated:  12/16/18 1230     WBC 5 34 Thousand/uL      RBC 4 41 Million/uL      Hemoglobin 14 1 g/dL      Hematocrit 42 7 %      MCV 97 fL      MCH 32 0 pg      MCHC 33 0 g/dL      RDW 12 7 %      MPV 10 6 fL      Platelets 504 Thousands/uL      nRBC 0 /100 WBCs      Neutrophils Relative 52 %      Immat GRANS % 0 %      Lymphocytes Relative 38 %      Monocytes Relative 7 %      Eosinophils Relative 2 %      Basophils Relative 1 %      Neutrophils Absolute 2 81 Thousands/µL      Immature Grans Absolute 0 01 Thousand/uL      Lymphocytes Absolute 2 01 Thousands/µL      Monocytes Absolute 0 39 Thousand/µL      Eosinophils Absolute 0 08 Thousand/µL      Basophils Absolute 0 04 Thousands/µL     POCT urinalysis dipstick [[id number]]  (Abnormal) Resulted:  12/16/18 1126    Lab Status:  Edited Result - FINAL Updated:  12/16/18 1149     Color, UA yellow    POCT pregnancy, urine [[id number]]  (Normal) Resulted:  12/16/18 1126    Lab Status:  Final result Updated:  12/16/18 1148     EXT PREG TEST UR (Ref: Negative) negative    Urine Microscopic [[id number]]  (Abnormal) Collected:  12/16/18 1140    Lab Status:  Final result Specimen:  Urine from Urine, Clean Catch Updated:  12/16/18 1146     RBC, UA None Seen /hpf      WBC, UA 2-4 (A) /hpf      Epithelial Cells Occasional /hpf      Bacteria, UA Occasional /hpf     ED Urine Macroscopic [[id number]]  (Abnormal) Collected:  12/16/18 1140    Lab Status:  Final result Specimen:  Urine Updated:  12/16/18 1126     Color, UA Yellow     Clarity, UA Clear     pH, UA 7 0     Leukocytes, UA Trace (A)     Nitrite, UA Negative     Protein, UA Negative mg/dl      Glucose, UA Negative mg/dl      Ketones, UA Negative mg/dl      Urobilinogen, UA 0 2 E U /dl      Bilirubin, UA Negative     Blood, UA Negative     Specific Gravity, UA 1 020    Narrative:       [facility] RESULT                 CT renal stone study abdomen pelvis without contrast   Final Result by Davide Styles MD (12/16 9950)         No renal stones  No small bowel obstruction  Normal appendix  Consider contrast exam in the appropriate clinical setting  Workstation performed: [id number]                    Procedures  Procedures       Phone Contacts  ED Phone Contact    ED Course                               MDM  Number of Diagnoses or Management Options  Back pain: new and requires workup  Diagnosis management comments: 1  Right flank pain - Pt with symptoms since yesterday  Will check urine for infection, blood, LFT's for liver dysfunction  Will CT to rule out stone  Give fluids and NSAIDs  Amount and/or Complexity of Data Reviewed  Clinical lab tests: ordered and reviewed  Tests in the radiology section of CPT®: ordered and reviewed  Obtain history from someone other than the patient: yes  Independent visualization of images, tracings, or specimens: yes    Patient Progress  Patient progress: stable    CritCare Time    Disposition  Final diagnoses:   Back pain     Time reflects when diagnosis was documented in both MDM as applicable and the Disposition within this note     Time User Action Codes Description Comment    12/16/2018  2:22 PM El Aguilera [M54 9] Back pain       ED Disposition     ED Disposition Condition Comment    Discharge  Jose Elias Cabello discharge to home/self care      Condition at discharge: Stable        Follow-up Information    None         Discharge Medication List as of 12/16/2018  2:23 PM      START taking these medications    Details   methocarbamol (ROBAXIN) 750 mg tablet Take 1 tablet (750 mg total) by mouth 2 (two) times a day, Starting Sun 12/16/2018, Print      naproxen (NAPROSYN) 500 mg tablet Take 1 tablet (500 mg total) by mouth 2 (two) times a day with meals for 10 days, Starting Sun 12/16/2018, Until Wed 12/26/2018, Print         CONTINUE these medications which have NOT CHANGED    Details   Biotin 1 MG CAPS Take by mouth, Historical Med      levonorgestrel (MIRENA) 20 MCG/24HR IUD 1 each by Intrauterine route once, Historical Med           No discharge procedures on file      ED Provider  Electronically Signed by           Shanita Onofre MD  12/16/18 9425

## 2018-12-16 NOTE — ED NOTES
Patient stated that she have the mirena and got her period last week     Dustin Rose RN  12/16/18 6081

## 2019-01-02 ENCOUNTER — OFFICE VISIT (OUTPATIENT)
Dept: FAMILY MEDICINE CLINIC | Facility: CLINIC | Age: 34
End: 2019-01-02
Payer: COMMERCIAL

## 2019-01-02 VITALS
RESPIRATION RATE: 16 BRPM | BODY MASS INDEX: 23.55 KG/M2 | TEMPERATURE: 97.6 F | SYSTOLIC BLOOD PRESSURE: 106 MMHG | DIASTOLIC BLOOD PRESSURE: 70 MMHG | HEIGHT: 62 IN | WEIGHT: 128 LBS | HEART RATE: 72 BPM

## 2019-01-02 DIAGNOSIS — H66.001 ACUTE SUPPURATIVE OTITIS MEDIA OF RIGHT EAR WITHOUT SPONTANEOUS RUPTURE OF TYMPANIC MEMBRANE, RECURRENCE NOT SPECIFIED: Primary | ICD-10-CM

## 2019-01-02 PROCEDURE — 3008F BODY MASS INDEX DOCD: CPT | Performed by: FAMILY MEDICINE

## 2019-01-02 PROCEDURE — 99213 OFFICE O/P EST LOW 20 MIN: CPT | Performed by: FAMILY MEDICINE

## 2019-01-02 RX ORDER — AMOXICILLIN AND CLAVULANATE POTASSIUM 875; 125 MG/1; MG/1
1 TABLET, FILM COATED ORAL EVERY 12 HOURS SCHEDULED
Qty: 20 TABLET | Refills: 0 | Status: SHIPPED | OUTPATIENT
Start: 2019-01-02 | End: 2019-01-12

## 2019-01-03 NOTE — PROGRESS NOTES
Assessment/Plan:  Patient will be started on Augmentin 875 mg 1 b i d  with food for 10 days  Patient may take Tylenol or Motrin p r n  Recommend patient take Robitussin DM or Mucinex DM p r n   Increase fluids and rest   Return to the office in 1 week or sooner p r n  Tracie Gambino Diagnoses and all orders for this visit:    Acute suppurative otitis media of right ear without spontaneous rupture of tympanic membrane, recurrence not specified  Comments:  Augmentin 875 mg 1 b i d  with food for 10 days  Tylenol or Motrin p r n   Robitussin DM or Mucinex DM p r n   Increase fluids and rest   Orders:  -     amoxicillin-clavulanate (AUGMENTIN) 875-125 mg per tablet; Take 1 tablet by mouth every 12 (twelve) hours for 10 days          Subjective:      Patient ID: Kvng Sanchez is a 34 y o  female  Patient complains of cold symptoms for the past 1 week  Since yesterday she complains of right earache  She denies any fever  Earache    There is pain in the right ear  The current episode started in the past 7 days  The problem has been gradually worsening  There has been no fever  Associated symptoms include coughing, headaches, hearing loss and a sore throat  Pertinent negatives include no rhinorrhea  Treatments tried: Faith   The treatment provided mild relief  There is no history of a chronic ear infection, hearing loss or a tympanostomy tube  URI    Associated symptoms include coughing, ear pain, headaches and a sore throat  Pertinent negatives include no rhinorrhea  The following portions of the patient's history were reviewed and updated as appropriate: allergies, current medications, past family history, past medical history, past social history, past surgical history and problem list     Review of Systems   HENT: Positive for ear pain, hearing loss and sore throat  Negative for rhinorrhea  Respiratory: Positive for cough  Neurological: Positive for headaches  Objective:      /70 (BP Location: Left arm, Patient Position: Sitting, Cuff Size: Standard)   Pulse 72   Temp 97 6 °F (36 4 °C) (Tympanic)   Resp 16   Ht 5' 2" (1 575 m)   Wt 58 1 kg (128 lb)   LMP 12/11/2018   BMI 23 41 kg/m²          Physical Exam   Constitutional: She is oriented to person, place, and time  She appears well-developed and well-nourished  HENT:   Head: Normocephalic  Right TM positive erythema  Left TM dull  Positive turbinates swelling with mucoid drainage  Throat postnasal drainage and injected  Eyes: Conjunctivae are normal  No scleral icterus  Neck: Neck supple  Cardiovascular: Normal rate and regular rhythm  Pulmonary/Chest: Effort normal and breath sounds normal    Abdominal: Soft  There is no tenderness  Musculoskeletal: She exhibits no edema  Lymphadenopathy:     She has no cervical adenopathy  Neurological: She is alert and oriented to person, place, and time  Skin: Skin is warm and dry  Psychiatric: She has a normal mood and affect

## 2019-01-09 ENCOUNTER — TELEPHONE (OUTPATIENT)
Dept: FAMILY MEDICINE CLINIC | Facility: CLINIC | Age: 34
End: 2019-01-09

## 2019-01-09 DIAGNOSIS — H66.90 ACUTE OTITIS MEDIA, UNSPECIFIED OTITIS MEDIA TYPE: Primary | ICD-10-CM

## 2019-01-09 RX ORDER — AZITHROMYCIN 250 MG/1
TABLET, FILM COATED ORAL
Qty: 6 TABLET | Refills: 0 | Status: SHIPPED | OUTPATIENT
Start: 2019-01-09 | End: 2019-01-13

## 2019-01-09 NOTE — TELEPHONE ENCOUNTER
Prescription sent to [facility] pharmacy in [place] for Zithromax  Please notify patient

## 2019-01-15 ENCOUNTER — TELEPHONE (OUTPATIENT)
Dept: OBGYN CLINIC | Facility: CLINIC | Age: 34
End: 2019-01-15

## 2019-01-15 NOTE — TELEPHONE ENCOUNTER
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

## 2019-02-11 ENCOUNTER — ULTRASOUND (OUTPATIENT)
Dept: OBGYN CLINIC | Facility: CLINIC | Age: 34
End: 2019-02-11
Payer: COMMERCIAL

## 2019-02-11 ENCOUNTER — TELEPHONE (OUTPATIENT)
Dept: OBGYN CLINIC | Facility: CLINIC | Age: 34
End: 2019-02-11

## 2019-02-11 ENCOUNTER — OFFICE VISIT (OUTPATIENT)
Dept: OBGYN CLINIC | Facility: CLINIC | Age: 34
End: 2019-02-11
Payer: COMMERCIAL

## 2019-02-11 VITALS — DIASTOLIC BLOOD PRESSURE: 62 MMHG | SYSTOLIC BLOOD PRESSURE: 102 MMHG | BODY MASS INDEX: 23.23 KG/M2 | WEIGHT: 127 LBS

## 2019-02-11 DIAGNOSIS — R10.2 PELVIC PAIN: Primary | ICD-10-CM

## 2019-02-11 DIAGNOSIS — R10.2 PELVIC PAIN: ICD-10-CM

## 2019-02-11 DIAGNOSIS — Z97.5 IUD CONTRACEPTION: Primary | ICD-10-CM

## 2019-02-11 DIAGNOSIS — Z30.09 BIRTH CONTROL COUNSELING: ICD-10-CM

## 2019-02-11 DIAGNOSIS — N83.202 LEFT OVARIAN CYST: ICD-10-CM

## 2019-02-11 PROBLEM — Z30.9 ENCOUNTER FOR CONTRACEPTIVE MANAGEMENT: Status: ACTIVE | Noted: 2019-02-11

## 2019-02-11 PROBLEM — N83.209 CYST OF OVARY: Status: ACTIVE | Noted: 2019-02-11

## 2019-02-11 PROCEDURE — 99214 OFFICE O/P EST MOD 30 MIN: CPT | Performed by: OBSTETRICS & GYNECOLOGY

## 2019-02-11 PROCEDURE — 76830 TRANSVAGINAL US NON-OB: CPT | Performed by: OBSTETRICS & GYNECOLOGY

## 2019-02-11 NOTE — PATIENT INSTRUCTIONS
Laparoscopic Tubal Ligation   WHAT YOU SHOULD KNOW:   A laparoscopic tubal ligation is surgery to close your fallopian tubes to prevent pregnancy  CARE AGREEMENT:   You have the right to help plan your care  Learn about your health condition and how it may be treated  Discuss treatment options with your caregivers to decide what care you want to receive  You always have the right to refuse treatment  RISKS:   You may bleed more than expected, have trouble breathing, or get an infection  Blood vessels or organs such as your bowel or bladder could be injured during surgery  Although pregnancy is unlikely after a tubal ligation, there is still a small chance that you may get pregnant  If pregnancy does occur, there is an increased risk for an ectopic pregnancy (tubal pregnancy)  A tubal ligation can be reversed, but it does not mean you will be able to get pregnant again  WHILE YOU ARE HERE:   Before your surgery:   · Informed consent  is a legal document that explains the tests, treatments, or procedures that you may need  Informed consent means you understand what will be done and can make decisions about what you want  You give your permission when you sign the consent form  You can have someone sign this form for you if you are not able to sign it  You have the right to understand your medical care in words you know  Before you sign the consent form, understand the risks and benefits of what will be done  Make sure all your questions are answered  · An IV  is a small tube placed in your vein that is used to give you medicine or liquids  · Anesthesia  is medicine to make you comfortable during the surgery  Caregivers will work with you to decide which anesthesia is best for you  ¨ General anesthesia  will keep you asleep and free from pain during surgery  Anesthesia may be given through your IV  You may instead breathe it in through a mask or a tube placed down your throat   The tube may cause you to have a sore throat when you wake up  ¨ Local anesthesia  is a shot of medicine put into the area where your surgery will be done  It is used to numb the area and dull the pain  You may still feel pressure or pushing during surgery  During your surgery:  One or more small incisions will be made in your abdomen  The scope and other instruments will be inserted into the incisions  Your abdomen may be filled with a gas called carbon dioxide  This makes it easier for your surgeon to see inside your abdomen  Your fallopian tubes then are closed with a type of clip or heat, or they are cut  After the surgery is done, the incisions are closed with stitches or staples  After your surgery: You will be taken to a room to rest until you are fully awake  Caregivers will monitor you closely for any problems  Do not get out of bed until your caregiver says it is okay  When your caregiver sees that you are okay, you will be able to go home or be taken to your hospital room  A bandage will cover the staples or stitches closing the incisions in your abdomen  · You will be able to drink liquids and eat certain foods once your stomach function returns  You may be given ice chips at first  Then you will get liquids such as water, broth, juice, and clear soft drinks  If your stomach does not become upset, you may then be given soft foods, such as ice cream and applesauce  Once you can eat soft foods easily, you may slowly begin to eat solid foods  · Medicines:      ¨ Antibiotics  help prevent a bacterial infection  ¨ Antinausea medicine  helps calm your stomach and prevents vomiting  ¨ Pain medicine  may be given  Do not wait until the pain is severe before you ask for more medicine  © 2014 [employer] is for End User's use only and may not be sold, redistributed or otherwise used for commercial purposes   All illustrations and images included in CareNotes® are the copyrighted property of [employer]  or Rickey Douglass  The above information is an  only  It is not intended as medical advice for individual conditions or treatments  Talk to your doctor, nurse or pharmacist before following any medical regimen to see if it is safe and effective for you    Pre-Surgery Instructions:   Medication Instructions    Biotin 1 MG CAPS per anesthesia guidelines     levonorgestrel (MIRENA) 20 MCG/24HR IUD To continue

## 2019-02-11 NOTE — PROGRESS NOTES
AMB US Pelvic Non OB  Date/Time: 2/11/2019 11:08 AM  Performed by: Eduard Navarrete  Authorized by: Bennie Brock MD     Procedure details:     Indications: non-obstetric abdominal pain      Technique:  Transvaginal US, Non-OB    Position: lithotomy exam    Uterine findings:     Diameter (mm):  41    Length (mm):  70    Width (mm):  55    Endometrial stripe: identified      Endometrium thickness (mm):  9  Left ovary findings:     Left ovary:  Visualized    Diameter (mm):  26 6    Length (mm):  50 2    Width (mm):  35 2  Right ovary findings:     Right ovary:  Visualized    Diameter (mm):  18 4    Length (mm):  33 1    Width (mm):  23 6  Other findings:     Free pelvic fluid: not identified      Free peritoneal fluid: not identified    Post-Procedure Details:     Impression:  Retroverted uterus demonstrates an IUD in good position within the endometrium  The right ovary contains a 4 7HH follicle  The left ovary demonstrates a 3 4cm simple cyst with normal appearing blood flow on color doppler of the left ovary  No free fluid  Tolerance: Tolerated well, no immediate complications    Complications: no complications    Additional Procedure Comments:      Siemens Acuson X150 EC9-4 transvaginal transducer Serial # [id number] was used to perform the examination today and subsequently followed with high level disinfection utilizing Trophon EPR procedure

## 2019-02-11 NOTE — PROGRESS NOTES
Assessment/Plan   Diagnoses and all orders for this visit:    IUD contraception    Birth control counseling    Left ovarian cyst    Pelvic pain     1  Episode of pelvic pain-patient has noted approximately dull ache to the left lower quadrant over the past 3 or 4 weeks or so  She notes increased pain with intercourse  She denies any association with eating urination or bowel movements  Ultrasound demonstrates 3 4 cm simple cyst left ovary, likely accounting for the pain  Notably, exam is unremarkable for any focal pain or masses appreciated  She will call or return with any significant pain  2  Desired sterilization-patient interested in permanent sterilization  She was counseled in detail about contraceptive options and currently has Mirena IUD  She wished to proceed with laparoscopic bilateral salpingectomy  Risks and benefits of the procedure were discussed and all questions were answered  She was consented for this procedure along with bilateral tubal fulguration  3  Left ovarian cyst-3 4 cm simple cyst noted left ovary with 1 6 cm follicle noted right ovary  Patient was counseled about these findings  Given upcoming laparoscopy, she was counseled about possible cystectomy, possible removal of tube/ovary  Given her age, ovary will not be removed unless suspicious findings are noted at laparoscopy  She understands this and would like removal if any suspicious findings are noted  She was counseled about the unlikely nature of this  4  Mirena IUD-inserted 12/26/17  We will keep the IUD in during surgery  She may consider removal in the future  5  History of severe dysplasia-status post LEEP cone biopsy 10/24/17 with severe dysplasia noted with focal extension into the endocervical glands involving the transformation zone  Follow-up Pap/HPV November 2018 demonstrated ASCUS with negative HPV  No intervention is recommended at this time    6  History of anxiety-no current issues  She will follow-up 2 weeks postprocedure  Subjective   Patient ID: Beatriz Diallo is a 34 y o  female  Vitals:    19 1059   BP: 102/62     Patient was seen today for ultrasound and office visit with me  She wishes to schedule permanent sterilization  Please see assessment plan for details        The following portions of the patient's history were reviewed and updated as appropriate: allergies, current medications, past family history, past medical history, past social history, past surgical history and problem list   Past Medical History:   Diagnosis Date    Abnormal Pap smear of cervix     Adnexal fullness     Amenorrhea     Anxiety     Arthralgia of toe     Exposure to parvovirus     GERD (gastroesophageal reflux disease)     HPV (human papilloma virus) infection     Nephrolithiasis     Umbilical hernia     Urinary tract infection      Past Surgical History:   Procedure Laterality Date    CERVICAL BIOPSY  W/ LOOP ELECTRODE EXCISION      COLPOSCOPY W/ BIOPSY / CURETTAGE      OSTEOTOMY AND ULNAR SHORTENING Right     TOOTH EXTRACTION      WRIST SURGERY       OB History    Para Term  AB Living   2 2 2 0 0 2   SAB TAB Ectopic Multiple Live Births   0 0 0 0 2      # Outcome Date GA Lbr Kennedy/2nd Weight Sex Delivery Anes PTL Lv   2 Term 17 38w2d / 00:06 3515 g (7 lb 12 oz) M Vag-Spont None N YUSUF   1 Term                Current Outpatient Medications:     Biotin 1 MG CAPS, Take by mouth, Disp: , Rfl:     levonorgestrel (MIRENA) 20 MCG/24HR IUD, 1 each by Intrauterine route once, Disp: , Rfl:     naproxen (NAPROSYN) 500 mg tablet, Take 1 tablet (500 mg total) by mouth 2 (two) times a day with meals for 10 days, Disp: 20 tablet, Rfl: 0  Allergies   Allergen Reactions    Pollen Extract      Social History     Socioeconomic History    Marital status: /Civil Union     Spouse name: None    Number of children: None    Years of education: None    Highest education level: None   Occupational History    None   Social Needs    Financial resource strain: None    Food insecurity:     Worry: None     Inability: None    Transportation needs:     Medical: None     Non-medical: None   Tobacco Use    Smoking status: Never Smoker    Smokeless tobacco: Never Used   Substance and Sexual Activity    Alcohol use: Yes     Comment: occasional    Drug use: No    Sexual activity: Yes     Partners: Male   Lifestyle    Physical activity:     Days per week: None     Minutes per session: None    Stress: None   Relationships    Social connections:     Talks on phone: None     Gets together: None     Attends Gnosticism service: None     Active member of club or organization: None     Attends meetings of clubs or organizations: None     Relationship status: None    Intimate partner violence:     Fear of current or ex partner: None     Emotionally abused: None     Physically abused: None     Forced sexual activity: None   Other Topics Concern    None   Social History Narrative    Caffeine use    Contraceptive vaginal ring    One child    Taoism affiliation- Christianity     uses safety equipment- seatbelt     Family History   Problem Relation Age of Onset    Diabetes Maternal Grandmother     Heart disease Paternal Grandfather     Hypertension Paternal Grandfather     Hyperlipidemia Paternal Grandfather     Hypertension Father     Hyperlipidemia Father     Heart disease Maternal Grandfather     Diabetes Paternal Grandmother     Diabetes Family        Review of Systems   Constitutional: Negative for chills, diaphoresis, fatigue and fever  Respiratory: Negative for apnea, cough, chest tightness, shortness of breath and wheezing  Cardiovascular: Negative for chest pain, palpitations and leg swelling  Gastrointestinal: Negative for abdominal distention, abdominal pain, anal bleeding, constipation, diarrhea, nausea, rectal pain and vomiting  Genitourinary: Positive for pelvic pain  Negative for difficulty urinating, dyspareunia, dysuria, frequency, hematuria, menstrual problem, urgency, vaginal bleeding, vaginal discharge and vaginal pain  Musculoskeletal: Negative for arthralgias, back pain and myalgias  Skin: Negative for color change and rash  Neurological: Negative for dizziness, syncope, light-headedness, numbness and headaches  Hematological: Negative for adenopathy  Does not bruise/bleed easily  Psychiatric/Behavioral: Negative for dysphoric mood and sleep disturbance  The patient is not nervous/anxious  Objective   Physical Exam    Objective      /62 (BP Location: Left arm, Patient Position: Sitting, Cuff Size: Standard)   Wt 57 6 kg (127 lb)   BMI 23 23 kg/m²     General:   alert and oriented, in no acute distress   Neck:    Breast:    Heart: regular rate and rhythm, S1, S2 normal, no murmur, click, rub or gallop   Lungs: clear to auscultation bilaterally   Abdomen: soft, non-tender, without masses or organomegaly   Vulva: normal   Vagina: Without erythema or lesions or discharge  Normal   Cervix: Without lesions or discharge or cervicitis  No Cervical motion tenderness    IUD string seen at a length of 1 5 cm   Uterus: top normal size, retroverted, non-tender   Adnexa: no mass, fullness, tenderness   Rectum: negative    Psych:  Normal mood and affect   Skin:  Without obvious lesions   Eyes: symmetric, with normal movements and reactivity   Musculoskeletal:  Normal muscle tone and movements appreciated

## 2019-03-11 ENCOUNTER — TELEPHONE (OUTPATIENT)
Dept: FAMILY MEDICINE CLINIC | Facility: CLINIC | Age: 34
End: 2019-03-11

## 2019-03-11 DIAGNOSIS — F40.243 ANXIETY WITH FLYING: Primary | ICD-10-CM

## 2019-03-11 RX ORDER — ALPRAZOLAM 0.25 MG/1
0.25 TABLET ORAL EVERY 6 HOURS PRN
Qty: 6 TABLET | Refills: 0 | Status: SHIPPED | OUTPATIENT
Start: 2019-03-11 | End: 2019-04-26

## 2019-03-11 NOTE — TELEPHONE ENCOUNTER
Pt called stating she is going to be traveling (flying) and is requesting a script for a few tablets of Xanax to [facility] in [place], Please advise if you are willing to send script

## 2019-03-26 ENCOUNTER — OFFICE VISIT (OUTPATIENT)
Dept: FAMILY MEDICINE CLINIC | Facility: CLINIC | Age: 34
End: 2019-03-26
Payer: COMMERCIAL

## 2019-03-26 VITALS
WEIGHT: 127 LBS | DIASTOLIC BLOOD PRESSURE: 60 MMHG | HEIGHT: 62 IN | BODY MASS INDEX: 23.37 KG/M2 | TEMPERATURE: 97.9 F | HEART RATE: 84 BPM | RESPIRATION RATE: 18 BRPM | SYSTOLIC BLOOD PRESSURE: 110 MMHG | OXYGEN SATURATION: 99 %

## 2019-03-26 DIAGNOSIS — J40 BRONCHITIS: ICD-10-CM

## 2019-03-26 DIAGNOSIS — J02.9 PHARYNGITIS, UNSPECIFIED ETIOLOGY: Primary | ICD-10-CM

## 2019-03-26 LAB — S PYO AG THROAT QL: NEGATIVE

## 2019-03-26 PROCEDURE — 87880 STREP A ASSAY W/OPTIC: CPT | Performed by: NURSE PRACTITIONER

## 2019-03-26 PROCEDURE — 99213 OFFICE O/P EST LOW 20 MIN: CPT | Performed by: NURSE PRACTITIONER

## 2019-03-26 PROCEDURE — 3008F BODY MASS INDEX DOCD: CPT | Performed by: NURSE PRACTITIONER

## 2019-03-26 RX ORDER — AZITHROMYCIN 250 MG/1
TABLET, FILM COATED ORAL
Qty: 6 TABLET | Refills: 0 | Status: SHIPPED | OUTPATIENT
Start: 2019-03-26 | End: 2019-03-30

## 2019-03-26 NOTE — PROGRESS NOTES
Assessment/Plan:    No problem-specific Assessment & Plan notes found for this encounter  Diagnoses and all orders for this visit:    Pharyngitis, unspecified etiology  -     POCT rapid strepA negative  Bronchitis    Z bladimir,  Get plenty of rest   Drink lots of fluids  Gargle with salt water (1/4-1/2 tsp  salt in 8 oz warm water) several times daily  Follow up if worsening or if not better in 2-3 days  Patient ID: Joey Hannon is a 34 y o  female  5-6 days ago pt developed headache, post nasal drip, and sorethroat from the post nasal drip  Now throat very sore and head hurts runny nose and coughing up yellow/green thick mucus  Nasal secretions are clear  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  Recently returned from vacation to [place]  The following portions of the patient's history were reviewed and updated as appropriate: allergies, current medications, past family history, past medical history, past social history, past surgical history and problem list     Review of Systems   Constitutional: Negative for activity change, appetite change, chills and fatigue  HENT: Positive for postnasal drip, sore throat and trouble swallowing  Negative for congestion, sinus pressure and sinus pain  Respiratory: Positive for cough  Negative for shortness of breath and wheezing  Cardiovascular: Negative for chest pain  Gastrointestinal: Negative for diarrhea, nausea and vomiting  Musculoskeletal: Negative for arthralgias and myalgias  Objective:      /60 (BP Location: Left arm, Patient Position: Sitting, Cuff Size: Adult)   Pulse 84   Temp 97 9 °F (36 6 °C) (Oral)   Resp 18   Ht 5' 2" (1 575 m)   Wt 57 6 kg (127 lb)   SpO2 99% Comment: Room Air  BMI 23 23 kg/m²          Physical Exam   Constitutional: She is oriented to person, place, and time  She appears well-developed and well-nourished  HENT:   Head: Normocephalic     Right Ear: Hearing, tympanic membrane and ear canal normal    Left Ear: Tympanic membrane and ear canal normal    Mouth/Throat: Mucous membranes are normal  Posterior oropharyngeal erythema present  No oropharyngeal exudate or posterior oropharyngeal edema  Tonsils are 0 on the right  Tonsils are 0 on the left  No tonsillar exudate  Eyes: EOM are normal    Neck: Normal range of motion  Neck supple  Cardiovascular: Normal rate and normal heart sounds  Pulmonary/Chest: Effort normal and breath sounds normal  No respiratory distress  She has no wheezes  She has no rhonchi  She has no rales  Abdominal: Soft  Bowel sounds are normal    Lymphadenopathy:     She has no cervical adenopathy  Neurological: She is alert and oriented to person, place, and time  Skin: Skin is warm and dry

## 2019-04-08 ENCOUNTER — APPOINTMENT (OUTPATIENT)
Dept: LAB | Facility: HOSPITAL | Age: 34
End: 2019-04-08
Attending: OBSTETRICS & GYNECOLOGY
Payer: COMMERCIAL

## 2019-04-08 DIAGNOSIS — Z01.818 PRE-OP TESTING: ICD-10-CM

## 2019-04-08 PROCEDURE — 86900 BLOOD TYPING SEROLOGIC ABO: CPT

## 2019-04-08 PROCEDURE — 86901 BLOOD TYPING SEROLOGIC RH(D): CPT

## 2019-04-08 PROCEDURE — 36415 COLL VENOUS BLD VENIPUNCTURE: CPT

## 2019-04-08 PROCEDURE — 86850 RBC ANTIBODY SCREEN: CPT

## 2019-04-13 LAB
ABO GROUP BLD: NORMAL
BLD GP AB SCN SERPL QL: NEGATIVE
RH BLD: POSITIVE
SPECIMEN EXPIRATION DATE: NORMAL

## 2019-04-15 ENCOUNTER — ANESTHESIA EVENT (OUTPATIENT)
Dept: PERIOP | Facility: HOSPITAL | Age: 34
End: 2019-04-15
Payer: COMMERCIAL

## 2019-04-15 PROCEDURE — NC001 PR NO CHARGE: Performed by: OBSTETRICS & GYNECOLOGY

## 2019-04-16 ENCOUNTER — HOSPITAL ENCOUNTER (OUTPATIENT)
Facility: HOSPITAL | Age: 34
Setting detail: OUTPATIENT SURGERY
Discharge: HOME | End: 2019-04-16
Attending: OBSTETRICS & GYNECOLOGY | Admitting: OBSTETRICS & GYNECOLOGY
Payer: COMMERCIAL

## 2019-04-16 ENCOUNTER — ANESTHESIA (OUTPATIENT)
Dept: PERIOP | Facility: HOSPITAL | Age: 34
End: 2019-04-16
Payer: COMMERCIAL

## 2019-04-16 VITALS
WEIGHT: 120 LBS | HEIGHT: 62 IN | RESPIRATION RATE: 18 BRPM | DIASTOLIC BLOOD PRESSURE: 63 MMHG | HEART RATE: 84 BPM | TEMPERATURE: 98.1 F | SYSTOLIC BLOOD PRESSURE: 110 MMHG | OXYGEN SATURATION: 99 % | BODY MASS INDEX: 22.08 KG/M2

## 2019-04-16 DIAGNOSIS — N83.209 CYST OF OVARY, UNSPECIFIED LATERALITY: ICD-10-CM

## 2019-04-16 DIAGNOSIS — G89.18 POST-OPERATIVE PAIN: Primary | ICD-10-CM

## 2019-04-16 DIAGNOSIS — Z30.9 ENCOUNTER FOR CONTRACEPTIVE MANAGEMENT, UNSPECIFIED TYPE: ICD-10-CM

## 2019-04-16 LAB — EXT PREGNANCY TEST URINE: NEGATIVE

## 2019-04-16 PROCEDURE — 88302 TISSUE EXAM BY PATHOLOGIST: CPT | Performed by: PATHOLOGY

## 2019-04-16 PROCEDURE — 88312 SPECIAL STAINS GROUP 1: CPT | Performed by: PATHOLOGY

## 2019-04-16 PROCEDURE — 58662 LAPAROSCOPY EXCISE LESIONS: CPT | Performed by: OBSTETRICS & GYNECOLOGY

## 2019-04-16 PROCEDURE — 88342 IMHCHEM/IMCYTCHM 1ST ANTB: CPT | Performed by: PATHOLOGY

## 2019-04-16 PROCEDURE — 88341 IMHCHEM/IMCYTCHM EA ADD ANTB: CPT | Performed by: PATHOLOGY

## 2019-04-16 PROCEDURE — 88305 TISSUE EXAM BY PATHOLOGIST: CPT | Performed by: PATHOLOGY

## 2019-04-16 PROCEDURE — 81025 URINE PREGNANCY TEST: CPT | Performed by: ANESTHESIOLOGY

## 2019-04-16 PROCEDURE — 58661 LAPAROSCOPY REMOVE ADNEXA: CPT | Performed by: OBSTETRICS & GYNECOLOGY

## 2019-04-16 RX ORDER — GLYCOPYRROLATE 0.2 MG/ML
INJECTION INTRAMUSCULAR; INTRAVENOUS AS NEEDED
Status: DISCONTINUED | OUTPATIENT
Start: 2019-04-16 | End: 2019-04-16 | Stop reason: SURG

## 2019-04-16 RX ORDER — BUPIVACAINE HYDROCHLORIDE 5 MG/ML
INJECTION, SOLUTION EPIDURAL; INTRACAUDAL AS NEEDED
Status: DISCONTINUED | OUTPATIENT
Start: 2019-04-16 | End: 2019-04-16 | Stop reason: HOSPADM

## 2019-04-16 RX ORDER — DEXAMETHASONE SODIUM PHOSPHATE 4 MG/ML
INJECTION, SOLUTION INTRA-ARTICULAR; INTRALESIONAL; INTRAMUSCULAR; INTRAVENOUS; SOFT TISSUE AS NEEDED
Status: DISCONTINUED | OUTPATIENT
Start: 2019-04-16 | End: 2019-04-16 | Stop reason: SURG

## 2019-04-16 RX ORDER — NEOSTIGMINE METHYLSULFATE 1 MG/ML
INJECTION INTRAVENOUS AS NEEDED
Status: DISCONTINUED | OUTPATIENT
Start: 2019-04-16 | End: 2019-04-16 | Stop reason: SURG

## 2019-04-16 RX ORDER — OXYCODONE HYDROCHLORIDE 5 MG/1
5 TABLET ORAL EVERY 4 HOURS PRN
Status: DISCONTINUED | OUTPATIENT
Start: 2019-04-16 | End: 2019-04-16 | Stop reason: HOSPADM

## 2019-04-16 RX ORDER — KETOROLAC TROMETHAMINE 30 MG/ML
INJECTION, SOLUTION INTRAMUSCULAR; INTRAVENOUS AS NEEDED
Status: DISCONTINUED | OUTPATIENT
Start: 2019-04-16 | End: 2019-04-16 | Stop reason: SURG

## 2019-04-16 RX ORDER — ONDANSETRON 2 MG/ML
4 INJECTION INTRAMUSCULAR; INTRAVENOUS ONCE AS NEEDED
Status: DISCONTINUED | OUTPATIENT
Start: 2019-04-16 | End: 2019-04-16 | Stop reason: HOSPADM

## 2019-04-16 RX ORDER — IBUPROFEN 600 MG/1
600 TABLET ORAL EVERY 6 HOURS PRN
Qty: 30 TABLET | Refills: 0 | Status: SHIPPED | OUTPATIENT
Start: 2019-04-16 | End: 2019-11-18

## 2019-04-16 RX ORDER — OXYCODONE HYDROCHLORIDE 5 MG/1
5 TABLET ORAL EVERY 4 HOURS PRN
Qty: 10 TABLET | Refills: 0 | Status: SHIPPED | OUTPATIENT
Start: 2019-04-16 | End: 2019-04-26

## 2019-04-16 RX ORDER — OXYCODONE HYDROCHLORIDE 5 MG/1
10 TABLET ORAL EVERY 4 HOURS PRN
Status: DISCONTINUED | OUTPATIENT
Start: 2019-04-16 | End: 2019-04-16 | Stop reason: HOSPADM

## 2019-04-16 RX ORDER — ONDANSETRON 2 MG/ML
4 INJECTION INTRAMUSCULAR; INTRAVENOUS EVERY 6 HOURS PRN
Status: DISCONTINUED | OUTPATIENT
Start: 2019-04-16 | End: 2019-04-16 | Stop reason: HOSPADM

## 2019-04-16 RX ORDER — MIDAZOLAM HYDROCHLORIDE 1 MG/ML
INJECTION INTRAMUSCULAR; INTRAVENOUS AS NEEDED
Status: DISCONTINUED | OUTPATIENT
Start: 2019-04-16 | End: 2019-04-16 | Stop reason: SURG

## 2019-04-16 RX ORDER — FENTANYL CITRATE/PF 50 MCG/ML
25 SYRINGE (ML) INJECTION
Status: DISCONTINUED | OUTPATIENT
Start: 2019-04-16 | End: 2019-04-16 | Stop reason: HOSPADM

## 2019-04-16 RX ORDER — HYDROMORPHONE HCL/PF 1 MG/ML
0.5 SYRINGE (ML) INJECTION
Status: DISCONTINUED | OUTPATIENT
Start: 2019-04-16 | End: 2019-04-16 | Stop reason: HOSPADM

## 2019-04-16 RX ORDER — ONDANSETRON 2 MG/ML
INJECTION INTRAMUSCULAR; INTRAVENOUS AS NEEDED
Status: DISCONTINUED | OUTPATIENT
Start: 2019-04-16 | End: 2019-04-16 | Stop reason: SURG

## 2019-04-16 RX ORDER — PROPOFOL 10 MG/ML
INJECTION, EMULSION INTRAVENOUS AS NEEDED
Status: DISCONTINUED | OUTPATIENT
Start: 2019-04-16 | End: 2019-04-16 | Stop reason: SURG

## 2019-04-16 RX ORDER — ROCURONIUM BROMIDE 10 MG/ML
INJECTION, SOLUTION INTRAVENOUS AS NEEDED
Status: DISCONTINUED | OUTPATIENT
Start: 2019-04-16 | End: 2019-04-16 | Stop reason: SURG

## 2019-04-16 RX ORDER — ACETAMINOPHEN 500 MG
500 TABLET ORAL EVERY 6 HOURS PRN
Qty: 30 TABLET | Refills: 0 | Status: SHIPPED | OUTPATIENT
Start: 2019-04-16 | End: 2019-11-18

## 2019-04-16 RX ORDER — SODIUM CHLORIDE 9 MG/ML
125 INJECTION, SOLUTION INTRAVENOUS CONTINUOUS
Status: DISCONTINUED | OUTPATIENT
Start: 2019-04-16 | End: 2019-04-16 | Stop reason: HOSPADM

## 2019-04-16 RX ORDER — IBUPROFEN 600 MG/1
600 TABLET ORAL EVERY 6 HOURS PRN
Status: DISCONTINUED | OUTPATIENT
Start: 2019-04-16 | End: 2019-04-16 | Stop reason: HOSPADM

## 2019-04-16 RX ORDER — FENTANYL CITRATE 50 UG/ML
INJECTION, SOLUTION INTRAMUSCULAR; INTRAVENOUS AS NEEDED
Status: DISCONTINUED | OUTPATIENT
Start: 2019-04-16 | End: 2019-04-16 | Stop reason: SURG

## 2019-04-16 RX ADMIN — ONDANSETRON 4 MG: 2 INJECTION INTRAMUSCULAR; INTRAVENOUS at 09:15

## 2019-04-16 RX ADMIN — FENTANYL CITRATE 25 MCG: 50 INJECTION, SOLUTION INTRAMUSCULAR; INTRAVENOUS at 10:32

## 2019-04-16 RX ADMIN — NEOSTIGMINE METHYLSULFATE 2.5 MG: 1 INJECTION, SOLUTION INTRAVENOUS at 10:03

## 2019-04-16 RX ADMIN — SODIUM CHLORIDE: 0.9 INJECTION, SOLUTION INTRAVENOUS at 09:32

## 2019-04-16 RX ADMIN — FENTANYL CITRATE 50 MCG: 50 INJECTION, SOLUTION INTRAMUSCULAR; INTRAVENOUS at 09:11

## 2019-04-16 RX ADMIN — DEXAMETHASONE SODIUM PHOSPHATE 4 MG: 4 INJECTION, SOLUTION INTRAMUSCULAR; INTRAVENOUS at 09:15

## 2019-04-16 RX ADMIN — PROPOFOL 200 MG: 10 INJECTION, EMULSION INTRAVENOUS at 08:53

## 2019-04-16 RX ADMIN — KETOROLAC TROMETHAMINE 30 MG: 30 INJECTION, SOLUTION INTRAMUSCULAR at 10:05

## 2019-04-16 RX ADMIN — FENTANYL CITRATE 100 MCG: 50 INJECTION, SOLUTION INTRAMUSCULAR; INTRAVENOUS at 08:53

## 2019-04-16 RX ADMIN — LIDOCAINE HYDROCHLORIDE 60 MG: 20 INJECTION, SOLUTION INTRAVENOUS at 08:53

## 2019-04-16 RX ADMIN — FENTANYL CITRATE 50 MCG: 50 INJECTION, SOLUTION INTRAMUSCULAR; INTRAVENOUS at 09:55

## 2019-04-16 RX ADMIN — ROCURONIUM BROMIDE 40 MG: 10 INJECTION, SOLUTION INTRAVENOUS at 08:53

## 2019-04-16 RX ADMIN — MIDAZOLAM 2 MG: 1 INJECTION INTRAMUSCULAR; INTRAVENOUS at 08:47

## 2019-04-16 RX ADMIN — SODIUM CHLORIDE 125 ML/HR: 0.9 INJECTION, SOLUTION INTRAVENOUS at 07:52

## 2019-04-16 RX ADMIN — GLYCOPYRROLATE 0.4 MG: 0.2 INJECTION INTRAMUSCULAR; INTRAVENOUS at 10:03

## 2019-04-19 ENCOUNTER — DOCUMENTATION (OUTPATIENT)
Dept: OBGYN CLINIC | Facility: CLINIC | Age: 34
End: 2019-04-19

## 2019-04-26 ENCOUNTER — HOSPITAL ENCOUNTER (EMERGENCY)
Facility: HOSPITAL | Age: 34
Discharge: HOME | End: 2019-04-26
Attending: EMERGENCY MEDICINE | Admitting: EMERGENCY MEDICINE
Payer: COMMERCIAL

## 2019-04-26 ENCOUNTER — APPOINTMENT (EMERGENCY)
Dept: NON INVASIVE DIAGNOSTICS | Facility: HOSPITAL | Age: 34
End: 2019-04-26
Payer: COMMERCIAL

## 2019-04-26 VITALS
DIASTOLIC BLOOD PRESSURE: 87 MMHG | HEART RATE: 82 BPM | RESPIRATION RATE: 18 BRPM | WEIGHT: 125.66 LBS | BODY MASS INDEX: 22.98 KG/M2 | TEMPERATURE: 97.9 F | OXYGEN SATURATION: 100 % | SYSTOLIC BLOOD PRESSURE: 153 MMHG

## 2019-04-26 DIAGNOSIS — M79.604 RIGHT LEG PAIN: Primary | ICD-10-CM

## 2019-04-26 DIAGNOSIS — Z51.89 VISIT FOR WOUND CHECK: ICD-10-CM

## 2019-04-26 PROCEDURE — 99282 EMERGENCY DEPT VISIT SF MDM: CPT | Performed by: EMERGENCY MEDICINE

## 2019-04-26 PROCEDURE — 99283 EMERGENCY DEPT VISIT LOW MDM: CPT

## 2019-04-26 PROCEDURE — 93971 EXTREMITY STUDY: CPT | Performed by: SURGERY

## 2019-04-26 PROCEDURE — 93971 EXTREMITY STUDY: CPT

## 2019-04-30 ENCOUNTER — OFFICE VISIT (OUTPATIENT)
Dept: OBGYN CLINIC | Facility: CLINIC | Age: 34
End: 2019-04-30
Payer: COMMERCIAL

## 2019-04-30 VITALS — SYSTOLIC BLOOD PRESSURE: 108 MMHG | DIASTOLIC BLOOD PRESSURE: 70 MMHG

## 2019-04-30 DIAGNOSIS — N91.4 SECONDARY OLIGOMENORRHEA: ICD-10-CM

## 2019-04-30 DIAGNOSIS — Z97.5 IUD CONTRACEPTION: ICD-10-CM

## 2019-04-30 DIAGNOSIS — N83.202 LEFT OVARIAN CYST: Primary | ICD-10-CM

## 2019-04-30 PROCEDURE — 99213 OFFICE O/P EST LOW 20 MIN: CPT | Performed by: OBSTETRICS & GYNECOLOGY

## 2019-11-11 ENCOUNTER — ANNUAL EXAM (OUTPATIENT)
Dept: OBGYN CLINIC | Facility: CLINIC | Age: 34
End: 2019-11-11
Payer: COMMERCIAL

## 2019-11-11 VITALS
WEIGHT: 135 LBS | SYSTOLIC BLOOD PRESSURE: 120 MMHG | HEIGHT: 62 IN | BODY MASS INDEX: 24.84 KG/M2 | DIASTOLIC BLOOD PRESSURE: 76 MMHG

## 2019-11-11 DIAGNOSIS — A63.0 CONDYLOMA: ICD-10-CM

## 2019-11-11 DIAGNOSIS — R10.2 PELVIC PAIN: ICD-10-CM

## 2019-11-11 DIAGNOSIS — Z11.51 SCREENING FOR HPV (HUMAN PAPILLOMAVIRUS): ICD-10-CM

## 2019-11-11 DIAGNOSIS — Z12.4 SCREENING FOR CERVICAL CANCER: Primary | ICD-10-CM

## 2019-11-11 DIAGNOSIS — Z97.5 IUD CONTRACEPTION: ICD-10-CM

## 2019-11-11 DIAGNOSIS — Z01.419 WOMEN'S ANNUAL ROUTINE GYNECOLOGICAL EXAMINATION: ICD-10-CM

## 2019-11-11 PROCEDURE — 99395 PREV VISIT EST AGE 18-39: CPT | Performed by: OBSTETRICS & GYNECOLOGY

## 2019-11-11 PROCEDURE — G0145 SCR C/V CYTO,THINLAYER,RESCR: HCPCS | Performed by: OBSTETRICS & GYNECOLOGY

## 2019-11-11 PROCEDURE — 87624 HPV HI-RISK TYP POOLED RSLT: CPT | Performed by: OBSTETRICS & GYNECOLOGY

## 2019-11-11 NOTE — PROGRESS NOTES
Assessment/Plan   Diagnoses and all orders for this visit:    Screening for cervical cancer  -     Liquid-based pap, screening    Screening for HPV (human papillomavirus)  -     Liquid-based pap, screening    IUD contraception    Pelvic pain    Women's annual routine gynecological examination    Condyloma     1  Yearly exam-Pap smear done with HPV testing, self-breast awareness reviewed  2  Left adnexal tenderness-noted on exam today  Patient has been noting intermittent left-sided discomfort  She denies any dysmenorrhea and only notes rare dyspareunia  Given the history of ovarian cyst, she would like to be aggressive to follow this  She will return in the near future for ultrasound and office visit with me  3  Mirena IUD-in good position on exam, IUD string approximately 2 cm  We will confirm positioning on upcoming ultrasound  IUD was inserted 12/26/17  4  Pelvic discomfort-as noted above  She will Return for ultrasound  5  Possible endometriosis-noted with biopsy at laparoscopic  This may also be accounting for the pelvic discomfort  She will continue Mirena IUD for hormonal treatment  Could consider anti-inflammatory medications as well  We will assess ultrasound as noted above  6  Status post bilateral salpingectomy for contraception  7  Prior history of severe dysplasia-status post LEEP cone biopsy 10/24/17 with severe dysplasia with focal extension noted into the endocervical glands involving the transformation zone  Pap November 2018 with ASCUS negative HPV  Pap with HPV was done today with disposition as per findings  8  Possible condyloma-noted approximately 3 cm below the rectum with 1 area that appears to be condyloma and a 2nd 1 about 1 cm below that possible condyloma versus inclusion cyst   She was counseled about excision versus treatment  She will return and we will proceed with TCA treatment with disposition as per results      Subjective   Patient ID: Cari Maravilla is a 30 y o  female  Vitals:    19 1602   BP: 120/76     Patient was seen today for yearly exam   Please see assessment plan for details        The following portions of the patient's history were reviewed and updated as appropriate: allergies, current medications, past family history, past medical history, past social history, past surgical history and problem list   Past Medical History:   Diagnosis Date    Abnormal Pap smear of cervix     Adnexal fullness     Amenorrhea     Anxiety     Arthralgia of toe     Exposure to parvovirus     GERD (gastroesophageal reflux disease)     during pregnancy    HPV (human papilloma virus) infection     Nephrolithiasis     Umbilical hernia     Urinary tract infection      Past Surgical History:   Procedure Laterality Date    CERVICAL BIOPSY  W/ LOOP ELECTRODE EXCISION      COLPOSCOPY W/ BIOPSY / CURETTAGE      OSTEOTOMY AND ULNAR SHORTENING Right     MO LAP,DIAGNOSTIC ABDOMEN Bilateral 2019    Procedure: DIAGNOSTIC LAP; BILATERAL SALPINGECTOMY; OVARIAN CYSTECTOMY, ECSISION PELVIC CYST;  Surgeon: Davonte Rosario MD;  Location: [facility];  Service: Gynecology    TOOTH EXTRACTION      [employer] teeth    WRIST SURGERY       OB History    Para Term  AB Living   2 2 2 0 0 2   SAB TAB Ectopic Multiple Live Births   0 0 0 0 2      # Outcome Date GA Lbr Kennedy/2nd Weight Sex Delivery Anes PTL Lv   2 Term 17 38w2d / 00:06 3515 g (7 lb 12 oz) M Vag-Spont None N YUSUF   1 Term                Current Outpatient Medications:     levonorgestrel (MIRENA) 20 MCG/24HR IUD, 1 each by Intrauterine route once , Disp: , Rfl:     acetaminophen (TYLENOL) 500 mg tablet, Take 1 tablet (500 mg total) by mouth every 6 (six) hours as needed for mild pain (Patient not taking: Reported on 2019), Disp: 30 tablet, Rfl: 0    ibuprofen (MOTRIN) 600 mg tablet, Take 1 tablet (600 mg total) by mouth every 6 (six) hours as needed for moderate pain (Patient not taking: Reported on 11/11/2019), Disp: 30 tablet, Rfl: 0  Allergies   Allergen Reactions    Pollen Extract     Nickel Rash     Some earrings will get rash and very sore     Social History     Socioeconomic History    Marital status: /Civil Union     Spouse name: None    Number of children: None    Years of education: None    Highest education level: None   Occupational History    None   Social Needs    Financial resource strain: None    Food insecurity:     Worry: None     Inability: None    Transportation needs:     Medical: None     Non-medical: None   Tobacco Use    Smoking status: Never Smoker    Smokeless tobacco: Never Used   Substance and Sexual Activity    Alcohol use: Yes     Drinks per session: 1 or 2     Binge frequency: Less than monthly     Comment: occasional    Drug use: No    Sexual activity: Yes     Partners: Male   Lifestyle    Physical activity:     Days per week: None     Minutes per session: None    Stress: None   Relationships    Social connections:     Talks on phone: None     Gets together: None     Attends Jain service: None     Active member of club or organization: None     Attends meetings of clubs or organizations: None     Relationship status: None    Intimate partner violence:     Fear of current or ex partner: None     Emotionally abused: None     Physically abused: None     Forced sexual activity: None   Other Topics Concern    None   Social History Narrative    Caffeine use    Contraceptive vaginal ring    One child    Buddhist affiliation- Confucianist     uses safety equipment- seatbelt     Family History   Problem Relation Age of Onset    Diabetes Maternal Grandmother     Heart disease Paternal Grandfather     Hypertension Paternal Grandfather     Hyperlipidemia Paternal Grandfather     Hypertension Father     Hyperlipidemia Father     Heart disease Maternal Grandfather     Diabetes Paternal Grandmother     Diabetes Family        Review of Systems Constitutional: Negative for chills, diaphoresis, fatigue and fever  Respiratory: Negative for apnea, cough, chest tightness, shortness of breath and wheezing  Cardiovascular: Negative for chest pain, palpitations and leg swelling  Gastrointestinal: Negative for abdominal distention, abdominal pain, anal bleeding, constipation, diarrhea, nausea, rectal pain and vomiting  Genitourinary: Negative for difficulty urinating, dyspareunia, dysuria, frequency, hematuria, menstrual problem, pelvic pain, urgency, vaginal bleeding, vaginal discharge and vaginal pain  Musculoskeletal: Negative for arthralgias, back pain and myalgias  Skin: Negative for color change and rash  Neurological: Negative for dizziness, syncope, light-headedness, numbness and headaches  Hematological: Negative for adenopathy  Does not bruise/bleed easily  Psychiatric/Behavioral: Negative for dysphoric mood and sleep disturbance  The patient is not nervous/anxious  Objective   Physical Exam    Objective      /76 (BP Location: Left arm, Patient Position: Sitting, Cuff Size: Large)   Ht 5' 2" (1 575 m)   Wt 61 2 kg (135 lb)   LMP 11/06/2019 (Exact Date)   BMI 24 69 kg/m²     General:   alert and oriented, in no acute distress   Neck: normal to inspection and palpation   Breast: normal appearance, no masses or tenderness   Heart:    Lungs:    Abdomen: soft, non-tender, without masses or organomegaly   Vulva: normal   Vagina: Without erythema or lesions or discharge  Normal   Cervix: Without lesions or discharge or cervicitis  No Cervical motion tenderness  IUD string seen at a length of 2 cm  Uterus: top normal size, anteverted, non-tender   Adnexa: Left adnexa mildly tender without mass appreciated  Right was negative   Rectum: negative    Psych:  Normal mood and affect   Skin:  Without obvious lesions  Probable 0 5 cm condyloma noted approximately 3 cm inferior to the rectum    Just below this, raised condyloma verses inclusion cyst noted, less than 0 25 cm   Eyes: symmetric, with normal movements and reactivity   Musculoskeletal:  Normal muscle tone and movements appreciated

## 2019-11-11 NOTE — PATIENT INSTRUCTIONS
Endometriosis   WHAT YOU NEED TO KNOW:   What is endometriosis? Endometriosis is a condition in which tissue that is normally only in your uterus grows outside of the uterus  Endometriosis causes tissue that should be shed during a monthly period to grow on your ovaries, fallopian tubes, bladder, or other organs  Organs and tissue may stick together and cause inflammation and pain  What increases my risk for endometriosis? The cause of endometriosis may not be known  Any of the following may increase your risk:  · Monthly period that started early    · Older than 35 when you had your first child    · Menopause after age 54    · Born with a narrow cervix or vagina, or no opening of your cervix or vagina    · Family history of endometriosis    · A weak immune system  What are the signs and symptoms of endometriosis? · Abdominal pain or nausea and vomiting before or during your period    · Painful periods     · Feeling full or bloated    · Dizziness or fatigue    · Heavy periods, or vaginal bleeding at times other than during your monthly period    · Infertility (being unable to get pregnant)    · Lower back pain or painful bowel movements during your monthly periods    · Pain during or after sex    · Pain when you urinate  How is endometriosis diagnosed? Your healthcare provider will examine you and ask you questions about other medical conditions you may have  He may ask about your menstrual history, pregnancies, and if you have a family member with endometriosis  You may also have one or more of the following tests:  · A blood test  can check for pregnancy, sexually transmitted infection, and anemia from blood loss  · A pelvic exam  may be needed to check the size and shape of your uterus, cervix, and ovaries  This may also help to find areas of endometriosis  · A vaginal ultrasound  uses sound waves to show pictures of your uterus and ovaries on a monitor   An ultrasound may be done to show endometriosis  · A CT or MRI  may show the endometriosis  You may be given contrast liquid to help your abdomen show up better in the pictures  Tell the healthcare provider if you have ever had an allergic reaction to contrast liquid  Do not enter the MRI room with anything metal  Metal can cause serious injury  Tell the healthcare provider if you have any metal in or on your body  · Laparoscopy  is a procedure used to look inside your abdomen  A piece of tissue may be removed from your ovaries, fallopian tubes, bowels, or other organs  The tissues are sent to a lab for tests to see if endometriosis is present  How is endometriosis treated? · Medicines:      ¨ Hormones  may help shrink endometrial tissue and decrease pain and inflammation  You may be given birth control pills, androgen hormones, or medicine that makes your body produce less of certain hormones  ¨ Acetaminophen  decreases pain and is available without a doctor's order  Ask how much to take and how often to take it  Follow directions  Acetaminophen can cause liver damage if not taken correctly  ¨ NSAIDs , such as ibuprofen, help decrease swelling, pain, and fever  This medicine is available with or without a doctor's order  NSAIDs can cause stomach bleeding or kidney problems in certain people  If you take blood thinner medicine, always ask your healthcare provider if NSAIDs are safe for you  Always read the medicine label and follow directions  · Surgery  may be needed to determine if you have endometriosis  Endometrial tissue that is growing in the wrong places may be removed  How can I manage my symptoms? · Apply heat  on your abdomen for 20 to 30 minutes every 2 hours for as many days as directed  Heat helps decrease pain and muscle spasms  · Exercise regularly  to help reduce symptoms, such as pain  Ask about the best exercise plan for you  Where can I find more information?    · [employer] [facility]  [address] [place] [address]  Phone: [phone]  Phone: [phone]  Web Address: [email or web address]  When should I seek immediate care? · You have severe pain that does not go away after you take pain medicine  When should I contact my healthcare provider? · Your symptoms return after treatment  · You have heavy or unusual vaginal bleeding  · You see blood in your urine or bowel movement  · You have questions or concerns about your condition or care  CARE AGREEMENT:   You have the right to help plan your care  Learn about your health condition and how it may be treated  Discuss treatment options with your caregivers to decide what care you want to receive  You always have the right to refuse treatment  The above information is an  only  It is not intended as medical advice for individual conditions or treatments  Talk to your doctor, nurse or pharmacist before following any medical regimen to see if it is safe and effective for you  © 2017 [employer]  Information is for End User's use only and may not be sold, redistributed or otherwise used for commercial purposes  All illustrations and images included in CareNotes® are the copyrighted property of [employer]  or [employer]key Douglass  Genital Warts   WHAT YOU NEED TO KNOW:   What are genital warts? Genital warts are a sexually transmitted infection (STI) caused by the human papillomavirus (HPV)  Genital warts are growths that appear in or on the penis, vagina, or anus  Genital warts are spread during genital, anal, or oral sex  A woman can also pass them to a baby when she gives birth  What increases my risk for genital warts? · Not wearing protection during sex, such as a condom    · Multiple sex partners    · A weak immune system caused by other STIs, HIV, or cancer    · Smoking  What are the signs and symptoms of genital warts?   Genital warts are flat or dome shaped and can be pink, red, or brown  As the warts grow, your skin may itch or burn  Warts can be painful if they grow together  Over time the warts may look like cauliflower  They may feel moist and rough when you touch them  How are genital warts diagnosed? Your healthcare provider will examine you and ask about your sexual activity  He will use a light to look at your penis, vagina, or anus  You may need any of the following tests:  · An acetic acid test  is when your healthcare provider puts a solution on the affected area  The solution turns the warts white  · A sample of one or more warts  may be removed and sent for tests  The tests may show if you have a higher risk of certain types of cancer, such as cervical cancer  · A Pap smear  may show if you have HPV or other cervical problems  A sample of cells from your cervix are collected and sent for testing  How are genital warts treated? Small genital warts may heal without treatment  In some cases, the warts can get bigger, or you may get more of them  Treatment can help prevent you from spreading warts to others and may help prevent cervical cancer in women  Treatment can also take away your symptoms and help you feel better  You may need any of the following:  · Medicines:      ¨ Immunomodulators  help strengthen your immune system and treat genital warts  ¨ Antiproliferatives  help stop genital warts from growing in size or increasing in number  ¨ Antivirals  help control and stop virus growth, such as HPV  · Procedures:      ¨ Cryotherapy  is used to freeze and destroy genital warts with liquid nitrogen  ¨ Electrocautery  is used to destroy genital warts with heat  ¨ A laser  may be used to remove larger or thicker genital warts  The laser uses heat to destroy the tissues around or near your warts  This treatment may help the areas heal without leaving scars       ¨ Surgery  with a scalpel, scissors, or other surgical tools may be used to remove the warts  How can I manage my symptoms? · Do not touch or scratch the warts  This can cause the infection to spread to other parts of your body  · Do not have sex while you are being treated for genital warts  Medicine used on your skin weakens condoms and diaphragms  You also risk spreading genital warts to your partner  · Get regular Pap smears  If you are a woman, this can help diagnose HPV and prevent the spread of the virus  How can I help prevent the spread of genital warts? · Tell your sexual partners that you are being treated for genital warts  They may also be infected and need treatment  · Get the HPV vaccine  The HPV vaccine is given at 5to 32years of age to help prevent cervical cancer and genital warts  Ask your healthcare provider for more information about this vaccine  When should I contact my healthcare provider? · Your genital warts return  · The skin that is being treated for genital warts is very painful or swollen  · You see or feel new warts on another part of your body  · You have questions or concerns about your condition or care  CARE AGREEMENT:   You have the right to help plan your care  Learn about your health condition and how it may be treated  Discuss treatment options with your caregivers to decide what care you want to receive  You always have the right to refuse treatment  The above information is an  only  It is not intended as medical advice for individual conditions or treatments  Talk to your doctor, nurse or pharmacist before following any medical regimen to see if it is safe and effective for you  © 2017 [employer] Information is for End User's use only and may not be sold, redistributed or otherwise used for commercial purposes  All illustrations and images included in CareNotes® are the copyrighted property of [employer]  or [employer]key [employer]

## 2019-11-15 LAB
LAB AP GYN PRIMARY INTERPRETATION: NORMAL
Lab: NORMAL

## 2019-11-18 ENCOUNTER — OFFICE VISIT (OUTPATIENT)
Dept: FAMILY MEDICINE CLINIC | Facility: CLINIC | Age: 34
End: 2019-11-18
Payer: COMMERCIAL

## 2019-11-18 VITALS
HEART RATE: 76 BPM | BODY MASS INDEX: 24.48 KG/M2 | DIASTOLIC BLOOD PRESSURE: 64 MMHG | HEIGHT: 62 IN | WEIGHT: 133 LBS | SYSTOLIC BLOOD PRESSURE: 104 MMHG | TEMPERATURE: 97 F | RESPIRATION RATE: 16 BRPM

## 2019-11-18 DIAGNOSIS — R22.33 AXILLARY MASS, BILATERAL: Primary | ICD-10-CM

## 2019-11-18 LAB
ERYTHROCYTE [DISTWIDTH] IN BLOOD BY AUTOMATED COUNT: 12.4 % (ref 11.6–15.1)
ERYTHROCYTE [SEDIMENTATION RATE] IN BLOOD: 8 MM/HOUR (ref 0–20)
HCT VFR BLD AUTO: 39.5 % (ref 34.8–46.1)
HGB BLD-MCNC: 13.3 G/DL (ref 11.5–15.4)
MCH RBC QN AUTO: 32 PG (ref 26.8–34.3)
MCHC RBC AUTO-ENTMCNC: 33.7 G/DL (ref 31.4–37.4)
MCV RBC AUTO: 95 FL (ref 82–98)
PLATELET # BLD AUTO: 226 THOUSANDS/UL (ref 149–390)
PMV BLD AUTO: 11.3 FL (ref 8.9–12.7)
RBC # BLD AUTO: 4.16 MILLION/UL (ref 3.81–5.12)
WBC # BLD AUTO: 11.55 THOUSAND/UL (ref 4.31–10.16)

## 2019-11-18 PROCEDURE — 99213 OFFICE O/P EST LOW 20 MIN: CPT | Performed by: FAMILY MEDICINE

## 2019-11-18 PROCEDURE — 85027 COMPLETE CBC AUTOMATED: CPT | Performed by: FAMILY MEDICINE

## 2019-11-18 PROCEDURE — 1036F TOBACCO NON-USER: CPT | Performed by: FAMILY MEDICINE

## 2019-11-18 PROCEDURE — 85652 RBC SED RATE AUTOMATED: CPT | Performed by: FAMILY MEDICINE

## 2019-11-18 PROCEDURE — 36415 COLL VENOUS BLD VENIPUNCTURE: CPT | Performed by: FAMILY MEDICINE

## 2019-11-18 NOTE — PROGRESS NOTES
Assessment/Plan:  Labs drawn for CBC and sed rate  Will heed results  Patient will continue to observe for changes  Patient instructed to call the office to be evaluated during 1 of these episodes  Recommend patient call her gynecologist to report these episodes  Return to the office krystin Winston Diagnoses and all orders for this visit:    Axillary mass, bilateral  -     CBC and Platelet  -     Sedimentation rate, automated          Subjective:      Patient ID: Deanna Laen is a 30 y o  female  Past 1 year patient complains of intermittent tender lumps under her arms/axilla which usually occur bilaterally  Patient states she has had approximately 8 episodes over the past 1 year and the lumps last for 3 days then resolve spontaneously  She denies redness or drainage  Patient denies fever, chills or sweats  Patient denies breast lumps and had gyn and breast exam with Dr Gatito Rodríguez, gynecologist on 11/11/2019  Patient wonders if this could be related to her Mirena IUD  She did not discuss this with Dr Gatito Rodríguez last week  Most recent episode occurred last week and symptoms resolved 2 days ago  The following portions of the patient's history were reviewed and updated as appropriate: allergies, current medications, past family history, past medical history, past social history, past surgical history and problem list     Review of Systems   Constitutional: Negative for activity change, appetite change, chills, diaphoresis, fatigue, fever and unexpected weight change  Respiratory: Negative for cough, chest tightness, shortness of breath and wheezing  Cardiovascular: Negative for chest pain, palpitations and leg swelling  Gastrointestinal: Negative for abdominal pain, blood in stool, constipation, diarrhea, nausea and vomiting  Endocrine: Negative for cold intolerance and heat intolerance  Genitourinary: Negative for difficulty urinating, dysuria, frequency, hematuria and menstrual problem  Musculoskeletal: Negative for arthralgias, back pain, gait problem, joint swelling, myalgias, neck pain and neck stiffness  Skin: Negative  Neurological: Positive for dizziness and light-headedness  Negative for syncope, weakness and headaches  Hematological: Negative for adenopathy  Does not bruise/bleed easily  Psychiatric/Behavioral: Negative for dysphoric mood and sleep disturbance  The patient is not nervous/anxious  Objective:      /64 (BP Location: Left arm, Patient Position: Sitting, Cuff Size: Standard)   Pulse 76   Temp (!) 97 °F (36 1 °C) (Oral)   Resp 16   Ht 5' 2" (1 575 m)   Wt 60 3 kg (133 lb)   LMP 11/06/2019 (Exact Date)   BMI 24 33 kg/m²          Physical Exam   Constitutional: She is oriented to person, place, and time  She appears well-developed and well-nourished  No distress  HENT:   Head: Normocephalic  Right Ear: External ear normal    Left Ear: External ear normal    Nose: Nose normal    Mouth/Throat: Oropharynx is clear and moist    Eyes: Conjunctivae are normal  No scleral icterus  Neck: Neck supple  No thyromegaly present  Negative axillary nodes or masses  Nontender  Cardiovascular: Normal rate and regular rhythm  Pulmonary/Chest: Effort normal and breath sounds normal    Abdominal: Soft  There is no tenderness  Musculoskeletal: She exhibits no edema  Lymphadenopathy:     She has no cervical adenopathy  Neurological: She is alert and oriented to person, place, and time  Skin: Skin is warm and dry  She is not diaphoretic  Psychiatric: She has a normal mood and affect

## 2019-11-19 DIAGNOSIS — D72.829 LEUKOCYTOSIS, UNSPECIFIED TYPE: Primary | ICD-10-CM

## 2019-12-02 ENCOUNTER — OFFICE VISIT (OUTPATIENT)
Dept: OBGYN CLINIC | Facility: CLINIC | Age: 34
End: 2019-12-02
Payer: COMMERCIAL

## 2019-12-02 ENCOUNTER — ULTRASOUND (OUTPATIENT)
Dept: OBGYN CLINIC | Facility: CLINIC | Age: 34
End: 2019-12-02
Payer: COMMERCIAL

## 2019-12-02 VITALS
WEIGHT: 134.6 LBS | HEIGHT: 62 IN | DIASTOLIC BLOOD PRESSURE: 70 MMHG | BODY MASS INDEX: 24.77 KG/M2 | SYSTOLIC BLOOD PRESSURE: 120 MMHG | HEART RATE: 82 BPM

## 2019-12-02 DIAGNOSIS — Z97.5 IUD CONTRACEPTION: ICD-10-CM

## 2019-12-02 DIAGNOSIS — A63.0 CONDYLOMA ACUMINATA: ICD-10-CM

## 2019-12-02 DIAGNOSIS — N91.4 SECONDARY OLIGOMENORRHEA: ICD-10-CM

## 2019-12-02 DIAGNOSIS — R22.30 AXILLARY MASS, UNSPECIFIED LATERALITY: ICD-10-CM

## 2019-12-02 DIAGNOSIS — R10.32 LLQ PAIN: ICD-10-CM

## 2019-12-02 DIAGNOSIS — R10.2 PELVIC PAIN: Primary | ICD-10-CM

## 2019-12-02 PROCEDURE — 76830 TRANSVAGINAL US NON-OB: CPT | Performed by: OBSTETRICS & GYNECOLOGY

## 2019-12-02 PROCEDURE — 56501 DESTROY VULVA LESIONS SIM: CPT | Performed by: OBSTETRICS & GYNECOLOGY

## 2019-12-02 PROCEDURE — 99213 OFFICE O/P EST LOW 20 MIN: CPT | Performed by: OBSTETRICS & GYNECOLOGY

## 2019-12-02 NOTE — PROGRESS NOTES
Assessment/Plan   Diagnoses and all orders for this visit:    Pelvic pain    IUD contraception    Secondary oligomenorrhea    Axillary mass, unspecified laterality    Condyloma acuminata     1  Condyloma-treated with TCA as documented  Patient was counseled about the findings  Precautions were reviewed  She will follow-up in 2-3 weeks time for re-evaluation and possible retreatment  2  History of left adnexal tenderness/pelvic discomfort-ultrasound today without any adnexal findings  She denies any pelvic pain at this time  She will call or return with any issues  3  Mirena IUD-in good position on ultrasound today  IUD string was previously seen at a length of 2 cm  4  History of possible endometriosis-noted with biopsy at laparoscopy previously  She will continue with Mirena IUD For hormonal treatment  5  Status post bilateral salpingectomy for contraception  6  History of severe dysplasia-status post LEEP cone biopsy 10/24/17 with severe dysplasia with focal extension into the endocervical glands involving the transformation zone  Pap November 2018 with ASCUS with negative HPV  Pap with HPV November 2019 was negative  Patient was relieved to hear this  7  Bilateral axillary masses-she did take a picture of this which appeared most consistent with probable sebaceous cyst versus lymph node  She did discuss this with her primary doctor and CBC was notable for slightly elevated white count of 11 6  Bilateral axillary areas were negative today, but the patient is not symptomatic  These areas seem to come and last for few days and then go away, possibly related to hormonal   She will call or return as needed  To follow-up in the next 2-3 weeks time for exam with possible repeat TCA treatment  Subjective   Patient ID: Jaci Neri is a 30 y o  female  Vitals:    12/02/19 1424   BP: 120/70   Pulse: 82     Patient was seen today for follow-up visit    Please see assessment plan for details        The following portions of the patient's history were reviewed and updated as appropriate: allergies, current medications, past family history, past medical history, past social history, past surgical history and problem list   Past Medical History:   Diagnosis Date    Abnormal Pap smear of cervix     Adnexal fullness     Amenorrhea     Anxiety     Arthralgia of toe     Exposure to parvovirus     GERD (gastroesophageal reflux disease)     during pregnancy    HPV (human papilloma virus) infection     Nephrolithiasis     Umbilical hernia     Urinary tract infection      Past Surgical History:   Procedure Laterality Date    CERVICAL BIOPSY  W/ LOOP ELECTRODE EXCISION      COLPOSCOPY W/ BIOPSY / CURETTAGE      OSTEOTOMY AND ULNAR SHORTENING Right     ID LAP,DIAGNOSTIC ABDOMEN Bilateral 2019    Procedure: DIAGNOSTIC LAP; BILATERAL SALPINGECTOMY; OVARIAN CYSTECTOMY, ECSISION PELVIC CYST;  Surgeon: Edin Simms MD;  Location: [facility];  Service: Gynecology    TOOTH EXTRACTION      [place] teeth    WRIST SURGERY       OB History    Para Term  AB Living   2 2 2 0 0 2   SAB TAB Ectopic Multiple Live Births   0 0 0 0 2      # Outcome Date GA Lbr Kennedy/2nd Weight Sex Delivery Anes PTL Lv   2 Term 17 38w2d / 00:06 3515 g (7 lb 12 oz) M Vag-Spont None N YUSUF   1 Term                Current Outpatient Medications:     levonorgestrel (MIRENA) 20 MCG/24HR IUD, 1 each by Intrauterine route once , Disp: , Rfl:   Allergies   Allergen Reactions    Pollen Extract     Nickel Rash     Some earrings will get rash and very sore     Social History     Socioeconomic History    Marital status: /Civil Union     Spouse name: Not on file    Number of children: Not on file    Years of education: Not on file    Highest education level: Not on file   Occupational History    Not on file   Social Needs    Financial resource strain: Not on file    Food insecurity:     Worry: Not on file     Inability: Not on file    Transportation needs:     Medical: Not on file     Non-medical: Not on file   Tobacco Use    Smoking status: Never Smoker    Smokeless tobacco: Never Used   Substance and Sexual Activity    Alcohol use: Yes     Drinks per session: 1 or 2     Binge frequency: Less than monthly     Comment: occasional    Drug use: No    Sexual activity: Yes     Partners: Male   Lifestyle    Physical activity:     Days per week: Not on file     Minutes per session: Not on file    Stress: Not on file   Relationships    Social connections:     Talks on phone: Not on file     Gets together: Not on file     Attends Taoist service: Not on file     Active member of club or organization: Not on file     Attends meetings of clubs or organizations: Not on file     Relationship status: Not on file    Intimate partner violence:     Fear of current or ex partner: Not on file     Emotionally abused: Not on file     Physically abused: Not on file     Forced sexual activity: Not on file   Other Topics Concern    Not on file   Social History Narrative    Caffeine use    Contraceptive vaginal ring    One child    Taoism affiliation- Shinto     uses safety equipment- seatbelt     Family History   Problem Relation Age of Onset    Diabetes Maternal Grandmother     Heart disease Paternal Grandfather     Hypertension Paternal Grandfather     Hyperlipidemia Paternal Grandfather     Hypertension Father     Hyperlipidemia Father     Heart disease Maternal Grandfather     Diabetes Paternal Grandmother     Diabetes Family        Review of Systems   Constitutional: Negative for chills, diaphoresis, fatigue and fever  Respiratory: Negative for apnea, cough, chest tightness, shortness of breath and wheezing  Cardiovascular: Negative for chest pain, palpitations and leg swelling     Gastrointestinal: Negative for abdominal distention, abdominal pain, anal bleeding, constipation, diarrhea, nausea, rectal pain and vomiting  Genitourinary: Negative for difficulty urinating, dyspareunia, dysuria, frequency, hematuria, menstrual problem, pelvic pain, urgency, vaginal bleeding, vaginal discharge and vaginal pain  Musculoskeletal: Negative for arthralgias, back pain and myalgias  Skin: Negative for color change and rash  Neurological: Negative for dizziness, syncope, light-headedness, numbness and headaches  Hematological: Negative for adenopathy  Does not bruise/bleed easily  Psychiatric/Behavioral: Negative for dysphoric mood and sleep disturbance  The patient is not nervous/anxious  Objective   Physical Exam    Objective      /70 (BP Location: Left arm, Patient Position: Sitting, Cuff Size: Standard)   Pulse 82   Ht 5' 2" (1 575 m)   Wt 61 1 kg (134 lb 9 6 oz)   LMP 11/06/2019 (Exact Date)   BMI 24 62 kg/m²     General:   alert and oriented, in no acute distress   Neck:    Breast: Bilateral axillary regions without any masses noted  Heart:    Lungs:    Abdomen: soft, non-tender, without masses or organomegaly   Vulva: normal   Vagina: Without erythema or lesions or discharge  Normal   Cervix:    Uterus:    Adnexa:    Rectum:     Psych:  Normal mood and affect   Skin:  Without obvious lesions  Two Condyloma noted, 1 about 3 cm below the rectum in the 2nd 1 cm below that  Both were treated with TCA without problems  Patient tolerated the procedure well     Eyes: symmetric, with normal movements and reactivity   Musculoskeletal:  Normal muscle tone and movements appreciated

## 2019-12-02 NOTE — PROGRESS NOTES
AMB US Pelvic Non OB  Date/Time: 12/2/2019 1:30 PM  Performed by: Manny Santiago  Authorized by: Davonte Rosario MD     Procedure details:     Technique:  Transvaginal US, Non-OB    Position: lithotomy exam    Uterine findings:     Length (cm): 6 82    Height (cm):  3 31    Width (cm):  5 1    Endometrial stripe: identified      Endometrium thickness (mm):  0 42  Left ovary findings:     Left ovary:  Visualized    Length (cm): 2 8    Height (cm): 1 5    Width (cm): 1 6  Right ovary findings:     Right ovary:  Visualized    Length (cm): 3 61    Height (cm): 1 75    Width (cm): 2 38  Other findings:     Free pelvic fluid: not identified      Free peritoneal fluid: not identified    Post-Procedure Details:     Impression:  Anteverted uterus demonstrates an IUD in good position within the endometrium  The bilateral ovaries appear within normal limits  No free fluid  Tolerance: Tolerated well, no immediate complications    Complications: no complications    Additional Procedure Comments:      [employer] F8 E8C-RS transvaginal transducer Serial #[id number] was used to perform the examination today and subsequently followed with high level disinfection utilizing Trophon EPR procedure  [facility]     [address] [facility]  [address] [place] [address] [facility]  Phone:  [phone]  Fax:  [phone]

## 2019-12-02 NOTE — PATIENT INSTRUCTIONS
Genital Warts   WHAT YOU NEED TO KNOW:   What are genital warts? Genital warts are a sexually transmitted infection (STI) caused by the human papillomavirus (HPV)  Genital warts are growths that appear in or on the penis, vagina, or anus  Genital warts are spread during genital, anal, or oral sex  A woman can also pass them to a baby when she gives birth  What increases my risk for genital warts? · Not wearing protection during sex, such as a condom    · Multiple sex partners    · A weak immune system caused by other STIs, HIV, or cancer    · Smoking  What are the signs and symptoms of genital warts? Genital warts are flat or dome shaped and can be pink, red, or brown  As the warts grow, your skin may itch or burn  Warts can be painful if they grow together  Over time the warts may look like cauliflower  They may feel moist and rough when you touch them  How are genital warts diagnosed? Your healthcare provider will examine you and ask about your sexual activity  He will use a light to look at your penis, vagina, or anus  You may need any of the following tests:  · An acetic acid test  is when your healthcare provider puts a solution on the affected area  The solution turns the warts white  · A sample of one or more warts  may be removed and sent for tests  The tests may show if you have a higher risk of certain types of cancer, such as cervical cancer  · A Pap smear  may show if you have HPV or other cervical problems  A sample of cells from your cervix are collected and sent for testing  How are genital warts treated? Small genital warts may heal without treatment  In some cases, the warts can get bigger, or you may get more of them  Treatment can help prevent you from spreading warts to others and may help prevent cervical cancer in women  Treatment can also take away your symptoms and help you feel better   You may need any of the following:  · Medicines:      ¨ Immunomodulators  help strengthen your immune system and treat genital warts  ¨ Antiproliferatives  help stop genital warts from growing in size or increasing in number  ¨ Antivirals  help control and stop virus growth, such as HPV  · Procedures:      ¨ Cryotherapy  is used to freeze and destroy genital warts with liquid nitrogen  ¨ Electrocautery  is used to destroy genital warts with heat  ¨ A laser  may be used to remove larger or thicker genital warts  The laser uses heat to destroy the tissues around or near your warts  This treatment may help the areas heal without leaving scars  ¨ Surgery  with a scalpel, scissors, or other surgical tools may be used to remove the warts  How can I manage my symptoms? · Do not touch or scratch the warts  This can cause the infection to spread to other parts of your body  · Do not have sex while you are being treated for genital warts  Medicine used on your skin weakens condoms and diaphragms  You also risk spreading genital warts to your partner  · Get regular Pap smears  If you are a woman, this can help diagnose HPV and prevent the spread of the virus  How can I help prevent the spread of genital warts? · Tell your sexual partners that you are being treated for genital warts  They may also be infected and need treatment  · Get the HPV vaccine  The HPV vaccine is given at 5to 32years of age to help prevent cervical cancer and genital warts  Ask your healthcare provider for more information about this vaccine  When should I contact my healthcare provider? · Your genital warts return  · The skin that is being treated for genital warts is very painful or swollen  · You see or feel new warts on another part of your body  · You have questions or concerns about your condition or care  CARE AGREEMENT:   You have the right to help plan your care  Learn about your health condition and how it may be treated   Discuss treatment options with your caregivers to decide what care you want to receive  You always have the right to refuse treatment  The above information is an  only  It is not intended as medical advice for individual conditions or treatments  Talk to your doctor, nurse or pharmacist before following any medical regimen to see if it is safe and effective for you  © 2017 [address] [employer] Information is for End User's use only and may not be sold, redistributed or otherwise used for commercial purposes  All illustrations and images included in CareNotes® are the copyrighted property of A D A [employer]  or [employer]key Douglass

## 2019-12-18 ENCOUNTER — CLINICAL SUPPORT (OUTPATIENT)
Dept: FAMILY MEDICINE CLINIC | Facility: CLINIC | Age: 34
End: 2019-12-18
Payer: COMMERCIAL

## 2019-12-18 DIAGNOSIS — D72.829 LEUKOCYTOSIS, UNSPECIFIED TYPE: ICD-10-CM

## 2019-12-18 LAB
ERYTHROCYTE [DISTWIDTH] IN BLOOD BY AUTOMATED COUNT: 12.5 % (ref 11.6–15.1)
HCT VFR BLD AUTO: 40.9 % (ref 34.8–46.1)
HGB BLD-MCNC: 13.7 G/DL (ref 11.5–15.4)
MCH RBC QN AUTO: 32.1 PG (ref 26.8–34.3)
MCHC RBC AUTO-ENTMCNC: 33.5 G/DL (ref 31.4–37.4)
MCV RBC AUTO: 96 FL (ref 82–98)
PLATELET # BLD AUTO: 204 THOUSANDS/UL (ref 149–390)
PMV BLD AUTO: 11.6 FL (ref 8.9–12.7)
RBC # BLD AUTO: 4.27 MILLION/UL (ref 3.81–5.12)
WBC # BLD AUTO: 9.75 THOUSAND/UL (ref 4.31–10.16)

## 2019-12-18 PROCEDURE — 36415 COLL VENOUS BLD VENIPUNCTURE: CPT | Performed by: FAMILY MEDICINE

## 2019-12-18 PROCEDURE — 85027 COMPLETE CBC AUTOMATED: CPT | Performed by: FAMILY MEDICINE

## 2020-01-19 ENCOUNTER — OFFICE VISIT (OUTPATIENT)
Dept: URGENT CARE | Facility: HOSPITAL | Age: 35
End: 2020-01-19
Payer: COMMERCIAL

## 2020-01-19 VITALS
DIASTOLIC BLOOD PRESSURE: 75 MMHG | RESPIRATION RATE: 18 BRPM | TEMPERATURE: 99.1 F | OXYGEN SATURATION: 98 % | SYSTOLIC BLOOD PRESSURE: 112 MMHG | HEART RATE: 86 BPM | WEIGHT: 134.6 LBS | BODY MASS INDEX: 24.77 KG/M2 | HEIGHT: 62 IN

## 2020-01-19 DIAGNOSIS — J02.9 ACUTE PHARYNGITIS, UNSPECIFIED ETIOLOGY: Primary | ICD-10-CM

## 2020-01-19 LAB — S PYO AG THROAT QL: NEGATIVE

## 2020-01-19 PROCEDURE — 87070 CULTURE OTHR SPECIMN AEROBIC: CPT | Performed by: NURSE PRACTITIONER

## 2020-01-19 PROCEDURE — 99213 OFFICE O/P EST LOW 20 MIN: CPT | Performed by: NURSE PRACTITIONER

## 2020-01-19 PROCEDURE — 87880 STREP A ASSAY W/OPTIC: CPT | Performed by: NURSE PRACTITIONER

## 2020-01-19 RX ORDER — AMOXICILLIN 500 MG/1
500 CAPSULE ORAL EVERY 12 HOURS SCHEDULED
Qty: 20 CAPSULE | Refills: 0 | Status: SHIPPED | OUTPATIENT
Start: 2020-01-19 | End: 2020-01-29

## 2020-01-19 NOTE — LETTER
January 19, 2020     Patient: Tala Art   YOB: 1985   Date of Visit: 1/19/2020       To Whom It May Concern: It is my medical opinion that Mario May may return to work on when fever free for 24 hours       If you have any questions or concerns, please don't hesitate to call  Sincerely,        CHIKIS Benavidez    CC: Radha Giraldo

## 2020-01-19 NOTE — PATIENT INSTRUCTIONS
Rapid strep negative  Will treat based on symptoms  Tylenol or motrin as needed for pain or fever  Salt water gargles and throat lozenges as needed  Chloraseptic spray may help with pain  Follow up with PCP if no improvement  Go to ER with worsening symptoms  Pharyngitis   AMBULATORY CARE:   Pharyngitis , or sore throat, is inflammation of the tissues and structures in your pharynx (throat)  Pharyngitis is most often caused by bacteria  It may also be caused by a cold or flu virus  Other causes include smoking, allergies, or acid reflux  Signs and symptoms that may occur with pharyngitis:   · Sore throat or pain when you swallow    · Fever, chills, and body aches    · Hoarse or raspy voice    · Cough, runny or stuffy nose, itchy or watery eyes    · Headache    · Upset stomach and loss of appetite    · Mild neck stiffness    · Swollen glands that feel like hard lumps when you touch your neck    · White and yellow pus-filled blisters in the back of your throat  Call 911 for any of the following:   · You have trouble breathing or swallowing because your throat is swollen or sore  Seek care immediately if:   · You are drooling because it hurts too much to swallow  · Your fever is higher than 102? F (39?C) or lasts longer than 3 days  · You are confused  · You taste blood in your throat  Contact your healthcare provider if:   · Your throat pain gets worse  · You have a painful lump in your throat that does not go away after 5 days  · Your symptoms do not improve after 5 days  · You have questions or concerns about your condition or care  Treatment for pharyngitis:  Viral pharyngitis will go away on its own without treatment  Your sore throat should start to feel better in 3 to 5 days for both viral and bacterial infections  You may need any of the following  · NSAIDs , such as ibuprofen, help decrease swelling, pain, and fever   NSAIDs can cause stomach bleeding or kidney problems in certain people  If you take blood thinner medicine, always ask your healthcare provider if NSAIDs are safe for you  Always read the medicine label and follow directions  · Acetaminophen  decreases pain and fever  It is available without a doctor's order  Ask how much to take and how often to take it  Follow directions  Acetaminophen can cause liver damage if not taken correctly  Manage your symptoms:   · Gargle salt water  Mix ¼ teaspoon salt in an 8 ounce glass of warm water and gargle  This may help decrease swelling in your throat  · Drink liquids as directed  You may need to drink more liquids than usual  Liquids may help soothe your throat and prevent dehydration  Ask how much liquid to drink each day and which liquids are best for you  · Use a cool-steam humidifier  to help moisten the air in your room and calm your cough  · Soothe your throat  with cough drops, ice, soft foods, or popsicles  Prevent the spread of pharyngitis:  Cover your mouth and nose when you cough or sneeze  Do not share food or drinks  Wash your hands often  Use soap and water  If soap and water are unavailable, use an alcohol based hand   Follow up with your healthcare provider as directed:  Write down your questions so you remember to ask them during your visits  © 2017 [address]  Information is for End User's use only and may not be sold, redistributed or otherwise used for commercial purposes  All illustrations and images included in CareNotes® are the copyrighted property of A D A [facility] , [employer]  or [employer]key Douglass  The above information is an  only  It is not intended as medical advice for individual conditions or treatments  Talk to your doctor, nurse or pharmacist before following any medical regimen to see if it is safe and effective for you

## 2020-01-19 NOTE — PROGRESS NOTES
[facility]        NAME: Divya Perkins is a 30 y o  female  : 1985    MRN: [id number]  DATE: 2020  TIME: 11:00 AM     Assessment and Plan   Acute pharyngitis, unspecified etiology [J02 9]  1  Acute pharyngitis, unspecified etiology  amoxicillin (AMOXIL) 500 mg capsule    POCT rapid strepA    Throat culture         Patient Instructions     Patient Instructions   Rapid strep negative  Will treat based on symptoms  Tylenol or motrin as needed for pain or fever  Salt water gargles and throat lozenges as needed  Chloraseptic spray may help with pain  Follow up with PCP if no improvement  Go to ER with worsening symptoms  Chief Complaint     Chief Complaint   Patient presents with    Sore Throat     Patient c/o sore throat, headache, and body aches x 3 days         History of Present Illness   Divya Perkins presents to the clinic c/o    Tthis is a 74-year-old female here today with complaints of sore throat, body aches, headache, fever and chills  She denies any cough or congestion at this time  She states she has had symptoms for about 3 days  She has been eating and drinking  She did not have her influenza vaccine  Review of Systems   Review of Systems   Constitutional: Positive for activity change, chills, fatigue and fever  HENT: Positive for sore throat  Negative for congestion, sinus pressure, sinus pain and sneezing  Respiratory: Negative for cough  Cardiovascular: Negative  Skin: Negative  Neurological: Negative  Psychiatric/Behavioral: Negative            Current Medications     Long-Term Medications   Medication Sig Dispense Refill    levonorgestrel (MIRENA) 20 MCG/24HR IUD 1 each by Intrauterine route once          Current Allergies     Allergies as of 2020 - Reviewed 2020   Allergen Reaction Noted    Pollen extract  2013    Nickel Rash 2019            The following portions of the patient's history were reviewed and updated as appropriate: allergies, current medications, past family history, past medical history, past social history, past surgical history and problem list     Objective   /75   Pulse 86   Temp 99 1 °F (37 3 °C) (Oral)   Resp 18   Ht 5' 2" (1 575 m)   Wt 61 1 kg (134 lb 9 6 oz)   LMP 12/23/2019   SpO2 98%   BMI 24 62 kg/m²        Physical Exam     Physical Exam   Constitutional: She is oriented to person, place, and time  She appears well-developed and well-nourished  She does not appear ill  No distress  HENT:   Mouth/Throat: Posterior oropharyngeal erythema present  No posterior oropharyngeal edema  Posterior pharynx is erythemic with no exudate tonsils 1/4   Neck: Normal range of motion  Neck supple  Cardiovascular: Normal rate, regular rhythm and normal heart sounds  Pulmonary/Chest: Effort normal and breath sounds normal  No respiratory distress  She has no wheezes  She has no rhonchi  She has no rales  She exhibits no tenderness  Neurological: She is alert and oriented to person, place, and time  Psychiatric: She has a normal mood and affect  Her behavior is normal    Nursing note and vitals reviewed      Rapid strep negative

## 2020-01-21 LAB — BACTERIA THROAT CULT: NORMAL

## 2020-09-28 ENCOUNTER — APPOINTMENT (OUTPATIENT)
Dept: LAB | Facility: MEDICAL CENTER | Age: 35
End: 2020-09-28
Payer: COMMERCIAL

## 2020-09-28 ENCOUNTER — OFFICE VISIT (OUTPATIENT)
Dept: RHEUMATOLOGY | Facility: CLINIC | Age: 35
End: 2020-09-28
Payer: COMMERCIAL

## 2020-09-28 VITALS
DIASTOLIC BLOOD PRESSURE: 75 MMHG | HEART RATE: 71 BPM | HEIGHT: 62 IN | TEMPERATURE: 97.8 F | SYSTOLIC BLOOD PRESSURE: 120 MMHG | WEIGHT: 131.8 LBS | BODY MASS INDEX: 24.25 KG/M2

## 2020-09-28 DIAGNOSIS — M25.50 ARTHRALGIA, UNSPECIFIED JOINT: Primary | ICD-10-CM

## 2020-09-28 DIAGNOSIS — M79.10 MYALGIA: ICD-10-CM

## 2020-09-28 LAB
25(OH)D3 SERPL-MCNC: 23.9 NG/ML (ref 30–100)
ALBUMIN SERPL BCP-MCNC: 4.6 G/DL (ref 3.5–5)
ALP SERPL-CCNC: 81 U/L (ref 46–116)
ALT SERPL W P-5'-P-CCNC: 19 U/L (ref 12–78)
ANION GAP SERPL CALCULATED.3IONS-SCNC: 6 MMOL/L (ref 4–13)
AST SERPL W P-5'-P-CCNC: 16 U/L (ref 5–45)
BASOPHILS # BLD AUTO: 0.04 THOUSANDS/ΜL (ref 0–0.1)
BASOPHILS NFR BLD AUTO: 0 % (ref 0–1)
BILIRUB SERPL-MCNC: 0.43 MG/DL (ref 0.2–1)
BUN SERPL-MCNC: 14 MG/DL (ref 5–25)
CALCIUM SERPL-MCNC: 9.2 MG/DL (ref 8.3–10.1)
CHLORIDE SERPL-SCNC: 104 MMOL/L (ref 100–108)
CO2 SERPL-SCNC: 29 MMOL/L (ref 21–32)
CREAT SERPL-MCNC: 0.73 MG/DL (ref 0.6–1.3)
CRP SERPL QL: <3 MG/L
EOSINOPHIL # BLD AUTO: 0.13 THOUSAND/ΜL (ref 0–0.61)
EOSINOPHIL NFR BLD AUTO: 1 % (ref 0–6)
ERYTHROCYTE [DISTWIDTH] IN BLOOD BY AUTOMATED COUNT: 12.7 % (ref 11.6–15.1)
ERYTHROCYTE [SEDIMENTATION RATE] IN BLOOD: 10 MM/HOUR (ref 0–19)
GFR SERPL CREATININE-BSD FRML MDRD: 109 ML/MIN/1.73SQ M
GLUCOSE SERPL-MCNC: 82 MG/DL (ref 65–140)
HCT VFR BLD AUTO: 45.7 % (ref 34.8–46.1)
HGB BLD-MCNC: 14.6 G/DL (ref 11.5–15.4)
IMM GRANULOCYTES # BLD AUTO: 0.02 THOUSAND/UL (ref 0–0.2)
IMM GRANULOCYTES NFR BLD AUTO: 0 % (ref 0–2)
LYMPHOCYTES # BLD AUTO: 3.72 THOUSANDS/ΜL (ref 0.6–4.47)
LYMPHOCYTES NFR BLD AUTO: 41 % (ref 14–44)
MCH RBC QN AUTO: 31.5 PG (ref 26.8–34.3)
MCHC RBC AUTO-ENTMCNC: 31.9 G/DL (ref 31.4–37.4)
MCV RBC AUTO: 99 FL (ref 82–98)
MONOCYTES # BLD AUTO: 0.6 THOUSAND/ΜL (ref 0.17–1.22)
MONOCYTES NFR BLD AUTO: 7 % (ref 4–12)
NEUTROPHILS # BLD AUTO: 4.57 THOUSANDS/ΜL (ref 1.85–7.62)
NEUTS SEG NFR BLD AUTO: 51 % (ref 43–75)
NRBC BLD AUTO-RTO: 0 /100 WBCS
PLATELET # BLD AUTO: 250 THOUSANDS/UL (ref 149–390)
PMV BLD AUTO: 11.6 FL (ref 8.9–12.7)
POTASSIUM SERPL-SCNC: 4.3 MMOL/L (ref 3.5–5.3)
PROT SERPL-MCNC: 8 G/DL (ref 6.4–8.2)
RBC # BLD AUTO: 4.64 MILLION/UL (ref 3.81–5.12)
SODIUM SERPL-SCNC: 139 MMOL/L (ref 136–145)
WBC # BLD AUTO: 9.08 THOUSAND/UL (ref 4.31–10.16)

## 2020-09-28 PROCEDURE — 80053 COMPREHEN METABOLIC PANEL: CPT | Performed by: INTERNAL MEDICINE

## 2020-09-28 PROCEDURE — 86038 ANTINUCLEAR ANTIBODIES: CPT | Performed by: INTERNAL MEDICINE

## 2020-09-28 PROCEDURE — 36415 COLL VENOUS BLD VENIPUNCTURE: CPT | Performed by: INTERNAL MEDICINE

## 2020-09-28 PROCEDURE — 85025 COMPLETE CBC W/AUTO DIFF WBC: CPT | Performed by: INTERNAL MEDICINE

## 2020-09-28 PROCEDURE — 85652 RBC SED RATE AUTOMATED: CPT | Performed by: INTERNAL MEDICINE

## 2020-09-28 PROCEDURE — 82306 VITAMIN D 25 HYDROXY: CPT | Performed by: INTERNAL MEDICINE

## 2020-09-28 PROCEDURE — 99244 OFF/OP CNSLTJ NEW/EST MOD 40: CPT | Performed by: INTERNAL MEDICINE

## 2020-09-28 PROCEDURE — 86200 CCP ANTIBODY: CPT | Performed by: INTERNAL MEDICINE

## 2020-09-28 PROCEDURE — 86430 RHEUMATOID FACTOR TEST QUAL: CPT | Performed by: INTERNAL MEDICINE

## 2020-09-28 PROCEDURE — 86140 C-REACTIVE PROTEIN: CPT | Performed by: INTERNAL MEDICINE

## 2020-09-28 RX ORDER — NAPROXEN 500 MG/1
500 TABLET ORAL 2 TIMES DAILY PRN
Qty: 60 TABLET | Refills: 5 | Status: SHIPPED | OUTPATIENT
Start: 2020-09-28 | End: 2022-07-07

## 2020-09-28 NOTE — PATIENT INSTRUCTIONS
Do labs  Take naproxen as needed for joint pain    Return to clinic in 5 months    Arthritis   AMBULATORY CARE:   Arthritis  is a disease that causes inflammation in one or more joints  There are many types of arthritis, such as osteoarthritis, rheumatoid arthritis, and septic arthritis  Some types cause inflammation in the joints  Other types wear away the cartilage between joints  This makes the bones of the joint rub together when you move the joint  Your symptoms may be constant, or symptoms may come and go  Arthritis often gets worse over time and can cause permanent joint damage  Common signs and symptoms of arthritis:   · Pain, swelling, or stiffness in the joint    · Limited range of motion in the joint    · Warmth or redness over the joint    · Tenderness when you touch the joint    · Stiff joints in the morning that loosen with movement    · A creaking or grinding sound when you move the joint    · Fever  Seek care immediately if:   · You have a fever and severe joint pain or swelling  · You cannot move the affected joint  · You have severe joint pain you cannot tolerate  Contact your healthcare provider if:   · Your pain or swelling does not get better with treatment  · You have questions or concerns about your condition or care  Treatment  will depend on the type of arthritis you have and if it is severe  You may need any of the following:  · Acetaminophen  decreases pain and fever  It is available without a doctor's order  Ask how much to take and how often to take it  Follow directions  Acetaminophen can cause liver damage if not taken correctly  · NSAIDs , such as ibuprofen, help decrease swelling, pain, and fever  This medicine is available with or without a doctor's order  NSAIDs can cause stomach bleeding or kidney problems in certain people  If you take blood thinner medicine, always ask your healthcare provider if NSAIDs are safe for you   Always read the medicine label and follow directions  · Steroid medicine  helps reduce swelling and pain  · Surgery  may be needed to repair or replace a damaged joint  Manage arthritis:   · Rest your painful joint so it can heal   Your healthcare provider may recommend crutches or a walker if the affected joint is in a leg  · Apply ice or heat to the joint  Both can help decrease swelling and pain  Ice may also help prevent tissue damage  Use an ice pack, or put crushed ice in a plastic bag  Cover it with a towel and place it on your joint for 15 to 20 minutes every hour or as directed  You can apply heat for 20 minutes every 2 hours  Heat treatment includes hot packs or heat lamps  · Elevate your joint  Elevation helps reduce swelling and pain  Raise your joint above the level of your heart as often as you can  Prop your painful joint on pillows to keep it above your heart comfortably  · Go to physical or occupational therapy as directed  A physical therapist can teach you exercises to improve flexibility and range of motion  You may also be shown non-weight-bearing exercises that are safe for your joints, such as swimming  Exercise can help keep your joints flexible and reduce pain  An occupational therapist can help you learn to do your daily activities when your joints are stiff or sore  · Maintain a healthy weight  Extra weight puts increased pressure on your joints  Ask your healthcare provider what you should weigh  If you need to lose weight, he can help you create a weight loss program  Weight loss can help reduce pain and increase your ability to do your activities  The amount of exercise you do may vary each day, depending on your symptoms  · Wear flat or low-heeled shoes  This will help decrease pain and reduce pressure on your ankle, knee, and hip joints  · Use support devices as directed  You may be given splints to wear on your hands to help your joints rest and to decrease inflammation   While you sleep, use a pillow that is firm enough to support your neck and head  Other equipment  that may help you move and prevent falls:  · Orthotic shoes or insoles  help support your feet when you walk  · Crutches, a cane, or a walker  may help decrease your risk for falling  They also decrease stress on affected joints  · Devices to prevent falls  include raised toilet seats and bathtub bars to help you get up from sitting  Handrails can be placed in areas where you need balance and support  Follow up with your healthcare provider or rheumatologist as directed:  Write down your questions so you remember to ask them during your visits  © 2017 [facility] Information is for End User's use only and may not be sold, redistributed or otherwise used for commercial purposes  All illustrations and images included in CareNotes® are the copyrighted property of [employer]  or [employer]key Douglass  The above information is an  only  It is not intended as medical advice for individual conditions or treatments  Talk to your doctor, nurse or pharmacist before following any medical regimen to see if it is safe and effective for you

## 2020-09-28 NOTE — PROGRESS NOTES
Assessment and Plan: Jaci Neri is a 37 y o   female who presents as a Rheumatology consult referred by her PCP Rubia Grossman DO for evaluation of possible inflammatory arthritis  Patient mainly complains of arthritis symptoms in hands and feet  Her hand symptoms are worse at night and and come around her period time  Her feet symptoms are better with activity  Patient also used to get rashes on forehead and cheeks, which has resolved  ASMMI ordered to rule-out lupus, which returned negative  RF and anti-CCP to evaluate for RA also returned negative  Her ESR/CRP inflammatory markers are normal as well  Her work-up therefore is overall negative for an autoimmune disease/inflammatory arthritis  Her Vit  D level returned a little low, which can contribute to myalgia/arthralgia; recommended patient to take daily OTC Vit  D  Prescribed naproxen 500mg po bid prn for joint pain for now  Will reassess how she is doing in five months  Plan:  Diagnoses and all orders for this visit:    Arthralgia, unspecified joint  -     CBC and differential  -     Comprehensive metabolic panel  -     C-reactive protein  -     Sedimentation rate, automated  -     SAMMI Screen w/ Reflex to Titer/Pattern  -     RF Screen w/ Reflex to Titer  -     Cyclic citrul peptide antibody, IgG  -     naproxen (NAPROSYN) 500 mg tablet; Take 1 tablet (500 mg total) by mouth 2 (two) times a day as needed for mild pain or moderate pain Take with food    Myalgia  -     Vitamin D 25 hydroxy    Follow-up plan: Return to clinic in 5 months      HPI  Jaci Neri is a 37 y o   female who presents as a Rheumatology consult referred by her PCP Rubia Grossman DO for evaluation of possible inflammatory arthritis   Patient complains of primarily haing joint paint in her hands that started over a year ago, and is usually worse around her period; pain is across the MCPs, no swelling or warmth, particularly worse at night, lasts a few days then goes away  Her feet bother her as well, no redness, admits to itchiness at the bottom of feet, has pain in feet when gets up in morning; pain in feet is better with activity, but not necessarily hand pain  Naproxen helped, Tylenol or Advil has helped as well  Last year, had rashes on forehead and cheeks that has resolved; admits to numbness when sleeping, sometimes an entire leg; gets chest pain sometimes  Denies sicca symptoms, oral or nasal ulcers, alopecia, Raynaud's symptoms, no h/o blood clots or miscarriages  She admits that she used to get lumps under her armpits that were painful and have resolved  Review of Systems  Review of Systems   Constitutional: Negative for chills, fatigue, fever and unexpected weight change  HENT: Negative for mouth sores and trouble swallowing  Eyes: Negative for pain and visual disturbance  Respiratory: Negative for cough and shortness of breath  Cardiovascular: Negative for chest pain and leg swelling  Gastrointestinal: Negative for abdominal pain, blood in stool, constipation, diarrhea and nausea  Musculoskeletal: Positive for arthralgias  Negative for back pain, joint swelling and myalgias  Skin: Negative for color change and rash  Neurological: Negative for weakness and numbness  Hematological: Negative for adenopathy  Psychiatric/Behavioral: Negative for sleep disturbance         Allergies  Allergies   Allergen Reactions    Pollen Extract     Nickel Rash     Some earrings will get rash and very sore       Home Medications    Current Outpatient Medications:     levonorgestrel (MIRENA) 20 MCG/24HR IUD, 1 each by Intrauterine route once , Disp: , Rfl:     naproxen (NAPROSYN) 500 mg tablet, Take 1 tablet (500 mg total) by mouth 2 (two) times a day as needed for mild pain or moderate pain Take with food, Disp: 60 tablet, Rfl: 5    Past Medical History  Past Medical History:   Diagnosis Date    Abnormal Pap smear of cervix     Adnexal fullness     Amenorrhea     Anxiety     Arthralgia of toe     Endometriosis 11/17/2020    Exposure to parvovirus     GERD (gastroesophageal reflux disease)     during pregnancy    HPV (human papilloma virus) infection     Nephrolithiasis     Umbilical hernia     Urinary tract infection        Past Surgical History   Past Surgical History:   Procedure Laterality Date    CERVICAL BIOPSY  W/ LOOP ELECTRODE EXCISION      COLPOSCOPY W/ BIOPSY / CURETTAGE      OSTEOTOMY AND ULNAR SHORTENING Right     [place] LAP,DIAGNOSTIC ABDOMEN Bilateral 4/16/2019    Procedure: DIAGNOSTIC LAP; BILATERAL SALPINGECTOMY; OVARIAN CYSTECTOMY, ECSISION PELVIC CYST;  Surgeon: Max Miller MD;  Location: [facility];  Service: Gynecology    TOOTH EXTRACTION      [place] teeth    WRIST SURGERY         Family History    Family History   Problem Relation Age of Onset    Diabetes Maternal Grandmother     Heart disease Paternal Grandfather     Hypertension Paternal Grandfather     Hyperlipidemia Paternal Grandfather     Hypertension Father     Hyperlipidemia Father     Heart disease Maternal Grandfather     Diabetes Paternal Grandmother     Diabetes Family      No known family history of autoimmune or inflammatory diseases  Social History  Occupation:  at [address] [place] [address] Road History     Substance and Sexual Activity   Alcohol Use Yes    Drinks per session: 1 or 2    Binge frequency: Less than monthly    Comment: occasional     Social History     Substance and Sexual Activity   Drug Use No     Social History     Tobacco Use   Smoking Status Never Smoker   Smokeless Tobacco Never Used   drinks alcohol once a week    Objective:  Vitals:    09/28/20 1349   BP: 120/75   Pulse: 71   Temp: 97 8 °F (36 6 °C)   Weight: 59 8 kg (131 lb 12 8 oz)   Height: 5' 2" (1 575 m)       Physical Exam  Constitutional:       General: She is not in acute distress  Appearance: She is well-developed     HENT:      Head: Normocephalic and atraumatic  Eyes:      General: Lids are normal  No scleral icterus  Conjunctiva/sclera: Conjunctivae normal    Neck:      Musculoskeletal: Neck supple  No muscular tenderness  Thyroid: No thyromegaly  Cardiovascular:      Rate and Rhythm: Normal rate and regular rhythm  Heart sounds: S1 normal and S2 normal  No murmur  No friction rub  Pulmonary:      Effort: Pulmonary effort is normal  No tachypnea or respiratory distress  Breath sounds: Normal breath sounds  No wheezing, rhonchi or rales  Musculoskeletal:         General: No swelling or tenderness  Lymphadenopathy:      Head:      Right side of head: No submental or submandibular adenopathy  Left side of head: No submental or submandibular adenopathy  Cervical: No cervical adenopathy  Skin:     General: Skin is warm and dry  Findings: No rash  Nails: There is no clubbing  Neurological:      Mental Status: She is alert  Sensory: No sensory deficit  Psychiatric:         Behavior: Behavior normal  Behavior is cooperative  Reviewed labs      Labs:   Office Visit on 09/28/2020   Component Date Value Ref Range Status    WBC 09/28/2020 9 08  4 31 - 10 16 Thousand/uL Final    RBC 09/28/2020 4 64  3 81 - 5 12 Million/uL Final    Hemoglobin 09/28/2020 14 6  11 5 - 15 4 g/dL Final    Hematocrit 09/28/2020 45 7  34 8 - 46 1 % Final    MCV 09/28/2020 99* 82 - 98 fL Final    MCH 09/28/2020 31 5  26 8 - 34 3 pg Final    MCHC 09/28/2020 31 9  31 4 - 37 4 g/dL Final    RDW 09/28/2020 12 7  11 6 - 15 1 % Final    MPV 09/28/2020 11 6  8 9 - 12 7 fL Final    Platelets 86/52/2187 250  149 - 390 Thousands/uL Final    nRBC 09/28/2020 0  /100 WBCs Final    Neutrophils Relative 09/28/2020 51  43 - 75 % Final    Immat GRANS % 09/28/2020 0  0 - 2 % Final    Lymphocytes Relative 09/28/2020 41  14 - 44 % Final    Monocytes Relative 09/28/2020 7  4 - 12 % Final    Eosinophils Relative 09/28/2020 1  0 - 6 % Final    Basophils Relative 09/28/2020 0  0 - 1 % Final    Neutrophils Absolute 09/28/2020 4 57  1 85 - 7 62 Thousands/µL Final    Immature Grans Absolute 09/28/2020 0 02  0 00 - 0 20 Thousand/uL Final    Lymphocytes Absolute 09/28/2020 3 72  0 60 - 4 47 Thousands/µL Final    Monocytes Absolute 09/28/2020 0 60  0 17 - 1 22 Thousand/µL Final    Eosinophils Absolute 09/28/2020 0 13  0 00 - 0 61 Thousand/µL Final    Basophils Absolute 09/28/2020 0 04  0 00 - 0 10 Thousands/µL Final    Sodium 09/28/2020 139  136 - 145 mmol/L Final    Potassium 09/28/2020 4 3  3 5 - 5 3 mmol/L Final    Chloride 09/28/2020 104  100 - 108 mmol/L Final    CO2 09/28/2020 29  21 - 32 mmol/L Final    ANION GAP 09/28/2020 6  4 - 13 mmol/L Final    BUN 09/28/2020 14  5 - 25 mg/dL Final    Creatinine 09/28/2020 0 73  0 60 - 1 30 mg/dL Final    Standardized to IDMS reference method    Glucose 09/28/2020 82  65 - 140 mg/dL Final    If the patient is fasting, the ADA then defines impaired fasting glucose as > 100 mg/dL and diabetes as > or equal to 123 mg/dL  Specimen collection should occur prior to Sulfasalazine administration due to the potential for falsely depressed results  Specimen collection should occur prior to Sulfapyridine administration due to the potential for falsely elevated results   Calcium 09/28/2020 9 2  8 3 - 10 1 mg/dL Final    AST 09/28/2020 16  5 - 45 U/L Final    Specimen collection should occur prior to Sulfasalazine administration due to the potential for falsely depressed results   ALT 09/28/2020 19  12 - 78 U/L Final    Specimen collection should occur prior to Sulfasalazine and/or Sulfapyridine administration due to the potential for falsely depressed results       Alkaline Phosphatase 09/28/2020 81  46 - 116 U/L Final    Total Protein 09/28/2020 8 0  6 4 - 8 2 g/dL Final    Albumin 09/28/2020 4 6  3 5 - 5 0 g/dL Final    Total Bilirubin 09/28/2020 0 43  0 20 - 1 00 mg/dL Final    Use of this assay is not recommended for patients undergoing treatment with eltrombopag due to the potential for falsely elevated results      eGFR 09/28/2020 109  ml/min/1 73sq m Final    CRP 09/28/2020 <3 0  <3 0 mg/L Final    Sed Rate 09/28/2020 10  0 - 19 mm/hour Final    Vit D, 25-Hydroxy 09/28/2020 23 9* 30 0 - 100 0 ng/mL Final    SAMMI 09/28/2020 Negative  Negative Final    Rheumatoid Factor 09/28/2020 Negative  Negative Final    Cyclic Citrullin Peptide Ab 09/28/2020 6  0 - 19 units Final                              Negative               <20                            Weak positive      20 - 39                            Moderate positive  40 - 59                            Strong positive        >59   Office Visit on 01/19/2020   Component Date Value Ref Range Status     RAPID STREP A 01/19/2020 Negative  Negative Final    Throat Culture 01/19/2020 Negative for beta-hemolytic Streptococcus   Final   Clinical Support on 12/18/2019   Component Date Value Ref Range Status    WBC 12/18/2019 9 75  4 31 - 10 16 Thousand/uL Final    RBC 12/18/2019 4 27  3 81 - 5 12 Million/uL Final    Hemoglobin 12/18/2019 13 7  11 5 - 15 4 g/dL Final    Hematocrit 12/18/2019 40 9  34 8 - 46 1 % Final    MCV 12/18/2019 96  82 - 98 fL Final    MCH 12/18/2019 32 1  26 8 - 34 3 pg Final    MCHC 12/18/2019 33 5  31 4 - 37 4 g/dL Final    RDW 12/18/2019 12 5  11 6 - 15 1 % Final    Platelets 33/14/8044 204  149 - 390 Thousands/uL Final    MPV 12/18/2019 11 6  8 9 - 12 7 fL Final   Office Visit on 11/18/2019   Component Date Value Ref Range Status    WBC 11/18/2019 11 55* 4 31 - 10 16 Thousand/uL Final    RBC 11/18/2019 4 16  3 81 - 5 12 Million/uL Final    Hemoglobin 11/18/2019 13 3  11 5 - 15 4 g/dL Final    Hematocrit 11/18/2019 39 5  34 8 - 46 1 % Final    MCV 11/18/2019 95  82 - 98 fL Final    MCH 11/18/2019 32 0  26 8 - 34 3 pg Final    MCHC 11/18/2019 33 7  31 4 - 37 4 g/dL Final    RDW 11/18/2019 12 4  11 6 - 15 1 % Final    Platelets 56/84/0141 226  149 - 390 Thousands/uL Final    MPV 11/18/2019 11 3  8 9 - 12 7 fL Final    Sed Rate 11/18/2019 8  0 - 20 mm/hour Final   Annual Exam on 11/11/2019   Component Date Value Ref Range Status    Case Report 11/11/2019    Final                    Value:Gynecologic Cytology Report                       Case: [id number]                                  Authorizing Provider:  Marcelle Ace MD            Collected:           11/11/2019 1630              Ordering Location:     [facility] [address] [facility]    Received:            11/11/2019 1630                                     [place]                                                                    First Screen:          Ozella Flatten, CT                                                    Specimen:    LIQUID-BASED PAP, SCREENING, Cervix, Endocervical                                          Primary Interpretation 11/11/2019 Negative for intraepithelial lesion or malignancy   Final    Specimen Adequacy 11/11/2019 Satisfactory for evaluation  Endocervical/transformation zone component present  Final    Additional Information 11/11/2019    Final                    Value: This result contains rich text formatting which cannot be displayed here   HPV Other HR 11/11/2019 Negative  Negative Final    HPV types: 30,32,24,48,27,00,66,60,97,76,69 and 68 DNA are undetectable or below the pre-set threshold      HPV16 11/11/2019 Negative  Negative Final    HPV18 11/11/2019 Negative  Negative Final

## 2020-09-29 LAB — RHEUMATOID FACT SER QL LA: NEGATIVE

## 2020-09-30 LAB
CCP IGA+IGG SERPL IA-ACNC: 6 UNITS (ref 0–19)
RYE IGE QN: NEGATIVE

## 2020-10-23 ENCOUNTER — TELEMEDICINE (OUTPATIENT)
Dept: FAMILY MEDICINE CLINIC | Facility: CLINIC | Age: 35
End: 2020-10-23
Payer: COMMERCIAL

## 2020-10-23 DIAGNOSIS — Z20.822 CLOSE EXPOSURE TO COVID-19 VIRUS: ICD-10-CM

## 2020-10-23 DIAGNOSIS — R51.9 ACUTE NONINTRACTABLE HEADACHE, UNSPECIFIED HEADACHE TYPE: ICD-10-CM

## 2020-10-23 DIAGNOSIS — Z20.822 CLOSE EXPOSURE TO COVID-19 VIRUS: Primary | ICD-10-CM

## 2020-10-23 PROCEDURE — U0003 INFECTIOUS AGENT DETECTION BY NUCLEIC ACID (DNA OR RNA); SEVERE ACUTE RESPIRATORY SYNDROME CORONAVIRUS 2 (SARS-COV-2) (CORONAVIRUS DISEASE [COVID-19]), AMPLIFIED PROBE TECHNIQUE, MAKING USE OF HIGH THROUGHPUT TECHNOLOGIES AS DESCRIBED BY CMS-2020-01-R: HCPCS | Performed by: FAMILY MEDICINE

## 2020-10-23 PROCEDURE — 99213 OFFICE O/P EST LOW 20 MIN: CPT | Performed by: FAMILY MEDICINE

## 2020-10-24 LAB — SARS-COV-2 RNA SPEC QL NAA+PROBE: NOT DETECTED

## 2020-11-17 ENCOUNTER — ANNUAL EXAM (OUTPATIENT)
Dept: OBGYN CLINIC | Facility: CLINIC | Age: 35
End: 2020-11-17
Payer: COMMERCIAL

## 2020-11-17 VITALS
WEIGHT: 134.6 LBS | BODY MASS INDEX: 24.77 KG/M2 | HEIGHT: 62 IN | DIASTOLIC BLOOD PRESSURE: 78 MMHG | SYSTOLIC BLOOD PRESSURE: 128 MMHG | TEMPERATURE: 97.1 F

## 2020-11-17 DIAGNOSIS — Z12.4 SCREENING FOR CERVICAL CANCER: Primary | ICD-10-CM

## 2020-11-17 DIAGNOSIS — Z97.5 IUD CONTRACEPTION: ICD-10-CM

## 2020-11-17 DIAGNOSIS — N80.9 ENDOMETRIOSIS: ICD-10-CM

## 2020-11-17 DIAGNOSIS — Z11.51 SCREENING FOR HPV (HUMAN PAPILLOMAVIRUS): ICD-10-CM

## 2020-11-17 DIAGNOSIS — Z01.419 WOMEN'S ANNUAL ROUTINE GYNECOLOGICAL EXAMINATION: ICD-10-CM

## 2020-11-17 PROCEDURE — G0145 SCR C/V CYTO,THINLAYER,RESCR: HCPCS | Performed by: OBSTETRICS & GYNECOLOGY

## 2020-11-17 PROCEDURE — 3008F BODY MASS INDEX DOCD: CPT | Performed by: OBSTETRICS & GYNECOLOGY

## 2020-11-17 PROCEDURE — 1036F TOBACCO NON-USER: CPT | Performed by: OBSTETRICS & GYNECOLOGY

## 2020-11-17 PROCEDURE — 99395 PREV VISIT EST AGE 18-39: CPT | Performed by: OBSTETRICS & GYNECOLOGY

## 2020-11-17 PROCEDURE — 87624 HPV HI-RISK TYP POOLED RSLT: CPT | Performed by: OBSTETRICS & GYNECOLOGY

## 2020-11-17 RX ORDER — NORGESTIMATE AND ETHINYL ESTRADIOL 0.25-0.035
KIT ORAL
COMMUNITY
End: 2020-11-17

## 2020-11-20 LAB
HPV HR 12 DNA CVX QL NAA+PROBE: NEGATIVE
HPV16 DNA CVX QL NAA+PROBE: NEGATIVE
HPV18 DNA CVX QL NAA+PROBE: NEGATIVE
LAB AP GYN PRIMARY INTERPRETATION: NORMAL
Lab: NORMAL

## 2021-09-23 ENCOUNTER — APPOINTMENT (EMERGENCY)
Dept: ULTRASOUND IMAGING | Facility: HOSPITAL | Age: 36
End: 2021-09-23
Payer: COMMERCIAL

## 2021-09-23 ENCOUNTER — TELEPHONE (OUTPATIENT)
Dept: OBGYN CLINIC | Facility: CLINIC | Age: 36
End: 2021-09-23

## 2021-09-23 ENCOUNTER — HOSPITAL ENCOUNTER (EMERGENCY)
Facility: HOSPITAL | Age: 36
Discharge: HOME | End: 2021-09-23
Attending: EMERGENCY MEDICINE | Admitting: EMERGENCY MEDICINE
Payer: COMMERCIAL

## 2021-09-23 VITALS
OXYGEN SATURATION: 99 % | RESPIRATION RATE: 19 BRPM | DIASTOLIC BLOOD PRESSURE: 67 MMHG | TEMPERATURE: 98.2 F | SYSTOLIC BLOOD PRESSURE: 166 MMHG | WEIGHT: 126.76 LBS | HEART RATE: 99 BPM | BODY MASS INDEX: 23.19 KG/M2

## 2021-09-23 DIAGNOSIS — N83.201 RIGHT OVARIAN CYST: Primary | ICD-10-CM

## 2021-09-23 DIAGNOSIS — R10.2 PELVIC PAIN: ICD-10-CM

## 2021-09-23 LAB
ALBUMIN SERPL BCP-MCNC: 4 G/DL (ref 3.5–5)
ALP SERPL-CCNC: 70 U/L (ref 46–116)
ALT SERPL W P-5'-P-CCNC: 16 U/L (ref 12–78)
ANION GAP SERPL CALCULATED.3IONS-SCNC: 7 MMOL/L (ref 4–13)
AST SERPL W P-5'-P-CCNC: 11 U/L (ref 5–45)
BASOPHILS # BLD AUTO: 0.05 THOUSANDS/ΜL (ref 0–0.1)
BASOPHILS NFR BLD AUTO: 0 % (ref 0–1)
BILIRUB SERPL-MCNC: 0.2 MG/DL (ref 0.2–1)
BUN SERPL-MCNC: 14 MG/DL (ref 5–25)
CALCIUM SERPL-MCNC: 8.6 MG/DL (ref 8.3–10.1)
CHLORIDE SERPL-SCNC: 104 MMOL/L (ref 100–108)
CO2 SERPL-SCNC: 28 MMOL/L (ref 21–32)
CREAT SERPL-MCNC: 0.72 MG/DL (ref 0.6–1.3)
EOSINOPHIL # BLD AUTO: 0.08 THOUSAND/ΜL (ref 0–0.61)
EOSINOPHIL NFR BLD AUTO: 1 % (ref 0–6)
ERYTHROCYTE [DISTWIDTH] IN BLOOD BY AUTOMATED COUNT: 12.4 % (ref 11.6–15.1)
GFR SERPL CREATININE-BSD FRML MDRD: 110 ML/MIN/1.73SQ M
GLUCOSE SERPL-MCNC: 121 MG/DL (ref 65–140)
HCT VFR BLD AUTO: 37 % (ref 34.8–46.1)
HGB BLD-MCNC: 12.7 G/DL (ref 11.5–15.4)
IMM GRANULOCYTES # BLD AUTO: 0.05 THOUSAND/UL (ref 0–0.2)
IMM GRANULOCYTES NFR BLD AUTO: 0 % (ref 0–2)
LYMPHOCYTES # BLD AUTO: 2.02 THOUSANDS/ΜL (ref 0.6–4.47)
LYMPHOCYTES NFR BLD AUTO: 17 % (ref 14–44)
MCH RBC QN AUTO: 32.4 PG (ref 26.8–34.3)
MCHC RBC AUTO-ENTMCNC: 34.3 G/DL (ref 31.4–37.4)
MCV RBC AUTO: 94 FL (ref 82–98)
MONOCYTES # BLD AUTO: 0.61 THOUSAND/ΜL (ref 0.17–1.22)
MONOCYTES NFR BLD AUTO: 5 % (ref 4–12)
NEUTROPHILS # BLD AUTO: 8.96 THOUSANDS/ΜL (ref 1.85–7.62)
NEUTS SEG NFR BLD AUTO: 77 % (ref 43–75)
NRBC BLD AUTO-RTO: 0 /100 WBCS
PLATELET # BLD AUTO: 184 THOUSANDS/UL (ref 149–390)
PMV BLD AUTO: 11.3 FL (ref 8.9–12.7)
POTASSIUM SERPL-SCNC: 3.8 MMOL/L (ref 3.5–5.3)
PROT SERPL-MCNC: 7.1 G/DL (ref 6.4–8.2)
RBC # BLD AUTO: 3.92 MILLION/UL (ref 3.81–5.12)
SODIUM SERPL-SCNC: 139 MMOL/L (ref 136–145)
WBC # BLD AUTO: 11.77 THOUSAND/UL (ref 4.31–10.16)

## 2021-09-23 PROCEDURE — 36415 COLL VENOUS BLD VENIPUNCTURE: CPT | Performed by: EMERGENCY MEDICINE

## 2021-09-23 PROCEDURE — 99285 EMERGENCY DEPT VISIT HI MDM: CPT | Performed by: EMERGENCY MEDICINE

## 2021-09-23 PROCEDURE — NC001 PR NO CHARGE: Performed by: OBSTETRICS & GYNECOLOGY

## 2021-09-23 PROCEDURE — 80053 COMPREHEN METABOLIC PANEL: CPT | Performed by: EMERGENCY MEDICINE

## 2021-09-23 PROCEDURE — 96375 TX/PRO/DX INJ NEW DRUG ADDON: CPT

## 2021-09-23 PROCEDURE — 76830 TRANSVAGINAL US NON-OB: CPT

## 2021-09-23 PROCEDURE — 96361 HYDRATE IV INFUSION ADD-ON: CPT

## 2021-09-23 PROCEDURE — 99284 EMERGENCY DEPT VISIT MOD MDM: CPT

## 2021-09-23 PROCEDURE — 76856 US EXAM PELVIC COMPLETE: CPT

## 2021-09-23 PROCEDURE — 85025 COMPLETE CBC W/AUTO DIFF WBC: CPT | Performed by: EMERGENCY MEDICINE

## 2021-09-23 PROCEDURE — 96374 THER/PROPH/DIAG INJ IV PUSH: CPT

## 2021-09-23 RX ORDER — IBUPROFEN 600 MG/1
600 TABLET ORAL EVERY 6 HOURS PRN
Qty: 30 TABLET | Refills: 0 | Status: SHIPPED | OUTPATIENT
Start: 2021-09-23 | End: 2022-07-07

## 2021-09-23 RX ORDER — ONDANSETRON 2 MG/ML
4 INJECTION INTRAMUSCULAR; INTRAVENOUS ONCE
Status: DISCONTINUED | OUTPATIENT
Start: 2021-09-23 | End: 2021-09-23 | Stop reason: HOSPADM

## 2021-09-23 RX ORDER — HYDROMORPHONE HCL/PF 1 MG/ML
1 SYRINGE (ML) INJECTION ONCE
Status: COMPLETED | OUTPATIENT
Start: 2021-09-23 | End: 2021-09-23

## 2021-09-23 RX ORDER — OXYCODONE HYDROCHLORIDE AND ACETAMINOPHEN 5; 325 MG/1; MG/1
2 TABLET ORAL ONCE
Status: COMPLETED | OUTPATIENT
Start: 2021-09-23 | End: 2021-09-23

## 2021-09-23 RX ORDER — OXYCODONE HYDROCHLORIDE AND ACETAMINOPHEN 5; 325 MG/1; MG/1
1 TABLET ORAL EVERY 4 HOURS PRN
Qty: 20 TABLET | Refills: 0 | Status: SHIPPED | OUTPATIENT
Start: 2021-09-23 | End: 2021-10-03

## 2021-09-23 RX ORDER — KETOROLAC TROMETHAMINE 30 MG/ML
15 INJECTION, SOLUTION INTRAMUSCULAR; INTRAVENOUS ONCE
Status: COMPLETED | OUTPATIENT
Start: 2021-09-23 | End: 2021-09-23

## 2021-09-23 RX ORDER — ONDANSETRON 4 MG/1
4 TABLET, ORALLY DISINTEGRATING ORAL EVERY 6 HOURS PRN
Qty: 20 TABLET | Refills: 0 | Status: SHIPPED | OUTPATIENT
Start: 2021-09-23 | End: 2022-07-07

## 2021-09-23 RX ADMIN — HYDROMORPHONE HYDROCHLORIDE 1 MG: 1 INJECTION, SOLUTION INTRAMUSCULAR; INTRAVENOUS; SUBCUTANEOUS at 01:29

## 2021-09-23 RX ADMIN — OXYCODONE HYDROCHLORIDE AND ACETAMINOPHEN 2 TABLET: 5; 325 TABLET ORAL at 05:37

## 2021-09-23 RX ADMIN — KETOROLAC TROMETHAMINE 15 MG: 30 INJECTION, SOLUTION INTRAMUSCULAR; INTRAVENOUS at 02:51

## 2021-09-23 RX ADMIN — SODIUM CHLORIDE 1000 ML: 0.9 INJECTION, SOLUTION INTRAVENOUS at 01:29

## 2021-09-23 NOTE — ED NOTES
Called and spoke to [address] to come in for Pt       Avinash Albarado RN  09/23/21 8626

## 2021-09-23 NOTE — CONSULTS
Consultation - Gynecology  Odilia Diana 31 y o  female MRN: [id number]  Unit/Bed#: ED 17 Encounter: [id number]      Inpatient consult to Obstetrics / Gynecology  Consult performed by: Marilyn Glasgow MD  Consult ordered by: Callie Mckeon DO            Chief Complaint   Patient presents with    Pelvic Pain     right sided pelvic pain  c/o tenderness and pressure  History of Present Illness   Physician Requesting Consult: Callie Mckeon DO  Reason for Consult / Principal Problem: abdominal pain  Subspeciality: General GYN  HPI: Odilia Diana is a 31 y o  female who presents with abdominal pain that started last night  The patient reports that she had intercourse and went to sleep   2 hours later she woke up with a sharp stabbing pain in her abdomen  She then came into the ED where she was given pain medication, which per the patient has helped her pain  She now denies any pain in her right lower quadrant and states having some discomfort in the left lower quadrant and suprapubic area  She denies any nausea, vomiting, fever  Surgical hx: bilateral salpingectomy, left ovarian cystectomy  PMHx: endometriosis      Review of Systems   Constitutional: Negative for appetite change and fever  Respiratory: Negative for shortness of breath  Cardiovascular: Negative for chest pain  Gastrointestinal: Positive for abdominal distention and abdominal pain  Negative for nausea  Genitourinary: Positive for pelvic pain  Negative for menstrual problem, vaginal bleeding, vaginal discharge and vaginal pain         Historical Information   Past Medical History:   Diagnosis Date    Abnormal Pap smear of cervix     Adnexal fullness     Amenorrhea     Anxiety     Arthralgia of toe     Endometriosis 11/17/2020    Exposure to parvovirus     GERD (gastroesophageal reflux disease)     during pregnancy    HPV (human papilloma virus) infection     Nephrolithiasis     Umbilical hernia  Urinary tract infection      Past Surgical History:   Procedure Laterality Date    CERVICAL BIOPSY  W/ LOOP ELECTRODE EXCISION      COLPOSCOPY W/ BIOPSY / CURETTAGE      OSTEOTOMY AND ULNAR SHORTENING Right     [place] LAP,DIAGNOSTIC ABDOMEN Bilateral 2019    Procedure: DIAGNOSTIC LAP; BILATERAL SALPINGECTOMY; OVARIAN CYSTECTOMY, ECSISION PELVIC CYST;  Surgeon: Kleber Seth MD;  Location: [facility];  Service: Gynecology    TOOTH EXTRACTION      [place] teeth    WRIST SURGERY       OB History    Para Term  AB Living   2 2 2 0 0 2   SAB TAB Ectopic Multiple Live Births   0 0 0 0 2      # Outcome Date GA Lbr Kennedy/2nd Weight Sex Delivery Anes PTL Lv   2 Term 17 38w2d / 00:06 3515 g (7 lb 12 oz) M Vag-Spont None N YUSUF   1 Term              Family History   Problem Relation Age of Onset    Diabetes Maternal Grandmother     Heart disease Paternal Grandfather     Hypertension Paternal Grandfather     Hyperlipidemia Paternal Grandfather     Hypertension Father     Hyperlipidemia Father     Heart disease Maternal Grandfather     Diabetes Paternal Grandmother     Diabetes Family      Social History   Social History     Substance and Sexual Activity   Alcohol Use Yes    Comment: occasional     Social History     Substance and Sexual Activity   Drug Use No     Social History     Tobacco Use   Smoking Status Never Smoker   Smokeless Tobacco Never Used       Meds/Allergies   Current Facility-Administered Medications   Medication Dose Route Frequency    ondansetron (ZOFRAN) injection 4 mg  4 mg Intravenous Once         Allergies   Allergen Reactions    Pollen Extract     Nickel Rash     Some earrings will get rash and very sore       Objective   Vitals: Blood pressure 166/67, pulse 99, temperature 98 2 °F (36 8 °C), temperature source Oral, resp  rate 19, weight 57 5 kg (126 lb 12 2 oz), last menstrual period 2021, SpO2 99 %, not currently breastfeeding   Body mass index is 23 19 kg/m²  Intake/Output Summary (Last 24 hours) at 9/23/2021 0444  Last data filed at 9/23/2021 0257  Gross per 24 hour   Intake 1000 ml   Output --   Net 1000 ml       Invasive Devices     Peripheral Intravenous Line            Peripheral IV 09/23/21 Left Antecubital <1 day                Physical Exam  Constitutional:       Appearance: Normal appearance  HENT:      Head: Normocephalic and atraumatic  Eyes:      Extraocular Movements: Extraocular movements intact  Pulmonary:      Effort: Pulmonary effort is normal    Abdominal:      General: There is no distension  Palpations: Abdomen is soft  There is no mass  Tenderness: There is no guarding  Comments: Moderate pain to palpation in the suprapubic are and LLQ   Musculoskeletal:         General: Normal range of motion  Skin:     General: Skin is warm and dry  Neurological:      Mental Status: She is alert and oriented to person, place, and time     Psychiatric:         Mood and Affect: Mood normal          Behavior: Behavior normal          Lab Results:   Recent Results (from the past 24 hour(s))   CBC and differential    Collection Time: 09/23/21  1:36 AM   Result Value Ref Range    WBC 11 77 (H) 4 31 - 10 16 Thousand/uL    RBC 3 92 3 81 - 5 12 Million/uL    Hemoglobin 12 7 11 5 - 15 4 g/dL    Hematocrit 37 0 34 8 - 46 1 %    MCV 94 82 - 98 fL    MCH 32 4 26 8 - 34 3 pg    MCHC 34 3 31 4 - 37 4 g/dL    RDW 12 4 11 6 - 15 1 %    MPV 11 3 8 9 - 12 7 fL    Platelets 477 188 - 828 Thousands/uL    nRBC 0 /100 WBCs    Neutrophils Relative 77 (H) 43 - 75 %    Immat GRANS % 0 0 - 2 %    Lymphocytes Relative 17 14 - 44 %    Monocytes Relative 5 4 - 12 %    Eosinophils Relative 1 0 - 6 %    Basophils Relative 0 0 - 1 %    Neutrophils Absolute 8 96 (H) 1 85 - 7 62 Thousands/µL    Immature Grans Absolute 0 05 0 00 - 0 20 Thousand/uL    Lymphocytes Absolute 2 02 0 60 - 4 47 Thousands/µL    Monocytes Absolute 0 61 0 17 - 1 22 Thousand/µL    Eosinophils Absolute 0 08 0 00 - 0 61 Thousand/µL    Basophils Absolute 0 05 0 00 - 0 10 Thousands/µL   Comprehensive metabolic panel    Collection Time: 09/23/21  1:36 AM   Result Value Ref Range    Sodium 139 136 - 145 mmol/L    Potassium 3 8 3 5 - 5 3 mmol/L    Chloride 104 100 - 108 mmol/L    CO2 28 21 - 32 mmol/L    ANION GAP 7 4 - 13 mmol/L    BUN 14 5 - 25 mg/dL    Creatinine 0 72 0 60 - 1 30 mg/dL    Glucose 121 65 - 140 mg/dL    Calcium 8 6 8 3 - 10 1 mg/dL    AST 11 5 - 45 U/L    ALT 16 12 - 78 U/L    Alkaline Phosphatase 70 46 - 116 U/L    Total Protein 7 1 6 4 - 8 2 g/dL    Albumin 4 0 3 5 - 5 0 g/dL    Total Bilirubin 0 20 0 20 - 1 00 mg/dL    eGFR 110 ml/min/1 73sq m       Imaging:   TVUS  16 mm involuting right ovarian cyst with minimally complex pelvic free fluid  Although positive Doppler flow is noted within both ovaries, the right ovary is enlarged when compared to the left  Given the enlarged right ovary, intermittent ovarian torsion cannot be excluded on this study  Assessment/Plan     Assessment:  37yo presenting to the ED with lower abdominal pain, concerning for possible intermittent ovarian torsion, ruled out for surgical abdomen  Plan:  F/u in the office in the next few days  Pt to be discharged home with pain medication per ED  Discussed precautions and return to ED if pain worsens    Counseling / Coordination of Care  Total floor / unit time spent today45 minutes  minutes  Greater than 50% of total time was spent with the patient and / or family counseling and / or coordination of care       Antoni Warner MD  9/23/2021  4:44 AM

## 2021-09-27 ENCOUNTER — OFFICE VISIT (OUTPATIENT)
Dept: OBGYN CLINIC | Facility: CLINIC | Age: 36
End: 2021-09-27
Payer: COMMERCIAL

## 2021-09-27 VITALS — WEIGHT: 126 LBS | BODY MASS INDEX: 23.05 KG/M2 | SYSTOLIC BLOOD PRESSURE: 118 MMHG | DIASTOLIC BLOOD PRESSURE: 80 MMHG

## 2021-09-27 DIAGNOSIS — Z97.5 IUD CONTRACEPTION: ICD-10-CM

## 2021-09-27 DIAGNOSIS — N83.209 CYST OF OVARY, UNSPECIFIED LATERALITY: ICD-10-CM

## 2021-09-27 DIAGNOSIS — R10.2 PELVIC PAIN: Primary | ICD-10-CM

## 2021-09-27 PROCEDURE — 99213 OFFICE O/P EST LOW 20 MIN: CPT | Performed by: OBSTETRICS & GYNECOLOGY

## 2021-09-27 PROCEDURE — 1036F TOBACCO NON-USER: CPT | Performed by: OBSTETRICS & GYNECOLOGY

## 2021-09-27 NOTE — PROGRESS NOTES
Assessment/Plan   Diagnoses and all orders for this visit:    Pelvic pain    IUD contraception    Cyst of ovary, unspecified laterality    1  Episode of pelvic pain-woke up on 9/22/21 with severe 10/10 pelvic pain  Seen in the emergency room, had 16 millimeter right ovarian cyst with small amount of free fluid noted on ultrasound  IUD was in good position  She noted pain through 9/24 and now it has resolved since 09/25  Exam today is unremarkable  She will call or return with any recurrent symptoms  2  Mirena IUD-in good position on exam and ultrasound  3  Endometriosis-noted at laparoscopy with endometriosis type tissue at the left ovary during cystectomy  Continues with Mirena IUD for treatment of this  4  Status post laparoscopic bilateral salpingectomy for contraception  5  History of severe dysplasia-LEEP cone biopsy 10/24/17  Testing has been negative since that time  6  History of condyloma-treated 12/2/19 with prompt resolution  7  Other-patient was on NuvaRing and OCP in the past   She did not have the same type of pain with cyst like she does have on the IUD  Likely, the NuvaRing and OCP are better at suppressing hormonal fluctuation and corpus luteum cyst formation  Patient is concerned that the IUD is causing the cysts"  While cyst have been described in association with IUD, I am not sure that the Mirena is causing the cysts  It probably is not suppressing the menstrual cycle and ovulation quite as well  She will consider if she wants to continue with this device  Overall her menstrual cycles are much improved with lighter flow and dysmenorrhea is not bad  We will discuss at the follow-up visit  She has yearly exam November 2021  Subjective   Patient ID: Soniya Gonzalez is a 31 y o  female  Vitals:    09/27/21 1520   BP: 118/80     Patient was seen today for follow-up visit  Please see assessment plan for details        The following portions of the patient's history were reviewed and updated as appropriate: allergies, current medications, past family history, past medical history, past social history, past surgical history and problem list   Past Medical History:   Diagnosis Date    Abnormal Pap smear of cervix     Adnexal fullness     Amenorrhea     Anxiety     Arthralgia of toe     Endometriosis 2020    Exposure to parvovirus     GERD (gastroesophageal reflux disease)     during pregnancy    HPV (human papilloma virus) infection     Nephrolithiasis     Umbilical hernia     Urinary tract infection      Past Surgical History:   Procedure Laterality Date    CERVICAL BIOPSY  W/ LOOP ELECTRODE EXCISION      COLPOSCOPY W/ BIOPSY / CURETTAGE      OSTEOTOMY AND ULNAR SHORTENING Right     PA LAP,DIAGNOSTIC ABDOMEN Bilateral 2019    Procedure: DIAGNOSTIC LAP; BILATERAL SALPINGECTOMY; OVARIAN CYSTECTOMY, ECSISION PELVIC CYST;  Surgeon: Mitesh Vasquez MD;  Location: [facility];  Service: Gynecology    TOOTH EXTRACTION      Waldo teeth    WRIST SURGERY       OB History    Para Term  AB Living   2 2 2 0 0 2   SAB TAB Ectopic Multiple Live Births   0 0 0 0 2      # Outcome Date GA Lbr Kennedy/2nd Weight Sex Delivery Anes PTL Lv   2 Term 17 38w2d / 00:06 3515 g (7 lb 12 oz) M Vag-Spont None N YUSUF   1 Term                Current Outpatient Medications:     ibuprofen (MOTRIN) 600 mg tablet, Take 1 tablet (600 mg total) by mouth every 6 (six) hours as needed for moderate pain, Disp: 30 tablet, Rfl: 0    levonorgestrel (MIRENA) 20 MCG/24HR IUD, 1 each by Intrauterine route once , Disp: , Rfl:     naproxen (NAPROSYN) 500 mg tablet, Take 1 tablet (500 mg total) by mouth 2 (two) times a day as needed for mild pain or moderate pain Take with food, Disp: 60 tablet, Rfl: 5    ondansetron (ZOFRAN-ODT) 4 mg disintegrating tablet, Take 1 tablet (4 mg total) by mouth every 6 (six) hours as needed for nausea or vomiting, Disp: 20 tablet, Rfl: 0   oxyCODONE-acetaminophen (PERCOCET) 5-325 mg per tablet, Take 1 tablet by mouth every 4 (four) hours as needed for moderate pain for up to 10 daysMax Daily Amount: 6 tablets, Disp: 20 tablet, Rfl: 0  Allergies   Allergen Reactions    Pollen Extract     Nickel Rash     Some earrings will get rash and very sore     Social History     Socioeconomic History    Marital status: /Civil Union     Spouse name: Not on file    Number of children: Not on file    Years of education: Not on file    Highest education level: Not on file   Occupational History    Not on file   Tobacco Use    Smoking status: Never Smoker    Smokeless tobacco: Never Used   Vaping Use    Vaping Use: Never used   Substance and Sexual Activity    Alcohol use: Yes     Comment: occasional    Drug use: No    Sexual activity: Yes     Partners: Male   Other Topics Concern    Not on file   Social History Narrative    Caffeine use    Contraceptive vaginal ring    One child    Pentecostal affiliation- Gnosticism     uses safety equipment- seatbelt     Social Determinants of Health     Financial Resource Strain:     Difficulty of Paying Living Expenses:    Food Insecurity:     Worried About Running Out of Food in the Last Year:     Ran Out of Food in the Last Year:    Transportation Needs:     Lack of Transportation (Medical):      Lack of Transportation (Non-Medical):    Physical Activity:     Days of Exercise per Week:     Minutes of Exercise per Session:    Stress:     Feeling of Stress :    Social Connections:     Frequency of Communication with Friends and Family:     Frequency of Social Gatherings with Friends and Family:     Attends Pentecostal Services:     Active Member of Clubs or Organizations:     Attends Club or Organization Meetings:     Marital Status:    Intimate Partner Violence:     Fear of Current or Ex-Partner:     Emotionally Abused:     Physically Abused:     Sexually Abused:      Family History   Problem Relation Age of Onset    Diabetes Maternal Grandmother     Heart disease Paternal Grandfather     Hypertension Paternal Grandfather     Hyperlipidemia Paternal Grandfather     Hypertension Father     Hyperlipidemia Father     Heart disease Maternal Grandfather     Diabetes Paternal Grandmother     Diabetes Family        Review of Systems   Constitutional: Negative for chills, diaphoresis, fatigue and fever  Respiratory: Negative for apnea, cough, chest tightness, shortness of breath and wheezing  Cardiovascular: Negative for chest pain, palpitations and leg swelling  Gastrointestinal: Negative for abdominal distention, abdominal pain, anal bleeding, constipation, diarrhea, nausea, rectal pain and vomiting  Genitourinary: Positive for pelvic pain  Negative for difficulty urinating, dyspareunia, dysuria, frequency, hematuria, menstrual problem, urgency, vaginal bleeding, vaginal discharge and vaginal pain  Musculoskeletal: Negative for arthralgias, back pain and myalgias  Skin: Negative for color change and rash  Neurological: Negative for dizziness, syncope, light-headedness, numbness and headaches  Hematological: Negative for adenopathy  Does not bruise/bleed easily  Psychiatric/Behavioral: Negative for dysphoric mood and sleep disturbance  The patient is not nervous/anxious  Objective   Physical Exam  OBGyn Exam     Objective      /80 (BP Location: Left arm, Patient Position: Sitting, Cuff Size: Adult)   Wt 57 2 kg (126 lb)   LMP 09/02/2021 (Approximate)   BMI 23 05 kg/m²     General:   alert and oriented, in no acute distress   Neck:    Breast:    Heart:    Lungs:    Abdomen: soft, non-tender, without masses or organomegaly   Vulva: normal   Vagina: Without erythema or lesions or discharge  Normal   Cervix: Without lesions or discharge or cervicitis  No Cervical motion tenderness    IUD string seen at a length of 1-1 5 centimeters   Uterus: top normal size, anteverted, non-tender   Adnexa: no mass, fullness, tenderness   Rectum: negative    Psych:  Normal mood and affect   Skin:  Without obvious lesions   Eyes: symmetric, with normal movements and reactivity   Musculoskeletal:  Normal muscle tone and movements appreciated

## 2021-09-27 NOTE — PATIENT INSTRUCTIONS
Endometriosis   WHAT YOU NEED TO KNOW:   What is endometriosis? Endometriosis is a condition in which tissue that is normally only in your uterus grows outside of the uterus  Endometriosis causes tissue that should be shed during a monthly period to grow on your ovaries, fallopian tubes, bladder, or other organs  Organs and tissue may stick together and cause inflammation and pain  What increases my risk for endometriosis? The cause of endometriosis may not be known  Any of the following may increase your risk:  · Monthly period that started early    · Older than 35 when you had your first child    · Menopause after age 54    · Born with a narrow cervix or vagina, or no opening of your cervix or vagina    · Family history of endometriosis    · A weak immune system    What are the signs and symptoms of endometriosis? · Abdominal pain or nausea and vomiting before or during your period    · Painful periods     · Feeling full or bloated    · Dizziness or fatigue    · Heavy periods, or vaginal bleeding at times other than during your monthly period    · Infertility (being unable to get pregnant)    · Lower back pain or painful bowel movements during your monthly periods    · Pain during or after sex    · Pain when you urinate    How is endometriosis diagnosed? Your healthcare provider will examine you and ask you questions about other medical conditions you may have  He or she may ask about your menstrual history, pregnancies, and if you have a family member with endometriosis  You may also have one or more of the following tests:  · A blood test  can check for pregnancy, sexually transmitted infection, and anemia from blood loss  · A pelvic exam  may be needed to check the size and shape of your uterus, cervix, and ovaries  This may also help to find areas of endometriosis  · A vaginal ultrasound  uses sound waves to show pictures of your uterus and ovaries on a monitor   An ultrasound may be done to show endometriosis  · A CT or MRI  may show the endometriosis  You may be given contrast liquid to help your abdomen show up better in the pictures  Tell the healthcare provider if you have ever had an allergic reaction to contrast liquid  Do not enter the MRI room with anything metal  Metal can cause serious injury  Tell the healthcare provider if you have any metal in or on your body  · Laparoscopy  is a procedure used to look inside your abdomen  A piece of tissue may be removed from your ovaries, fallopian tubes, bowels, or other organs  The tissues are sent to a lab for tests to see if endometriosis is present  How is endometriosis treated? · Medicines:      ? Hormones  may help shrink endometrial tissue and decrease pain and inflammation  You may be given birth control pills, androgen hormones, or medicine that makes your body produce less of certain hormones  ? Acetaminophen  decreases pain and is available without a doctor's order  Ask how much to take and how often to take it  Follow directions  Acetaminophen can cause liver damage if not taken correctly  ? NSAIDs , such as ibuprofen, help decrease swelling, pain, and fever  This medicine is available with or without a doctor's order  NSAIDs can cause stomach bleeding or kidney problems in certain people  If you take blood thinner medicine, always ask your healthcare provider if NSAIDs are safe for you  Always read the medicine label and follow directions  · Surgery  may be needed to determine if you have endometriosis  Endometrial tissue that is growing in the wrong places may be removed  How can I manage my symptoms? · Apply heat  on your abdomen for 20 to 30 minutes every 2 hours for as many days as directed  Heat helps decrease pain and muscle spasms  · Exercise regularly  to help reduce symptoms, such as pain  Ask about the best exercise plan for you  Where can I find more information?    · The [address] [facility]  [address] [place] [address]  Phone: [phone]  Phone: [phone]  Web Address: [email or web address]    When should I seek immediate care? · You have severe pain that does not go away after you take pain medicine  When should I contact my healthcare provider? · Your symptoms return after treatment  · You have heavy or unusual vaginal bleeding  · You see blood in your urine or bowel movement  · You have questions or concerns about your condition or care  CARE AGREEMENT:   You have the right to help plan your care  Learn about your health condition and how it may be treated  Discuss treatment options with your healthcare providers to decide what care you want to receive  You always have the right to refuse treatment  The above information is an  only  It is not intended as medical advice for individual conditions or treatments  Talk to your doctor, nurse or pharmacist before following any medical regimen to see if it is safe and effective for you  © Copyright [employer] 2021 Information is for End User's use only and may not be sold, redistributed or otherwise used for commercial purposes  All illustrations and images included in CareNotes® are the copyrighted property of [employer]  or [employer]  Intrauterine Device   WHAT YOU NEED TO KNOW:   What is an intrauterine device (IUD)? An IUD is a type of birth control that is inserted into your uterus  It is a small, flexible piece of plastic with a string on the end  It is inserted and removed by your healthcare provider  IUDs prevent sperm from reaching or fertilizing an egg  IUDs also prevent a fertilized egg from attaching to the uterus and developing into a fetus  What are the most common types of IUDs? Your healthcare provider will recommend the type of IUD that is right for you  This is based on your age and if you have had a child   If you have not had a child, a smaller IUD will be used  · A copper IUD  slowly releases a small amount of copper into your uterus  This IUD can remain in place for up to 10 years  · A hormone-releasing IUD  slowly releases a small amount of progesterone into your uterus  Progesterone is a hormone that is made by your body to help control your periods  This IUD can remain in place for 3 to 5 years  What are the advantages of an IUD? · An IUD is 98% to 99% effective in preventing pregnancy  · The IUD can be removed by your healthcare provider if you decide to have a baby  You may be able to get pregnant as soon as the IUD is removed  · An IUD protects you from pregnancy right after it is inserted  · You do not have to stop sexual activity to insert it  You do not have to remember to take your birth control pill  · Copper IUDs are safer for some women than oral birth control pills  Examples include women who smoke or have a history of blood clots  · Hormone-releasing IUDs may decrease certain health problems  Examples include bleeding and cramping that happen with your monthly period  What are the disadvantages of an IUD? · There is a small chance that you could get pregnant  Sometimes the IUD cannot be removed after you get pregnant  This increases your risk of a miscarriage or an ectopic pregnancy  Ectopic pregnancy is when the fertilized egg starts to grow somewhere other than your uterus  · An IUD does not protect you from sexually transmitted infections  · You may have cramps during the first weeks after you get the IUD  · A copper IUD may cause your monthly period to be heavier or more painful  This is more common within the first 3 months after you get the IUD  You may need to have your IUD removed if your bleeding or pain becomes severe  You may have spotting between periods  · There is a small risk of an infection within the first 20 days after the IUD is placed   Infection can lead to pelvic inflammatory disease  This can cause infertility  · Your uterus may tear when the IUD is inserted  The IUD may slip part or all of the way out of your uterus  How is the IUD inserted? · The IUD is usually inserted during your monthly period  This may help decrease the amount of discomfort you have during the procedure  It also helps make sure that you are not pregnant  You will be asked to lie down and place your feet in stirrups  Your healthcare provider will gently insert a speculum into your vagina  This is the same tool used during a Pap smear  The speculum allows your healthcare provider to see inside your vagina to your cervix  The cervix is the opening of your uterus  · Your healthcare provider will clean your cervix with an antiseptic solution to prevent infection  You may be given numbing medicine  A long plastic tube is gently passed through your cervix and into your uterus  This tube has the IUD inside of it  The IUD is pushed out of the tube and into your uterus  You may have cramps as the IUD is inserted  The tube is removed after the IUD is in place  How can I make sure my IUD is still in place? An IUD has a string made of plastic thread  One to 2 inches of this string hangs into your vagina  You cannot see this string, and it should not cause problems when you have sex  Check your IUD string every 3 days for the first 3 months after it is inserted  After that, check the string after each monthly period  Do the following to check the placement of your IUD:  · Wash your hands with soap and warm water  Dry them with a clean towel  · Bend your knees and squat low to the ground  · Gently put your index finger inside your vagina  The cervix is at the top of the vagina and feels like the tip of your nose  Feel for the IUD string  Do not pull on the string  You should not be able to feel the firm plastic of the IUD itself      · Wash your hands after you check your IUD string  Where can I find more information? · [facility] [address] , [address] [place]  Phone: [phone]  Web Address: [email or web address]    When should I seek immediate care? · You have severe pain or bleeding during your period  · You have a fever and severe abdominal pain  When should I call my doctor? · You think you are pregnant  · The IUD has come out  · You have bleeding from your vagina after you have sex, and it is not your period  · You have pain during sex  · You cannot feel the IUD string, the string feels longer, or you feel the plastic of the IUD itself  · You have vaginal discharge that is green, yellow, or has a foul odor  · You have questions or concerns about your condition or care  CARE AGREEMENT:   You have the right to help plan your care  Learn about your health condition and how it may be treated  Discuss treatment options with your healthcare providers to decide what care you want to receive  You always have the right to refuse treatment  The above information is an  only  It is not intended as medical advice for individual conditions or treatments  Talk to your doctor, nurse or pharmacist before following any medical regimen to see if it is safe and effective for you  © Copyright [employer] 2021 Information is for End User's use only and may not be sold, redistributed or otherwise used for commercial purposes   All illustrations and images included in CareNotes® are the copyrighted property of [employer]  or [facility] [employer]

## 2021-11-17 ENCOUNTER — TELEPHONE (OUTPATIENT)
Dept: OTHER | Facility: OTHER | Age: 36
End: 2021-11-17

## 2022-02-11 NOTE — PROCEDURES
Assessment   1  Encounter for insertion of mirena IUD (V25 11) (Z30 430)    Plan   Encounter for insertion of mirena IUD    · Mirena (52 MG) 20 MCG/24HR Intrauterine Intrauterine Device   For: Encounter for insertion of mirena IUD; Dose of 52 MG; Intrauterine; EDUARDO = N; Administered by: Jason OATES D : 12/26/2017 12:00:00 AM; Last Updated By: Webb Goldmann; 12/26/2017 8:57:37 AM   · Follow-up visit in 1 month Evaluation and Treatment  Follow-up  Status: Hold For -    Scheduling  Requested for: [id number]   Ordered; For: Encounter for insertion of mirena IUD; Ordered By: Jason Gallegos Performed:  Due: [id number]    Discussion/Summary   Discussion Summary:    1  Mirena IUD insertion-done without problems  Patient tolerated the procedure well  Pelvic rest was recommended for the next 5-7 days  She will follow up in 1 month time for IUD check  Of note, she continued the NuvaRing continuously until IUD insertion today  Moderate to severe dysplasia/ Pap with ascus can't rule out high-grade JATIN with atypical glands cellsâcolposcopy was done during pregnancy with mild changes noted at 12 to 1:00 and 6 to 7:00 with acetowhite and iodine negative changes  No suspicion for high-grade changes were appreciated  Given the findings, repeat colposcopy was done 9/13/17 with ECC and biopsies at 12:00, 6:00, and 10:00, with negative ECC and dysplastic changes at 10:00  JUDE-1 was noted at 12 and JUDE-2 to 3 was noted at 6  LEEP cone biopsy was done with JUDE 3 noted with negative margins  The patient was relieved to hear this  She has healed well  She can proceed with activity as tolerated History of Pelvic discomfort/right adnexal tendernessâ resolved Otherâpatient does not plan any future pregnancy  She did note some slight irregular bleeding after the LEEP, but this is to be expected  She denies any further menometrorrhagia  follow-up in 1 month time for IUD check      Medication SE Review and Pt Understands Tx: Possible side [facility]  Initial Discharge Assessment       Primary Care Provider: Khadar Dwyer MD    Admission Diagnosis: Unstable angina [I20.0]  Coronary artery disease, unspecified vessel or lesion type, unspecified whether angina present, unspecified whether native or transplanted heart [I25.10]  Coronary artery disease involving native coronary artery without angina pectoris, unspecified whether native or transplanted heart [I25.10]    Admission Date: 2/10/2022  Expected Discharge Date: 2/11/2022            Discharge planning assessment completed per record review. Patient was transferred to [facility] before assessment could be completed.        Discharge Barriers Identified: None    Payor: MEDICARE / Plan: MEDICARE PART A & B / Product Type: Government /     Extended Emergency Contact Information  Primary Emergency Contact: Marisol Kerr  Address: [address] [place] [address] [place] [address] [place]  Mobile Phone: [phone]  Relation: Daughter  Preferred language: English   needed? No    Discharge Plan A: Other ([facility])  Discharge Plan B: Other ([facility])      [facility] [place] - [address]  [facility] [place] [address]  Phone: [phone] Fax: [phone]    [address] [facility] [address] [address]  Phone: [phone] Fax: [phone]      Initial Assessment (most recent)     Adult Discharge Assessment - 02/11/22 1507        Discharge Assessment    Source of Information health record     When was your last doctors appointment? 01/27/22     Reason For Admission Acute on chronic respiratory failure     Lives With alone     Prior to hospitilization cognitive status: Alert/Oriented     Current cognitive status: Alert/Oriented     Walking or Climbing Stairs Difficulty ambulation difficulty, requires equipment;transferring difficulty, requires equipment     Dressing/Bathing Difficulty  bathing difficulty, requires equipment     Readmission within 30 days? No     Do you take prescription medications? Yes     Do you have prescription coverage? Yes     Coverage Medicaid     Do you have any problems affording any of your prescribed medications? No     Are you on dialysis? No     Do you take coumadin? No     Discharge Plan A Other   [facility]    Discharge Plan B Other   [facility]    Discharge Barriers Identified None                           effects of new medications were reviewed with the patient/guardian today  The treatment plan was reviewed with the patient/guardian  The patient/guardian understands and agrees with the treatment plan    Goals and Barriers: The patient has the current Goals: Reviewed  The patent has the current Barriers: None  Patient's Capacity to Self-Care: Patient is able to Self-Care  Active Problems   1  Atypical glandular cells of undetermined significance (ARNULFO) on cervical Pap smear     (795 00) (R87 619)   2  Birth control counseling (V25 09) (Z30 09)   3  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)   4  Gastroesophageal reflux in pregnancy (646 80,530 81) (O99 619,K21 9)   5  Generalized anxiety disorder (300 02) (F41 1)   6  Pap smear of cervix with ASCUS, cannot exclude HGSIL (795 02) (R87 611)    Current Meds   1  NuvaRing 0 12-0 015 MG/24HR Vaginal Ring; insert 1 ring vaginally for 3 weeks; Therapy: [id number] to (Orin Johnson)  Requested for: [id number]; Last     Rx:[id number] Ordered    Allergies   1  No Known Drug Allergies  2  Seasonal    Physical Exam        Constitutional      General appearance: No acute distress, well appearing and well nourished  Genitourinary      External genitalia: Normal and no lesions appreciated  Vagina: Normal, no lesions or dryness appreciated  Urethra: Normal        Urethral meatus: Normal        Bladder: Normal, soft, non-tender and no prolapse or masses appreciated  Cervix: Normal, no palpable masses  -- Well healed with slight indentation noted at 11/12 o'clock  Scant amount of blood noted at the os  Uterus: Normal, non-tender, not enlarged, and no palpable masses  -- Anteverted, top-normal size, nontender, without mass  Adnexa/parametria: Normal, non-tender and no fullness or masses appreciated  Abdomen      Abdomen: Normal, non-tender, and no organomegaly noted  Liver and spleen: No hepatomegaly or splenomegaly  Examination for hernias: No hernias appreciated  Psychiatric      Orientation to person, place, and time: Normal        Mood and affect: Normal        Procedure        Procedure: intrauterine device (IUD) placement  Risks, benefits and alternatives were discussed with the patient  We discussed possible complications and risks, including infection,-- bleeding,-- pelvic pain,-- expulsion,-- failure,-- uterine perforation-- and-- increased risk of ectopic should pregnancy occur  written consent was obtained prior to the procedure and is detailed in the patient's record  Prior to the start of the procedure a time out was taken and the identity of the patient was confirmed via name and date of birth with the patient  The correct procedure to be performed were confirmed  The positioning of the patient was verified  The availability of the correct equipment was verified  the patient's LMP was 11/15/17-- and-- a pregnancy test was performed and confirmed negative  Procedure Note:      Anesthesia: none  The cervix was prepped with betadine  The cervix was stabilized with a single toothed tenaculum  The cervix allowed easy passage of a uterine sound without dilation  The uterus was anteverted-- and-- sounded to 7 5 cm  The Mirena IUD was gently inserted to the fundus of the uterus using standard technique  Strings were cut to 3 cm  Post-Procedure:      Patient Status: the patient tolerated the procedure well  Complications: there were no complications  Follow up: return for follow up visit in 1-2 months        Signatures    Electronically signed by : LASHON Starr ; Dec 26 2017  9:04AM EST                       (Author)

## 2022-07-07 ENCOUNTER — OFFICE VISIT (OUTPATIENT)
Dept: FAMILY MEDICINE CLINIC | Facility: CLINIC | Age: 37
End: 2022-07-07
Payer: COMMERCIAL

## 2022-07-07 VITALS
HEIGHT: 64 IN | RESPIRATION RATE: 16 BRPM | TEMPERATURE: 97.9 F | BODY MASS INDEX: 21 KG/M2 | OXYGEN SATURATION: 99 % | SYSTOLIC BLOOD PRESSURE: 112 MMHG | HEART RATE: 72 BPM | WEIGHT: 123 LBS | DIASTOLIC BLOOD PRESSURE: 78 MMHG

## 2022-07-07 DIAGNOSIS — Z00.00 PHYSICAL EXAM: Primary | ICD-10-CM

## 2022-07-07 PROCEDURE — 99395 PREV VISIT EST AGE 18-39: CPT | Performed by: FAMILY MEDICINE

## 2022-07-07 PROCEDURE — 3725F SCREEN DEPRESSION PERFORMED: CPT | Performed by: FAMILY MEDICINE

## 2022-07-07 NOTE — PROGRESS NOTES
Assessment/Plan:  Form completed for physical exam   Patient will check with her employer to see if she need a TB skin test   Discussed proper nutrition and regular exercise  Diagnoses and all orders for this visit:    Physical exam          Subjective:      Patient ID: Joey Hannon is a 31 y o  female  Patient is here for pre-employment physical exam to work as a  at [employer]  Patient previously worked up [employer] for 11 years  Patient is unsure if she needs a TB skin test   Patient has been feeling well overall  Past medical history reviewed on form  The following portions of the patient's history were reviewed and updated as appropriate: allergies, current medications, past family history, past medical history, past social history, past surgical history and problem list     Review of Systems      Objective:      /78   Pulse 72   Temp 97 9 °F (36 6 °C) (Skin)   Resp 16   Ht 5' 4" (1 626 m)   Wt 55 8 kg (123 lb)   SpO2 99%   BMI 21 11 kg/m²          Physical Exam  Constitutional:       General: She is not in acute distress  Appearance: Normal appearance  HENT:      Head: Normocephalic  Right Ear: Tympanic membrane normal       Left Ear: Tympanic membrane normal       Nose: Nose normal       Mouth/Throat:      Mouth: Mucous membranes are moist       Pharynx: Oropharynx is clear  Eyes:      General: No scleral icterus  Extraocular Movements: Extraocular movements intact  Conjunctiva/sclera: Conjunctivae normal       Pupils: Pupils are equal, round, and reactive to light  Cardiovascular:      Rate and Rhythm: Normal rate and regular rhythm  Pulmonary:      Effort: Pulmonary effort is normal       Breath sounds: Normal breath sounds  Abdominal:      Palpations: Abdomen is soft  Tenderness: There is no abdominal tenderness  Musculoskeletal:      Cervical back: Neck supple        Right lower leg: No edema  Left lower leg: No edema  Lymphadenopathy:      Cervical: No cervical adenopathy  Skin:     General: Skin is warm and dry  Neurological:      General: No focal deficit present  Mental Status: She is alert and oriented to person, place, and time  Psychiatric:         Mood and Affect: Mood normal          Behavior: Behavior normal          Thought Content:  Thought content normal          Judgment: Judgment normal

## 2022-07-19 ENCOUNTER — CLINICAL SUPPORT (OUTPATIENT)
Dept: FAMILY MEDICINE CLINIC | Facility: CLINIC | Age: 37
End: 2022-07-19
Payer: COMMERCIAL

## 2022-07-19 DIAGNOSIS — Z11.1 PPD SCREENING TEST: Primary | ICD-10-CM

## 2022-07-19 PROCEDURE — 86580 TB INTRADERMAL TEST: CPT

## 2022-07-21 ENCOUNTER — CLINICAL SUPPORT (OUTPATIENT)
Dept: FAMILY MEDICINE CLINIC | Facility: CLINIC | Age: 37
End: 2022-07-21

## 2022-07-21 DIAGNOSIS — Z11.1 ENCOUNTER FOR PPD SKIN TEST READING: Primary | ICD-10-CM

## 2022-07-21 LAB
INDURATION: 0 MM
TB SKIN TEST: NEGATIVE

## 2022-07-21 NOTE — PROGRESS NOTES
PPD Reading Note  PPD read and results entered in [address]  Result: 0 mm induration    Interpretation: Negative   If test not read within 48-72 hours of initial placement, patient advised to repeat in other arm 1-3 weeks after this test   Allergic reaction: no

## 2022-12-13 ENCOUNTER — OFFICE VISIT (OUTPATIENT)
Dept: FAMILY MEDICINE CLINIC | Facility: CLINIC | Age: 37
End: 2022-12-13

## 2022-12-13 VITALS
TEMPERATURE: 97 F | SYSTOLIC BLOOD PRESSURE: 102 MMHG | HEIGHT: 62 IN | DIASTOLIC BLOOD PRESSURE: 62 MMHG | HEART RATE: 84 BPM | RESPIRATION RATE: 16 BRPM | WEIGHT: 128.8 LBS | BODY MASS INDEX: 23.7 KG/M2 | OXYGEN SATURATION: 99 %

## 2022-12-13 DIAGNOSIS — R10.12 LEFT UPPER QUADRANT ABDOMINAL PAIN: Primary | ICD-10-CM

## 2022-12-13 DIAGNOSIS — R82.90 ABNORMAL URINE ODOR: ICD-10-CM

## 2022-12-13 LAB
SL AMB  POCT GLUCOSE, UA: NORMAL
SL AMB LEUKOCYTE ESTERASE,UA: ABNORMAL
SL AMB POCT BILIRUBIN,UA: NEGATIVE
SL AMB POCT BLOOD,UA: NEGATIVE
SL AMB POCT CLARITY,UA: CLEAR
SL AMB POCT COLOR,UA: ABNORMAL
SL AMB POCT KETONES,UA: NEGATIVE
SL AMB POCT NITRITE,UA: NEGATIVE
SL AMB POCT PH,UA: 9
SL AMB POCT SPECIFIC GRAVITY,UA: 1
SL AMB POCT URINE PROTEIN: ABNORMAL
SL AMB POCT UROBILINOGEN: 1

## 2022-12-13 NOTE — PROGRESS NOTES
Name: Kari Dias      : 1985      MRN: [id number]  Encounter Provider: Bob Jones DO  Encounter Date: 2022   Encounter department: [address]    Assessment & Plan   UA dip reveals trace leukocytes  Urine sent for culture  We will heed results  She will be scheduled for abdominal ultrasound  Recommend patient try Motrin or Aleve twice daily with meals for 1 week and may apply ice alternating with heat as needed  During the office as needed  1  Left upper quadrant abdominal pain  -     US abdomen complete; Future; Expected date: 2022    2  Abnormal urine odor  -     POCT urine dip  -     Urine culture           Subjective      Patient complains of left upper abdominal and left lower chest wall pain for the past 5 months  Describes this as a dull ache with occasional sharp pains  No specific injury or fall  Not associated with meals or activity  Diet remains good and patient denies heartburn or indigestion  Denies nausea, vomiting, constipation or diarrhea  She has treated this with Motrin on 1 occasion without significant relief  She also complains of urine having a strange odor past few months  Denies any typical UTI symptoms  Abdominal Pain  This is a new problem  The current episode started more than 1 month ago  The problem occurs intermittently  The problem has been gradually worsening  The pain is located in the LUQ  The quality of the pain is aching and sharp  The abdominal pain does not radiate  Pertinent negatives include no anorexia, belching, constipation, diarrhea, dysuria, fever, frequency, hematochezia, hematuria, melena, nausea, vomiting or weight loss  Nothing aggravates the pain  The pain is relieved by nothing  Treatments tried: motrin  The treatment provided mild relief  There is no history of abdominal surgery, Crohn's disease, gallstones, GERD, irritable bowel syndrome, pancreatitis, PUD or ulcerative colitis       Review of Systems   Constitutional: Negative for fever and weight loss  Gastrointestinal: Positive for abdominal pain  Negative for anorexia, constipation, diarrhea, hematochezia, melena, nausea and vomiting  Genitourinary: Negative for dysuria, frequency and hematuria  Current Outpatient Medications on File Prior to Visit   Medication Sig   • levonorgestrel (MIRENA) 20 MCG/24HR IUD 1 each by Intrauterine route once        Objective     /62 (BP Location: Left arm, Patient Position: Sitting, Cuff Size: Standard)   Pulse 84   Temp (!) 97 °F (36 1 °C) (Tympanic)   Resp 16   Ht 5' 2" (1 575 m)   Wt 58 4 kg (128 lb 12 8 oz)   LMP 11/21/2022   SpO2 99%   BMI 23 56 kg/m²     Physical Exam  Constitutional:       General: She is not in acute distress  Appearance: She is well-developed  She is not ill-appearing, toxic-appearing or diaphoretic  HENT:      Head: Normocephalic  Mouth/Throat:      Mouth: Mucous membranes are moist    Eyes:      General: No scleral icterus  Cardiovascular:      Rate and Rhythm: Normal rate and regular rhythm  Pulmonary:      Effort: Pulmonary effort is normal       Breath sounds: Normal breath sounds  Abdominal:      General: Abdomen is flat  Bowel sounds are normal       Palpations: Abdomen is soft  Tenderness: There is abdominal tenderness in the left upper quadrant  There is no right CVA tenderness, left CVA tenderness, guarding or rebound  Comments: Mild left upper quadrant tenderness and left lower ribs tenderness  Skin:     General: Skin is warm and dry  Neurological:      General: No focal deficit present  Mental Status: She is alert and oriented to person, place, and time     Psychiatric:         Mood and Affect: Mood normal          Behavior: Behavior normal        Tessa Ball DO

## 2022-12-16 ENCOUNTER — HOSPITAL ENCOUNTER (OUTPATIENT)
Dept: ULTRASOUND IMAGING | Facility: HOSPITAL | Age: 37
Discharge: HOME | End: 2022-12-16

## 2022-12-16 DIAGNOSIS — R10.12 LEFT UPPER QUADRANT ABDOMINAL PAIN: ICD-10-CM

## 2023-03-27 ENCOUNTER — OFFICE VISIT (OUTPATIENT)
Dept: FAMILY MEDICINE CLINIC | Facility: CLINIC | Age: 38
End: 2023-03-27

## 2023-03-27 VITALS
SYSTOLIC BLOOD PRESSURE: 120 MMHG | TEMPERATURE: 97.2 F | WEIGHT: 120.6 LBS | BODY MASS INDEX: 22.19 KG/M2 | OXYGEN SATURATION: 99 % | HEART RATE: 87 BPM | DIASTOLIC BLOOD PRESSURE: 64 MMHG | HEIGHT: 62 IN

## 2023-03-27 DIAGNOSIS — J02.8 ACUTE PHARYNGITIS DUE TO OTHER SPECIFIED ORGANISMS: Primary | ICD-10-CM

## 2023-03-27 RX ORDER — AMOXICILLIN 500 MG/1
500 CAPSULE ORAL EVERY 12 HOURS SCHEDULED
Qty: 20 CAPSULE | Refills: 0 | Status: SHIPPED | OUTPATIENT
Start: 2023-03-27 | End: 2023-04-06

## 2023-03-27 NOTE — PROGRESS NOTES
Name: Carmen Diamond      : 1985      MRN: [id number]  Encounter Provider: CHIKIS Mann  Encounter Date: 3/27/2023   Encounter department: [address] [place] [address]  Acute pharyngitis due to other specified organisms  Assessment & Plan:  Acute symptomatic was in urgent care with rapid strep negative  Declined throat culture  Noted redness, swelling, and exudate on exam  Will recommend start amox 500mg BID x 10 days  Educated on s/e and proper use and call with worsening of symptoms or no improvement  Orders:  -     amoxicillin (AMOXIL) 500 mg capsule; Take 1 capsule (500 mg total) by mouth every 12 (twelve) hours for 10 days           Subjective      Patient arrives with c/o sore throat left ear pain, headache, loss of voice  Patient reports was seen in urgent care and tested neg for rapid strep  The did suggest sending culture but she declined at the time  Continues with symptoms and worsening  Review of Systems   Constitutional: Negative for activity change, appetite change, chills, fatigue and fever  HENT: Positive for sore throat, trouble swallowing and voice change  Negative for congestion, postnasal drip, rhinorrhea and sneezing  Respiratory: Positive for cough (dry cough)  Negative for chest tightness, shortness of breath and wheezing  Cardiovascular: Negative for chest pain and palpitations  Gastrointestinal: Negative for abdominal pain, constipation, diarrhea, nausea and vomiting  Musculoskeletal: Negative for arthralgias and myalgias  Skin: Negative for color change, pallor and rash  Neurological: Positive for headaches  Negative for dizziness, weakness and light-headedness  Hematological: Negative for adenopathy  Psychiatric/Behavioral: Negative for agitation and confusion         Current Outpatient Medications on File Prior to Visit   Medication Sig   • levonorgestrel (MIRENA) 20 MCG/24HR IUD 1 each by Intrauterine "route once        Objective     /64 (BP Location: Left arm, Patient Position: Sitting, Cuff Size: Standard)   Pulse 87   Temp (!) 97 2 °F (36 2 °C) (Tympanic)   Ht 5' 2\" (1 575 m)   Wt 54 7 kg (120 lb 9 6 oz)   SpO2 99%   BMI 22 06 kg/m²     Physical Exam  Vitals and nursing note reviewed  Constitutional:       General: She is not in acute distress  Appearance: Normal appearance  She is not ill-appearing or diaphoretic  HENT:      Head: Normocephalic and atraumatic  Right Ear: Hearing and external ear normal       Left Ear: External ear normal  Decreased hearing noted  Nose: No congestion or rhinorrhea  Mouth/Throat:      Pharynx: Uvula midline  Pharyngeal swelling, oropharyngeal exudate and posterior oropharyngeal erythema present  Eyes:      General: No scleral icterus  Right eye: No discharge  Left eye: No discharge  Cardiovascular:      Rate and Rhythm: Normal rate and regular rhythm  Pulmonary:      Effort: Pulmonary effort is normal  No respiratory distress  Musculoskeletal:         General: Normal range of motion  Cervical back: Normal range of motion  Lymphadenopathy:      Cervical: Cervical adenopathy present  Skin:     Coloration: Skin is not jaundiced or pale  Findings: No bruising, erythema or rash  Neurological:      General: No focal deficit present  Mental Status: She is alert and oriented to person, place, and time  Psychiatric:         Mood and Affect: Mood normal          Behavior: Behavior normal          Thought Content:  Thought content normal          Judgment: Judgment normal        CHIKIS Carias  "

## 2023-03-27 NOTE — ASSESSMENT & PLAN NOTE
Acute symptomatic was in urgent care with rapid strep negative  Declined throat culture  Noted redness, swelling, and exudate on exam  Will recommend start amox 500mg BID x 10 days  Educated on s/e and proper use and call with worsening of symptoms or no improvement

## 2023-03-30 ENCOUNTER — ANNUAL EXAM (OUTPATIENT)
Dept: GYNECOLOGY | Facility: CLINIC | Age: 38
End: 2023-03-30

## 2023-03-30 VITALS
DIASTOLIC BLOOD PRESSURE: 70 MMHG | HEIGHT: 61 IN | WEIGHT: 119.4 LBS | SYSTOLIC BLOOD PRESSURE: 118 MMHG | BODY MASS INDEX: 22.54 KG/M2

## 2023-03-30 DIAGNOSIS — Z98.890 H/O LEEP: ICD-10-CM

## 2023-03-30 DIAGNOSIS — Z01.419 WOMEN'S ANNUAL ROUTINE GYNECOLOGICAL EXAMINATION: Primary | ICD-10-CM

## 2023-03-30 DIAGNOSIS — Z97.5 IUD CONTRACEPTION: ICD-10-CM

## 2023-03-30 DIAGNOSIS — N91.4 SECONDARY OLIGOMENORRHEA: ICD-10-CM

## 2023-03-30 DIAGNOSIS — Z11.51 SCREENING FOR HUMAN PAPILLOMAVIRUS (HPV): ICD-10-CM

## 2023-03-30 DIAGNOSIS — N80.9 ENDOMETRIOSIS: ICD-10-CM

## 2023-03-30 PROBLEM — N83.209 CYST OF OVARY: Status: RESOLVED | Noted: 2019-02-11 | Resolved: 2023-03-30

## 2023-03-30 PROBLEM — R87.619 ATYPICAL GLANDULAR CELLS OF UNDETERMINED SIGNIFICANCE (AGUS) ON CERVICAL PAP SMEAR: Status: RESOLVED | Noted: 2017-02-13 | Resolved: 2023-03-30

## 2023-03-30 PROBLEM — A63.0 CONDYLOMA ACUMINATA: Status: RESOLVED | Noted: 2019-12-02 | Resolved: 2023-03-30

## 2023-03-30 PROBLEM — R87.611 PAP SMEAR OF CERVIX WITH ASCUS, CANNOT EXCLUDE HGSIL: Status: RESOLVED | Noted: 2017-02-13 | Resolved: 2023-03-30

## 2023-03-30 NOTE — PROGRESS NOTES
Assessment/Plan   Diagnoses and all orders for this visit:    Women's annual routine gynecological examination    IUD contraception    H/O LEEP    Endometriosis    Secondary oligomenorrhea    1  yearly exam-Pap smear done with HPV testing, self breast awareness reviewed  2  previous episode of pelvic pain- noted at most recent visit 9/22/2021  She denies any recurrence of this  Was seen in the emergency room, had 18 mm right ovarian finding consistent with resolving cyst on ultrasound with IUD in good position  To call return with any recurrence of symptoms  3  Mirena IUD-placed 12/26/2017  Counseled about FDA approval up to 8 years duration  She will continue with this  Consider removal with reinsertion December 2025  4  irregular bleeding-did go on a more strenuous exercise program   Had light spotting/bleeding 15 out of 16 days, ending 3/3/2023  She did lose about 10 pounds as well  She denies any complaints now  Would recommend no intervention at this time  If persists or recurs, would suggest she call back  We could consider laboratory testing and ultrasound as needed  Additionally, if she has more persistent irregular bleeding, could proceed with Mirena IUD removal with reinsertion earlier than the 8 year petrona  5  status post laparoscopic bilateral salpingectomy  6  history of endometriosis- noted at laparoscopy with bilateral salpingectomy April 2019 ,  At that time, the ovarian cyst was removed consistent with endometriosis  7  history of severe dysplasia-status post LEEP cone biopsy 10/24/2017  Has had negative testing since then  Pap with HPV done today, disposition as per findings  8   History of condyloma- treated 12/2/2019 with prompt resolution  9  other- previous use of NuvaRing and OCP in the past   This did not was well for cycle control as the Mirena does  10  other-children are ages 6 and 11  Follow-up 1 year for yearly exam or as needed      Subjective   Patient ID: Constantino Michelle Kyle Aburto is a 30 y o  female      Vitals:    23 1612   BP: 118/70     HPI    The following portions of the patient's history were reviewed and updated as appropriate: allergies, current medications, past family history, past medical history, past social history, past surgical history and problem list   Past Medical History:   Diagnosis Date   • Abnormal Pap smear of cervix    • Adnexal fullness    • Amenorrhea    • Anxiety    • Arthralgia of toe    • Endometriosis 2020   • Exposure to parvovirus    • GERD (gastroesophageal reflux disease)     during pregnancy   • HPV (human papilloma virus) infection    • Nephrolithiasis    • Umbilical hernia    • Urinary tract infection      Past Surgical History:   Procedure Laterality Date   • CERVICAL BIOPSY  W/ LOOP ELECTRODE EXCISION     • COLPOSCOPY W/ BIOPSY / CURETTAGE     • OSTEOTOMY AND ULNAR SHORTENING Right    • AR LAPS ABD PRTM&OMENTUM DX W/WO SPEC BR/WA SPX Bilateral 2019    Procedure: DIAGNOSTIC LAP; BILATERAL SALPINGECTOMY; OVARIAN CYSTECTOMY, ECSISION PELVIC CYST;  Surgeon: Edel Lenz MD;  Location: [facility];  Service: Gynecology   • TOOTH EXTRACTION      [place] teeth   • WRIST SURGERY       OB History    Para Term  AB Living   2 2 2 0 0 2   SAB IAB Ectopic Multiple Live Births   0 0 0 0 2      # Outcome Date GA Lbr Kennedy/2nd Weight Sex Delivery Anes PTL Lv   2 Term 17 38w2d / 00:06 3515 g (7 lb 12 oz) M Vag-Spont None N YUSUF   1 Term                Current Outpatient Medications:   •  amoxicillin (AMOXIL) 500 mg capsule, Take 1 capsule (500 mg total) by mouth every 12 (twelve) hours for 10 days, Disp: 20 capsule, Rfl: 0  •  levonorgestrel (MIRENA) 20 MCG/24HR IUD, 1 each by Intrauterine route once , Disp: , Rfl:   Allergies   Allergen Reactions   • Pollen Extract    • Nickel Rash     Some earrings will get rash and very sore     Social History     Socioeconomic History   • Marital status: /Civil Union     Spouse name: "None   • Number of children: None   • Years of education: None   • Highest education level: None   Occupational History   • None   Tobacco Use   • Smoking status: Never   • Smokeless tobacco: Never   Vaping Use   • Vaping Use: Never used   Substance and Sexual Activity   • Alcohol use: Not Currently     Comment: occasional   • Drug use: No   • Sexual activity: Yes     Partners: Male   Other Topics Concern   • None   Social History Narrative    Caffeine use    Contraceptive vaginal ring    One child    Taoist affiliation- Lutheran     uses safety equipment- seatbelt     Social Determinants of Health     Financial Resource Strain: Not on file   Food Insecurity: Not on file   Transportation Needs: Not on file   Physical Activity: Not on file   Stress: Not on file   Social Connections: Not on file   Intimate Partner Violence: Not on file   Housing Stability: Not on file     Family History   Problem Relation Age of Onset   • Diabetes Maternal Grandmother    • Heart disease Paternal Grandfather    • Hypertension Paternal Grandfather    • Hyperlipidemia Paternal Grandfather    • Hypertension Father    • Hyperlipidemia Father    • Heart disease Maternal Grandfather    • Diabetes Paternal Grandmother    • Diabetes Family        Review of Systems    Objective   Physical Exam  OBGyn Exam     Objective      /70 (BP Location: Right arm, Patient Position: Sitting)   Ht 5' 1\" (1 549 m)   Wt 54 2 kg (119 lb 6 4 oz)   LMP 02/22/2023   BMI 22 56 kg/m²     General:   alert and oriented, in no acute distress   Neck: normal to inspection and palpation   Breast: normal appearance, no masses or tenderness   Heart:    Lungs:    Abdomen: soft, non-tender, without masses or organomegaly   Vulva: normal   Vagina: Without erythema or lesions or discharge  Normal   Cervix: Without lesions or discharge or cervicitis  No Cervical motion tenderness    IUD string seen at a length of 1 5 cm   Uterus: top normal size, anteverted, " non-tender   Adnexa: no mass, fullness, tenderness   Rectum: negative    Psych:  Normal mood and affect   Skin:  Without obvious lesions   Eyes: symmetric, with normal movements and reactivity   Musculoskeletal:  Normal muscle tone and movements appreciated

## 2023-04-01 ENCOUNTER — OFFICE VISIT (OUTPATIENT)
Dept: URGENT CARE | Age: 38
End: 2023-04-01

## 2023-04-01 VITALS
HEIGHT: 61 IN | HEART RATE: 80 BPM | WEIGHT: 119 LBS | BODY MASS INDEX: 22.47 KG/M2 | RESPIRATION RATE: 18 BRPM | TEMPERATURE: 98.6 F | OXYGEN SATURATION: 99 %

## 2023-04-01 DIAGNOSIS — H65.02 NON-RECURRENT ACUTE SEROUS OTITIS MEDIA OF LEFT EAR: Primary | ICD-10-CM

## 2023-04-01 RX ORDER — METHYLPREDNISOLONE 4 MG/1
TABLET ORAL
Qty: 21 EACH | Refills: 0 | Status: SHIPPED | OUTPATIENT
Start: 2023-04-01

## 2023-04-01 RX ORDER — BENZONATATE 200 MG/1
200 CAPSULE ORAL 3 TIMES DAILY PRN
Qty: 20 CAPSULE | Refills: 0 | Status: SHIPPED | OUTPATIENT
Start: 2023-04-01

## 2023-04-01 RX ORDER — ALBUTEROL SULFATE 90 UG/1
2 AEROSOL, METERED RESPIRATORY (INHALATION) EVERY 6 HOURS PRN
Qty: 8 G | Refills: 0 | Status: SHIPPED | OUTPATIENT
Start: 2023-04-01

## 2023-04-01 NOTE — PATIENT INSTRUCTIONS
Take Medrol as directed until completed  Use additional medications as directed for symptomatic relief as needed  Motrin and or Tylenol as needed for any fevers or pain  Continue antibiotic and finish off as prescribed  Follow up with PCP in 3-5 days  Proceed to  ER if symptoms worsen    Fluid In The Ear (Serous Otitis Media)   AMBULATORY CARE:   Serous otitis media [facility])  is fluid trapped in the middle of your ear behind your eardrum  This condition usually develops without signs or symptoms of an ear infection  Serous otitis media may be caused by an upper respiratory infection or allergies  It is most common in the fall and early spring  Signs and symptoms:   Trouble hearing    Sounds are muffled    Plugged ear or an ear that feels full    Ear discomfort or popping    Ringing or buzzing in your ear    Call your doctor if:   You develop severe ear pain  The outside of your ear is red or swollen  You have fluid coming from your ear  You have questions or concerns about your condition or care  Medicines: You may need any of the following:  Acetaminophen  decreases pain and fever  It is available without a doctor's order  Ask how much to take and how often to take it  Follow directions  Read the labels of all other medicines you are using to see if they also contain acetaminophen, or ask your doctor or pharmacist  Acetaminophen can cause liver damage if not taken correctly  NSAIDs , such as ibuprofen, help decrease swelling, pain, and fever  This medicine is available with or without a doctor's order  NSAIDs can cause stomach bleeding or kidney problems in certain people  If you take blood thinner medicine, always ask your healthcare provider if NSAIDs are safe for you  Always read the medicine label and follow directions  Steroids  help decrease inflammation so the fluid can drain from your ear  Antibiotics  may be needed if a bacterial infection caused your SARAI      How to stay healthy: Wash your hands often throughout the day  Use soap and water  Rub your soapy hands together, lacing your fingers, for at least 20 seconds  Rinse with warm, running water  Dry your hands with a clean towel or paper towel  Use hand  that contains alcohol if soap and water are not available  Teach children how to wash their hands and use hand   Avoid people who are sick  Some germs are easily and quickly spread through contact  Follow up with your doctor as directed:  Write down your questions so you remember to ask them during your visits  © Copyright [employer] 2022 Information is for End User's use only and may not be sold, redistributed or otherwise used for commercial purposes  The above information is an  only  It is not intended as medical advice for individual conditions or treatments  Talk to your doctor, nurse or pharmacist before following any medical regimen to see if it is safe and effective for you

## 2023-04-01 NOTE — PROGRESS NOTES
[facility]        NAME: Gonzalo Rayo is a 30 y o  female  : 1985    MRN: [id number]  DATE: 2023  TIME: 12:40 PM    Assessment and Plan   Non-recurrent acute serous otitis media of left ear [H65 02]  1  Non-recurrent acute serous otitis media of left ear  albuterol (PROVENTIL HFA,VENTOLIN HFA) 90 mcg/act inhaler    benzonatate (TESSALON) 200 MG capsule    methylPREDNISolone 4 MG tablet therapy pack            Patient Instructions     Take Medrol as directed until completed  Use additional medications as directed for symptomatic relief as needed  Motrin and or Tylenol as needed for any fevers or pain  Continue antibiotic and finish off as prescribed  Follow up with PCP in 3-5 days  Proceed to  ER if symptoms worsen      Chief Complaint     Chief Complaint   Patient presents with   • Earache     Left ear pain x 5 days, cough, chest congestion x 8 days         History of Present Illness       HPI    Review of Systems   Review of Systems      Current Medications       Current Outpatient Medications:   •  albuterol (PROVENTIL HFA,VENTOLIN HFA) 90 mcg/act inhaler, Inhale 2 puffs every 6 (six) hours as needed for wheezing or shortness of breath, Disp: 8 g, Rfl: 0  •  amoxicillin (AMOXIL) 500 mg capsule, Take 1 capsule (500 mg total) by mouth every 12 (twelve) hours for 10 days, Disp: 20 capsule, Rfl: 0  •  benzonatate (TESSALON) 200 MG capsule, Take 1 capsule (200 mg total) by mouth 3 (three) times a day as needed for cough, Disp: 20 capsule, Rfl: 0  •  levonorgestrel (MIRENA) 20 MCG/24HR IUD, 1 each by Intrauterine route once , Disp: , Rfl:   •  methylPREDNISolone 4 MG tablet therapy pack, Use as directed on package, Disp: 21 each, Rfl: 0    Current Allergies     Allergies as of 2023 - Reviewed 2023   Allergen Reaction Noted   • Pollen extract  2013   • Nickel Rash 2019            The following portions of the patient's history were reviewed and updated as "appropriate: allergies, current medications, past family history, past medical history, past social history, past surgical history and problem list      Past Medical History:   Diagnosis Date   • Abnormal Pap smear of cervix    • Adnexal fullness    • Amenorrhea    • Anxiety    • Arthralgia of toe    • Endometriosis 11/17/2020   • Exposure to parvovirus    • GERD (gastroesophageal reflux disease)     during pregnancy   • HPV (human papilloma virus) infection    • Nephrolithiasis    • Umbilical hernia    • Urinary tract infection        Past Surgical History:   Procedure Laterality Date   • CERVICAL BIOPSY  W/ LOOP ELECTRODE EXCISION     • COLPOSCOPY W/ BIOPSY / CURETTAGE     • OSTEOTOMY AND ULNAR SHORTENING Right    • RI LAPS ABD PRTM&OMENTUM DX W/WO SPEC BR/WA SPX Bilateral 4/16/2019    Procedure: DIAGNOSTIC LAP; BILATERAL SALPINGECTOMY; OVARIAN CYSTECTOMY, ECSISION PELVIC CYST;  Surgeon: Desire Bunch MD;  Location: [facility];  Service: Gynecology   • TOOTH EXTRACTION      [place] teeth   • WRIST SURGERY         Family History   Problem Relation Age of Onset   • Diabetes Maternal Grandmother    • Heart disease Paternal Grandfather    • Hypertension Paternal Grandfather    • Hyperlipidemia Paternal Grandfather    • Hypertension Father    • Hyperlipidemia Father    • Heart disease Maternal Grandfather    • Diabetes Paternal Grandmother    • Diabetes Family          Medications have been verified  Objective   Pulse 80   Temp 98 6 °F (37 °C)   Resp 18   Ht 5' 1\" (1 549 m)   Wt 54 kg (119 lb)   SpO2 99%   BMI 22 48 kg/m²   No LMP recorded  Physical Exam     Physical Exam  Vitals and nursing note reviewed  Constitutional:       General: She is not in acute distress  Appearance: Normal appearance  She is well-developed  HENT:      Head: Normocephalic and atraumatic  Right Ear: Hearing, tympanic membrane, ear canal and external ear normal  There is no impacted cerumen        Left Ear: Ear " canal and external ear normal  Decreased hearing noted  A middle ear effusion is present  There is no impacted cerumen  Tympanic membrane is bulging  Tympanic membrane is not injected, erythematous or retracted  Nose: Congestion and rhinorrhea present  Mouth/Throat:      Lips: Pink  Mouth: Mucous membranes are moist       Pharynx: Uvula midline  Posterior oropharyngeal erythema (Moderate with cobblestoning) present  No oropharyngeal exudate  Eyes:      General:         Right eye: No discharge  Left eye: No discharge  Conjunctiva/sclera: Conjunctivae normal    Cardiovascular:      Rate and Rhythm: Normal rate and regular rhythm  Heart sounds: Normal heart sounds  No murmur heard  Pulmonary:      Effort: Pulmonary effort is normal  No respiratory distress  Breath sounds: Normal breath sounds  No wheezing or rales  Abdominal:      General: Bowel sounds are normal       Palpations: Abdomen is soft  Tenderness: There is no abdominal tenderness  Musculoskeletal:         General: Normal range of motion  Cervical back: Normal range of motion and neck supple  Lymphadenopathy:      Cervical: No cervical adenopathy  Skin:     General: Skin is warm and dry  Neurological:      Mental Status: She is alert and oriented to person, place, and time     Psychiatric:         Mood and Affect: Mood normal  sounds are normal       Palpations: Abdomen is soft  Tenderness: There is no abdominal tenderness  Musculoskeletal:         General: Normal range of motion  Cervical back: Normal range of motion and neck supple  Lymphadenopathy:      Cervical: No cervical adenopathy  Skin:     General: Skin is warm and dry  Neurological:      Mental Status: She is alert and oriented to person, place, and time     Psychiatric:         Mood and Affect: Mood normal

## 2023-04-04 LAB
HPV HR 12 DNA CVX QL NAA+PROBE: POSITIVE
HPV16 DNA CVX QL NAA+PROBE: NEGATIVE
HPV18 DNA CVX QL NAA+PROBE: NEGATIVE

## 2023-04-06 ENCOUNTER — PROCEDURE VISIT (OUTPATIENT)
Dept: GYNECOLOGY | Facility: CLINIC | Age: 38
End: 2023-04-06

## 2023-04-06 VITALS
DIASTOLIC BLOOD PRESSURE: 70 MMHG | SYSTOLIC BLOOD PRESSURE: 118 MMHG | BODY MASS INDEX: 21.9 KG/M2 | WEIGHT: 119 LBS | HEIGHT: 62 IN | HEART RATE: 90 BPM

## 2023-04-06 DIAGNOSIS — Z98.890 H/O LEEP: ICD-10-CM

## 2023-04-06 DIAGNOSIS — D06.9 CIN III WITH SEVERE DYSPLASIA: ICD-10-CM

## 2023-04-06 DIAGNOSIS — B97.7 HPV (HUMAN PAPILLOMA VIRUS) INFECTION: Primary | ICD-10-CM

## 2023-04-06 NOTE — PROGRESS NOTES
Assessment/Plan   Diagnoses and all orders for this visit:    HPV (human papilloma virus) infection  -     Colposcopy    H/O LEEP  -     Colposcopy    JUDE III with severe dysplasia  -     Colposcopy    1  positive HPV-patient status post LEEP cone biopsy 10/24/2017  Has had negative testing until this most recent test   Colposcopy done with ECC and biopsies at 12, 6, and 9:00  Patient tolerated procedure well  Pelvic rest recommended for the next 5 to 7 days  Follow-up 2 to 3 weeks time to discuss results and plan treatment accordingly  2  previous episode of pelvic pain breast tenderness-resolved  Did have light menses shortly after breast tenderness, likely related to hormonal change  3  Mirena IUD-placed 6/26/2017, aware of approval FDA update years duration  4  history of irregular menses-denies complaints  5   Status post laparoscopic bilateral salpingectomy  6  history endometriosis-noted at laparoscopy with bilateral salpingectomy April 2019  Ovarian cyst was removed at that time consistent with endometriosis  7  condyloma- treated 12/2/2019 with prompt resolution  8  left labia majora cyst notes to be consistent with sebaceous or inclusion cyst   Patient to follow, call or return if any issues  9  other-had prior STD concerns, no current issues  Follow-up 2-3 weeks for office visit with me or as needed  Subjective   Patient ID: Maxi Sims is a 30 y o  female      Vitals:    04/06/23 0851   BP: 118/70   Pulse: 90     HPI    The following portions of the patient's history were reviewed and updated as appropriate: allergies, current medications, past family history, past medical history, past social history, past surgical history and problem list   Past Medical History:   Diagnosis Date   • Abnormal Pap smear of cervix    • Adnexal fullness    • Amenorrhea    • Anxiety    • Arthralgia of toe    • Endometriosis 11/17/2020   • Exposure to parvovirus    • GERD (gastroesophageal reflux disease)     during pregnancy   • HPV (human papilloma virus) infection    • Nephrolithiasis    • Umbilical hernia    • Urinary tract infection      Past Surgical History:   Procedure Laterality Date   • CERVICAL BIOPSY  W/ LOOP ELECTRODE EXCISION     • COLPOSCOPY W/ BIOPSY / CURETTAGE     • OSTEOTOMY AND ULNAR SHORTENING Right    • SC LAPS ABD PRTM&OMENTUM DX W/WO SPEC BR/WA SPX Bilateral 2019    Procedure: DIAGNOSTIC LAP; BILATERAL SALPINGECTOMY; OVARIAN CYSTECTOMY, ECSISION PELVIC CYST;  Surgeon: Eddie Adams MD;  Location: [facility];  Service: Gynecology   • TOOTH EXTRACTION      [place] teeth   • WRIST SURGERY       OB History    Para Term  AB Living   2 2 2 0 0 2   SAB IAB Ectopic Multiple Live Births   0 0 0 0 2      # Outcome Date GA Lbr Kennedy/2nd Weight Sex Delivery Anes PTL Lv   2 Term 17 38w2d / 00:06 3515 g (7 lb 12 oz) M Vag-Spont None N YUSUF   1 Term                Current Outpatient Medications:   •  albuterol (PROVENTIL HFA,VENTOLIN HFA) 90 mcg/act inhaler, Inhale 2 puffs every 6 (six) hours as needed for wheezing or shortness of breath, Disp: 8 g, Rfl: 0  •  amoxicillin (AMOXIL) 500 mg capsule, Take 1 capsule (500 mg total) by mouth every 12 (twelve) hours for 10 days, Disp: 20 capsule, Rfl: 0  •  benzonatate (TESSALON) 200 MG capsule, Take 1 capsule (200 mg total) by mouth 3 (three) times a day as needed for cough, Disp: 20 capsule, Rfl: 0  •  levonorgestrel (MIRENA) 20 MCG/24HR IUD, 1 each by Intrauterine route once , Disp: , Rfl:   •  methylPREDNISolone 4 MG tablet therapy pack, Use as directed on package, Disp: 21 each, Rfl: 0  Allergies   Allergen Reactions   • Pollen Extract    • Nickel Rash     Some earrings will get rash and very sore     Social History     Socioeconomic History   • Marital status: /Civil Union     Spouse name: None   • Number of children: None   • Years of education: None   • Highest education level: None   Occupational History   • None "  Tobacco Use   • Smoking status: Never   • Smokeless tobacco: Never   Vaping Use   • Vaping Use: Never used   Substance and Sexual Activity   • Alcohol use: Not Currently     Comment: occasional   • Drug use: No   • Sexual activity: Yes     Partners: Male   Other Topics Concern   • None   Social History Narrative    Caffeine use    Contraceptive vaginal ring    One child    Voodoo affiliation- Episcopalian     uses safety equipment- seatbelt     Social Determinants of Health     Financial Resource Strain: Not on file   Food Insecurity: Not on file   Transportation Needs: Not on file   Physical Activity: Not on file   Stress: Not on file   Social Connections: Not on file   Intimate Partner Violence: Not on file   Housing Stability: Not on file     Family History   Problem Relation Age of Onset   • Diabetes Maternal Grandmother    • Heart disease Paternal Grandfather    • Hypertension Paternal Grandfather    • Hyperlipidemia Paternal Grandfather    • Hypertension Father    • Hyperlipidemia Father    • Heart disease Maternal Grandfather    • Diabetes Paternal Grandmother    • Diabetes Family        Review of Systems    Objective   Physical Exam  OBGyn Exam     Objective      /70   Pulse 90   Ht 5' 2\" (1 575 m)   Wt 54 kg (119 lb)   BMI 21 77 kg/m²     General:   alert and oriented, in no acute distress   Neck:    Breast:    Heart:    Lungs:    Abdomen: soft, non-tender, without masses or organomegaly   Vulva: normal   Vagina: Without erythema or lesions or discharge  Normal   Cervix: Without lesions or discharge or cervicitis    No Cervical motion tenderness   Uterus:    Adnexa:    Rectum:     Psych:  Normal mood and affect   Skin:  Without obvious lesions   Eyes: symmetric, with normal movements and reactivity   Musculoskeletal:  Normal muscle tone and movements appreciated       "

## 2023-04-06 NOTE — PROGRESS NOTES
"   Colposcopy     Date/Time 4/6/2023 9:01 AM     Universal Protocol   Consent: Verbal consent obtained  Written consent obtained  Risks and benefits: risks, benefits and alternatives were discussed  Consent given by: patient  Time out: Immediately prior to procedure a \"time out\" was called to verify the correct patient, procedure, equipment, support staff and site/side marked as required  Timeout called at: 4/6/2023 9:01 AM   Patient understanding: patient states understanding of the procedure being performed  Patient consent: the patient's understanding of the procedure matches consent given  Procedure consent: procedure consent matches procedure scheduled  Relevant documents: relevant documents present and verified  Test results: test results available and properly labeled  Patient identity confirmed: verbally with patient       Performed by  Karime Sanchez MD     Authorized by Karime Sanchez MD        Pre-procedure details     Pre-procedure timeout performed: yes      Prepped with: acetic acid and Lugol       Procedure Details   Procedure: Colposcopy w/ cervical biopsy and ECC      Under satisfactory analgesia the patient was prepped and draped in the dorsal lithotomy position: yes      [place] speculum was placed in the vagina: yes      Under colposcopic examination the transition zone was seen in entirety: yes      Endocervix was curetted using a Kevorkian curette: yes      Cervical biopsy performed with a cervical biopsy punch: yes      Monsel's solution was applied: yes      Biopsy(s): yes      Location:  ECC, 12, 6, 9    Specimen to pathology: yes       Post-procedure      Findings: White epithelium      Impression: Low grade cervical dysplasia      Patient tolerance of procedure: Tolerated well, no immediate complications     Comments       Colposcopy done  The string was noted and moved out of the way during the biopsies  Patient tolerated procedure well    Monsel solution applied with excellent " hemostasis  Pelvic rest recommended for the next 5 to 7 days  Follow-up 2 to 3 weeks time for office visit with me to discuss results and plan treatment accordingly

## 2023-04-07 ENCOUNTER — TELEMEDICINE (OUTPATIENT)
Dept: FAMILY MEDICINE CLINIC | Facility: CLINIC | Age: 38
End: 2023-04-07

## 2023-04-07 DIAGNOSIS — H69.82 DYSFUNCTION OF LEFT EUSTACHIAN TUBE: Primary | ICD-10-CM

## 2023-04-07 PROBLEM — H69.92 DYSFUNCTION OF LEFT EUSTACHIAN TUBE: Status: ACTIVE | Noted: 2023-04-07

## 2023-04-07 LAB
LAB AP GYN PRIMARY INTERPRETATION: NORMAL
LAB AP LMP: NORMAL
Lab: NORMAL

## 2023-04-07 RX ORDER — FLUTICASONE PROPIONATE 50 MCG
2 SPRAY, SUSPENSION (ML) NASAL DAILY
Qty: 9.9 ML | Refills: 1 | Status: SHIPPED | OUTPATIENT
Start: 2023-04-07

## 2023-04-07 NOTE — PROGRESS NOTES
Name: Maxi Sims      : 1985      MRN: [id number]  Encounter Provider: CHIKIS Murdock  Encounter Date: 2023   Encounter department: [address] [place] [address]  Dysfunction of left eustachian tube  Assessment & Plan:  Acute symptomatic recently treated for acute pharyngitis with amoxicillin also treated for fluid behind the ear with a prednisone taper, continues with no improvement  Denies all other symptoms  Will recommend Flonase 2 sprays each nostril once daily and will provide referral for ENT  Educated on side effects proper use call with worsening of symptoms no improvement or schedule with ENT    Orders:  -     fluticasone (FLONASE) 50 mcg/act nasal spray; 2 sprays into each nostril daily  -     Ambulatory Referral to Otolaryngology; Future           Subjective      Patient reports with complaints of left ear pain  Patient was seen originally by urgent care had a rapid strep negative patient then seen in PCPs office diagnosed with acute pharyngitis provided amoxicillin x7 days patient finished continues with left-sided ear pain went to urgent care 2023 provided prednisone taper patient reports no improvement  Patient has not tried Flonase  Patient unable to come in office    Review of Systems   Constitutional: Negative for activity change, appetite change, chills, fatigue and fever  HENT: Positive for ear pain (left)  Negative for congestion, postnasal drip, rhinorrhea, sneezing and sore throat  Respiratory: Negative for cough, chest tightness, shortness of breath and wheezing  Cardiovascular: Negative for chest pain and palpitations  Gastrointestinal: Negative for abdominal pain, constipation, diarrhea, nausea and vomiting  Musculoskeletal: Negative for arthralgias and myalgias  Skin: Negative for color change, pallor and rash  Neurological: Negative for dizziness, weakness, light-headedness and headaches  Hematological: Negative for adenopathy  Psychiatric/Behavioral: Negative for agitation and confusion  Current Outpatient Medications on File Prior to Visit   Medication Sig   • albuterol (PROVENTIL HFA,VENTOLIN HFA) 90 mcg/act inhaler Inhale 2 puffs every 6 (six) hours as needed for wheezing or shortness of breath   • [] amoxicillin (AMOXIL) 500 mg capsule Take 1 capsule (500 mg total) by mouth every 12 (twelve) hours for 10 days   • benzonatate (TESSALON) 200 MG capsule Take 1 capsule (200 mg total) by mouth 3 (three) times a day as needed for cough   • levonorgestrel (MIRENA) 20 MCG/24HR IUD 1 each by Intrauterine route once    • methylPREDNISolone 4 MG tablet therapy pack Use as directed on package       Objective     There were no vitals taken for this visit  Physical Exam  Vitals and nursing note reviewed  Constitutional:       General: She is not in acute distress  Appearance: Normal appearance  She is not ill-appearing or diaphoretic  HENT:      Head: Normocephalic and atraumatic  Nose: No congestion or rhinorrhea  Eyes:      General: No scleral icterus  Right eye: No discharge  Left eye: No discharge  Pulmonary:      Effort: Pulmonary effort is normal  No respiratory distress  Musculoskeletal:         General: Normal range of motion  Cervical back: Normal range of motion  Skin:     Coloration: Skin is not jaundiced or pale  Findings: No bruising, erythema or rash  Neurological:      General: No focal deficit present  Mental Status: She is alert and oriented to person, place, and time  Psychiatric:         Mood and Affect: Mood normal          Behavior: Behavior normal          Thought Content:  Thought content normal          Judgment: Judgment normal        Rosalie Karol Juan

## 2023-04-07 NOTE — ASSESSMENT & PLAN NOTE
Acute symptomatic recently treated for acute pharyngitis with amoxicillin also treated for fluid behind the ear with a prednisone taper, continues with no improvement  Denies all other symptoms  Will recommend Flonase 2 sprays each nostril once daily and will provide referral for ENT    Educated on side effects proper use call with worsening of symptoms no improvement or schedule with ENT

## 2023-04-27 ENCOUNTER — OFFICE VISIT (OUTPATIENT)
Dept: GYNECOLOGY | Facility: CLINIC | Age: 38
End: 2023-04-27

## 2023-04-27 VITALS
BODY MASS INDEX: 22.01 KG/M2 | DIASTOLIC BLOOD PRESSURE: 72 MMHG | HEIGHT: 62 IN | WEIGHT: 119.6 LBS | SYSTOLIC BLOOD PRESSURE: 118 MMHG

## 2023-04-27 DIAGNOSIS — B97.7 HPV (HUMAN PAPILLOMA VIRUS) INFECTION: ICD-10-CM

## 2023-04-27 DIAGNOSIS — N91.4 SECONDARY OLIGOMENORRHEA: ICD-10-CM

## 2023-04-27 DIAGNOSIS — Z97.5 IUD CONTRACEPTION: ICD-10-CM

## 2023-04-27 DIAGNOSIS — N87.0 MILD CERVICAL DYSPLASIA: Primary | ICD-10-CM

## 2023-04-27 NOTE — PROGRESS NOTES
Assessment/Plan   Diagnoses and all orders for this visit:    Mild cervical dysplasia    HPV (human papilloma virus) infection    Secondary oligomenorrhea    IUD contraception    1  positive HPV/low-grade dysplasia- had negative Pap but positive HPV from yearly exam 3/30/2023  Colposcopy done 4/6/23, with negative ECC, 9, and 12 biopsy  JUDE-1 was noted at 6:00 biopsy  Patient was counseled on the findings  Given the lack of high-grade changes, would recommend no intervention at this time  Patient is in agreement with this  To repeat Pap with HPV after 3/30/2023  2  history of high-grade dysplasia-status post LEEP cone biopsy 10/24/2017 with JUDE-2 to 3 noted at that time  She had negative testing until the most recent Pap test   Follow-up as noted above  3  Mirena IUD-placed 6/26/2017, in good position on last exam   Patient aware of approval from the FDA up to 8 years duration  4  previous history of pelvic pain/breast tenderness/irregular menses/condyloma/labial cyst-no current concerns  5  status post laparoscopic bilateral salpingectomy  6  history of endometriosis-noted on laparoscopy with bilateral salpingectomy from surgery April 2019  Ovarian cyst was removed at that time consistent with endometriosis  Patient denies any complaints  7   Other-no STD concerns  Follow-up after 3/30/2024 for yearly exam or as needed  Subjective   Patient ID: Gonzalo Rayo is a 30 y o  female  Vitals:    04/27/23 1601   BP: 118/72     Patient was seen today for follow-up visit  Please see assessment plan for details        The following portions of the patient's history were reviewed and updated as appropriate: allergies, current medications, past family history, past medical history, past social history, past surgical history and problem list   Past Medical History:   Diagnosis Date   • Abnormal Pap smear of cervix    • Adnexal fullness    • Amenorrhea    • Anxiety    • Arthralgia of toe    • Endometriosis 2020   • Exposure to parvovirus    • GERD (gastroesophageal reflux disease)     during pregnancy   • HPV (human papilloma virus) infection    • Nephrolithiasis    • Umbilical hernia    • Urinary tract infection      Past Surgical History:   Procedure Laterality Date   • CERVICAL BIOPSY  W/ LOOP ELECTRODE EXCISION     • COLPOSCOPY W/ BIOPSY / CURETTAGE     • OSTEOTOMY AND ULNAR SHORTENING Right    • SD LAPS ABD PRTM&OMENTUM DX W/WO SPEC BR/WA SPX Bilateral 2019    Procedure: DIAGNOSTIC LAP; BILATERAL SALPINGECTOMY; OVARIAN CYSTECTOMY, ECSISION PELVIC CYST;  Surgeon: Hallie Pino MD;  Location: [facility];  Service: Gynecology   • TOOTH EXTRACTION      [place] teeth   • WRIST SURGERY       OB History    Para Term  AB Living   2 2 2 0 0 2   SAB IAB Ectopic Multiple Live Births   0 0 0 0 2      # Outcome Date GA Lbr Kennedy/2nd Weight Sex Delivery Anes PTL Lv   2 Term 17 38w2d / 00:06 3515 g (7 lb 12 oz) M Vag-Spont None N YUSUF   1 Term                Current Outpatient Medications:   •  albuterol (PROVENTIL HFA,VENTOLIN HFA) 90 mcg/act inhaler, Inhale 2 puffs every 6 (six) hours as needed for wheezing or shortness of breath, Disp: 8 g, Rfl: 0  •  azelastine (ASTELIN) 0 1 % nasal spray, 1 spray into each nostril 2 (two) times a day Use in each nostril as directed, Disp: 5 mL, Rfl: 3  •  benzonatate (TESSALON) 200 MG capsule, Take 1 capsule (200 mg total) by mouth 3 (three) times a day as needed for cough, Disp: 20 capsule, Rfl: 0  •  fluticasone (FLONASE) 50 mcg/act nasal spray, 2 sprays into each nostril daily, Disp: 9 9 mL, Rfl: 1  •  levonorgestrel (MIRENA) 20 MCG/24HR IUD, 1 each by Intrauterine route once , Disp: , Rfl:   •  methylPREDNISolone 4 MG tablet therapy pack, Use as directed on package, Disp: 21 each, Rfl: 0  •  pantoprazole (PROTONIX) 40 mg tablet, Take 1 tablet (40 mg total) by mouth daily, Disp: 30 tablet, Rfl: 0  Allergies   Allergen Reactions   • Pollen Extract • Nickel Rash     Some earrings will get rash and very sore     Social History     Socioeconomic History   • Marital status: /Civil Union     Spouse name: None   • Number of children: None   • Years of education: None   • Highest education level: None   Occupational History   • None   Tobacco Use   • Smoking status: Never   • Smokeless tobacco: Never   Vaping Use   • Vaping Use: Never used   Substance and Sexual Activity   • Alcohol use: Not Currently     Comment: occasional   • Drug use: No   • Sexual activity: Yes     Partners: Male   Other Topics Concern   • None   Social History Narrative    Caffeine use    Contraceptive vaginal ring    One child    Amish affiliation- Moravian     uses safety equipment- seatbelt     Social Determinants of Health     Financial Resource Strain: Not on file   Food Insecurity: Not on file   Transportation Needs: Not on file   Physical Activity: Not on file   Stress: Not on file   Social Connections: Not on file   Intimate Partner Violence: Not on file   Housing Stability: Not on file     Family History   Problem Relation Age of Onset   • Diabetes Maternal Grandmother    • Heart disease Paternal Grandfather    • Hypertension Paternal Grandfather    • Hyperlipidemia Paternal Grandfather    • Hypertension Father    • Hyperlipidemia Father    • Heart disease Maternal Grandfather    • Diabetes Paternal Grandmother    • Diabetes Family        Review of Systems   Constitutional: Negative for chills, diaphoresis, fatigue and fever  Respiratory: Negative for apnea, cough, chest tightness, shortness of breath and wheezing  Cardiovascular: Negative for chest pain, palpitations and leg swelling  Gastrointestinal: Negative for abdominal distention, abdominal pain, anal bleeding, constipation, diarrhea, nausea, rectal pain and vomiting     Genitourinary: Negative for difficulty urinating, dyspareunia, dysuria, frequency, hematuria, menstrual problem, pelvic pain, urgency, "vaginal bleeding, vaginal discharge and vaginal pain  Musculoskeletal: Negative for arthralgias, back pain and myalgias  Skin: Negative for color change and rash  Neurological: Negative for dizziness, syncope, light-headedness, numbness and headaches  Hematological: Negative for adenopathy  Does not bruise/bleed easily  Psychiatric/Behavioral: Negative for dysphoric mood and sleep disturbance  The patient is not nervous/anxious          Objective   Physical Exam  OBGyn Exam     Objective      /72 (BP Location: Right arm, Patient Position: Sitting)   Ht 5' 2\" (1 575 m)   Wt 54 3 kg (119 lb 9 6 oz)   LMP 04/16/2023   BMI 21 88 kg/m²     General:   alert and oriented, in no acute distress   Neck:    Breast:    Heart:    Lungs:    Abdomen:  Nontender   Vulva:    Vagina:    Cervix:    Uterus:    Adnexa:    Rectum:     Psych:  Normal mood and affect   Skin:  Without obvious lesions   Eyes: symmetric, with normal movements and reactivity   Musculoskeletal:  Normal muscle tone and movements appreciated       "

## 2023-05-26 PROBLEM — J02.8 ACUTE PHARYNGITIS DUE TO OTHER SPECIFIED ORGANISMS: Status: RESOLVED | Noted: 2023-03-27 | Resolved: 2023-05-26

## 2023-10-10 ENCOUNTER — TELEPHONE (OUTPATIENT)
Dept: GYNECOLOGY | Facility: CLINIC | Age: 38
End: 2023-10-10

## 2023-10-31 ENCOUNTER — PROCEDURE VISIT (OUTPATIENT)
Dept: GYNECOLOGY | Facility: CLINIC | Age: 38
End: 2023-10-31
Payer: COMMERCIAL

## 2023-10-31 VITALS — DIASTOLIC BLOOD PRESSURE: 64 MMHG | WEIGHT: 123.2 LBS | SYSTOLIC BLOOD PRESSURE: 118 MMHG | BODY MASS INDEX: 22.53 KG/M2

## 2023-10-31 DIAGNOSIS — Z30.433 ENCOUNTER FOR IUD REMOVAL AND REINSERTION: ICD-10-CM

## 2023-10-31 DIAGNOSIS — Z30.49 ENCOUNTER FOR SURVEILLANCE OF OTHER CONTRACEPTIVE: Primary | ICD-10-CM

## 2023-10-31 PROCEDURE — 58300 INSERT INTRAUTERINE DEVICE: CPT | Performed by: OBSTETRICS & GYNECOLOGY

## 2023-10-31 PROCEDURE — 58301 REMOVE INTRAUTERINE DEVICE: CPT | Performed by: OBSTETRICS & GYNECOLOGY

## 2023-10-31 NOTE — PROGRESS NOTES
Iud removal    Date/Time: 10/31/2023 4:09 PM    Performed by: Hai Pierson MD  Authorized by: Hai Pierson MD  Universal Protocol:  Consent: Verbal consent obtained. Written consent obtained. Risks and benefits: risks, benefits and alternatives were discussed  Consent given by: patient  Time out: Immediately prior to procedure a "time out" was called to verify the correct patient, procedure, equipment, support staff and site/side marked as required. Timeout called at: 10/31/2023 4:09 PM.  Patient understanding: patient states understanding of the procedure being performed  Patient consent: the patient's understanding of the procedure matches consent given  Procedure consent: procedure consent matches procedure scheduled  Relevant documents: relevant documents present and verified  Test results: test results available and properly labeled  Patient identity confirmed: verbally with patient    Procedure:     Removed with no complications: yes      Other reason for removal:  Resumption of menstrual bleeding. Comments:      Speculum was placed after bimanual exam was done. Cervix cleansed with Betadine. IUD string was grasped with ring forcep and removed without difficulty. Tenaculum placed on anterior cervix. Uterus sounded to 7.5 cm. Mirena IUD was adjusted to this length and placed without difficulty. IUD string cut to a length of 2 to 2.5 cm. Gentle bimanual exam with good positioning noted. Patient tolerated procedure well. Pelvic rest recommended for the next 5 to 7 days. Follow-up 1 month for IUD check.

## 2023-10-31 NOTE — PROGRESS NOTES
Iud insertions    Date/Time: 10/31/2023 4:10 PM    Performed by: Hai Pierson MD  Authorized by: Hai Pierson MD    Other Assisting Provider: No    Verbal consent obtained?: Yes    Written consent obtained?: Yes    Risks and benefits: Risks, benefits and alternatives were discussed    Consent given by:  Patient  Time Out:     Time out: Immediately prior to the procedure a time out was called      Time out performed at:  10/31/2023 4:10 PM  Patient states understanding of procedure being performed: Yes    Patient's understanding of procedure matches consent: Yes    Procedure consent matches procedure scheduled: Yes    Relevant documents present and verified: Yes    Test results available and properly labeled: Yes    Patient identity confirmed:  Verbally with patient  Procedure:     Cervix cleaned and prepped: yes      Speculum placed in vagina: yes      Tenaculum applied to cervix: yes      Uterus sounded: yes      Uterus sound depth (cm):  7.5    IUD type:  Mirena  Post-procedure:     Patient tolerated procedure well: yes      Patient will follow up after next period: yes

## 2023-10-31 NOTE — PROGRESS NOTES
Assessment/Plan   Diagnoses and all orders for this visit:    Encounter for surveillance of other contraceptive  -     levonorgestrel (MIRENA) IUD 20 mcg/day    Encounter for IUD removal and reinsertion  -     Iud insertions  -     Iud removal    1. Mirena IUD removal with reinsertion-patient tolerated procedure well. Initial IUD was placed 6/26/2017. Initially, she had rare menstrual bleeding related to this. Over the last few months, has had more regular bleeding with cramps and discomfort. Counseled that IUD is approved by the FDA for 5 years for menorrhagia and 8 years for contraception. She wished to have this removed with reinsertion which was done today. Patient tolerated the procedure well. IUD string was cut to a length of 2.5 cm. To return in 1 month time for exam.  2. history of positive HPV/low-grade dysplasia-had negative Pap with positive HPV from 3/30/2023. Colposcopy 4/6/23 with negative ECC, 9, and 12:00 biopsy with JUDE-1 noted at 6:00. Given lack of high-grade changes, expectant management was employed. Of note, status post LEEP cone biopsy 10/20/2017 with JUDE-2 to 3 noted at that time. Had negative testing until the above-noted findings. 3. history of pelvic pain/breast tenderness/irregular menses/condyloma/labial cyst-no current concerns. 4. status post laparoscopic bilateral salpingectomy  5. history of endometriosis-noted at laparoscopy with bilateral salpingectomy from surgery April 2019. Ovarian cyst was removed at that time with findings consistent with endometriosis. She has not started to note some discomfort with the bleeding, IUD replaced as noted above. 6.  Other-no STD concerns  Follow-up 1 month for IUD check. Subjective   Patient ID: Jazzy Gimenez is a 41 y.o. female.     Vitals:    10/31/23 1602   BP: 118/64     HPI    The following portions of the patient's history were reviewed and updated as appropriate: allergies, current medications, past family history, past medical history, past social history, past surgical history, and problem list.  Past Medical History:   Diagnosis Date    Abnormal Pap smear of cervix     Adnexal fullness     Amenorrhea     Anxiety     Arthralgia of toe     Endometriosis 2020    Exposure to parvovirus     GERD (gastroesophageal reflux disease)     during pregnancy    HPV (human papilloma virus) infection     Nephrolithiasis     Umbilical hernia     Urinary tract infection      Past Surgical History:   Procedure Laterality Date    CERVICAL BIOPSY  W/ LOOP ELECTRODE EXCISION      COLPOSCOPY W/ BIOPSY / CURETTAGE      OSTEOTOMY AND ULNAR SHORTENING Right     RI LAPS ABD PRTM&OMENTUM DX W/WO SPEC BR/WA SPX Bilateral 2019    Procedure: DIAGNOSTIC LAP; BILATERAL SALPINGECTOMY; OVARIAN CYSTECTOMY, ECSISION PELVIC CYST;  Surgeon: Jai Estrada MD;  Location: [facility];  Service: Gynecology    TOOTH EXTRACTION      [place] teeth    WRIST SURGERY       OB History    Para Term  AB Living   2 2 2 0 0 2   SAB IAB Ectopic Multiple Live Births   0 0 0 0 2      # Outcome Date GA Lbr Kennedy/2nd Weight Sex Delivery Anes PTL Lv   2 Term 17 38w2d / 00:06 3515 g (7 lb 12 oz) M Vag-Spont None N YUSUF   1 Term                Current Outpatient Medications:     albuterol (PROVENTIL HFA,VENTOLIN HFA) 90 mcg/act inhaler, Inhale 2 puffs every 6 (six) hours as needed for wheezing or shortness of breath, Disp: 8 g, Rfl: 0    benzonatate (TESSALON) 200 MG capsule, Take 1 capsule (200 mg total) by mouth 3 (three) times a day as needed for cough, Disp: 20 capsule, Rfl: 0    fluticasone (FLONASE) 50 mcg/act nasal spray, 2 sprays into each nostril daily, Disp: 9.9 mL, Rfl: 1    levonorgestrel (MIRENA) 20 MCG/24HR IUD, 1 each by Intrauterine route once , Disp: , Rfl:     methylPREDNISolone 4 MG tablet therapy pack, Use as directed on package, Disp: 21 each, Rfl: 0    azelastine (ASTELIN) 0.1 % nasal spray, 1 spray into each nostril 2 (two) times a day Use in each nostril as directed, Disp: 5 mL, Rfl: 3    pantoprazole (PROTONIX) 40 mg tablet, Take 1 tablet (40 mg total) by mouth daily, Disp: 30 tablet, Rfl: 0    Current Facility-Administered Medications:     levonorgestrel (MIRENA) IUD 20 mcg/day, 1 each, Intrauterine, Once, Ezequiel Paul MD  Allergies   Allergen Reactions    Pollen Extract     Nickel Rash     Some earrings will get rash and very sore     Social History     Socioeconomic History    Marital status: /Civil Union     Spouse name: None    Number of children: None    Years of education: None    Highest education level: None   Occupational History    None   Tobacco Use    Smoking status: Never    Smokeless tobacco: Never   Vaping Use    Vaping Use: Never used   Substance and Sexual Activity    Alcohol use: Not Currently     Comment: occasional    Drug use: No    Sexual activity: Yes     Partners: Male   Other Topics Concern    None   Social History Narrative    Caffeine use    Contraceptive vaginal ring    One child    [employer] affiliation- Catholic     uses safety equipment- seatbelt     Social Determinants of Health     Financial Resource Strain: Not on file   Food Insecurity: Not on file   Transportation Needs: Not on file   Physical Activity: Not on file   Stress: Not on file   Social Connections: Not on file   Intimate Partner Violence: Not on file   Housing Stability: Not on file     Family History   Problem Relation Age of Onset    Diabetes Maternal Grandmother     Heart disease Paternal Grandfather     Hypertension Paternal Grandfather     Hyperlipidemia Paternal Grandfather     Hypertension Father     Hyperlipidemia Father     Heart disease Maternal Grandfather     Diabetes Paternal Grandmother     Diabetes Family        Review of Systems    Objective   Physical Exam  OBGyn Exam     Objective      /64   Wt 55.9 kg (123 lb 3.2 oz)   BMI 22.53 kg/m²     General:   alert and oriented, in no acute distress   Neck:    Breast: Heart:    Lungs:    Abdomen: soft, non-tender, without masses or organomegaly   Vulva: normal   Vagina: Without erythema or lesions or discharge. Normal   Cervix: Without lesions or discharge or cervicitis.   No Cervical motion tenderness   Uterus: top normal size, anteverted, non-tender   Adnexa: no mass, fullness, tenderness   Rectum: deferred    Psych:  Normal mood and affect   Skin:  Without obvious lesions   Eyes: symmetric, with normal movements and reactivity   Musculoskeletal:  Normal muscle tone and movements appreciated

## 2023-12-01 ENCOUNTER — APPOINTMENT (EMERGENCY)
Dept: NON INVASIVE DIAGNOSTICS | Facility: HOSPITAL | Age: 38
End: 2023-12-01
Payer: COMMERCIAL

## 2023-12-01 ENCOUNTER — HOSPITAL ENCOUNTER (EMERGENCY)
Facility: HOSPITAL | Age: 38
Discharge: HOME | End: 2023-12-01
Attending: EMERGENCY MEDICINE
Payer: COMMERCIAL

## 2023-12-01 ENCOUNTER — APPOINTMENT (EMERGENCY)
Dept: RADIOLOGY | Facility: HOSPITAL | Age: 38
End: 2023-12-01
Payer: COMMERCIAL

## 2023-12-01 VITALS
DIASTOLIC BLOOD PRESSURE: 74 MMHG | WEIGHT: 125.44 LBS | BODY MASS INDEX: 22.94 KG/M2 | OXYGEN SATURATION: 98 % | HEART RATE: 89 BPM | SYSTOLIC BLOOD PRESSURE: 129 MMHG | TEMPERATURE: 98.7 F | RESPIRATION RATE: 16 BRPM

## 2023-12-01 DIAGNOSIS — M25.571 RIGHT ANKLE PAIN: Primary | ICD-10-CM

## 2023-12-01 PROCEDURE — 93971 EXTREMITY STUDY: CPT

## 2023-12-01 PROCEDURE — 73610 X-RAY EXAM OF ANKLE: CPT

## 2023-12-01 PROCEDURE — 99284 EMERGENCY DEPT VISIT MOD MDM: CPT

## 2023-12-01 PROCEDURE — 73590 X-RAY EXAM OF LOWER LEG: CPT

## 2023-12-01 PROCEDURE — 99285 EMERGENCY DEPT VISIT HI MDM: CPT

## 2023-12-01 NOTE — ED PROVIDER NOTES
History  Chief Complaint   Patient presents with    Ankle Pain     Ankle pain, swelling, bruising, denies injury. Started last night. Patient is a 53-year-old female without significant past medical history presenting for evaluation of right ankle bruising for 1 day. She noticed mild bruising of the lateral lower leg last night and it progressively worsened throughout the day today. She has tenderness to the area. She denies any injury. No significant leg swelling or erythema. No calf tenderness. No numbness or tingling of the foot. No blood thinning medications. No other atraumatic bruising. Ankle Pain  Associated symptoms: no back pain and no fever        Prior to Admission Medications   Prescriptions Last Dose Informant Patient Reported? Taking?    albuterol (PROVENTIL HFA,VENTOLIN HFA) 90 mcg/act inhaler   No No   Sig: Inhale 2 puffs every 6 (six) hours as needed for wheezing or shortness of breath   azelastine (ASTELIN) 0.1 % nasal spray   No No   Si spray into each nostril 2 (two) times a day Use in each nostril as directed   benzonatate (TESSALON) 200 MG capsule   No No   Sig: Take 1 capsule (200 mg total) by mouth 3 (three) times a day as needed for cough   fluticasone (FLONASE) 50 mcg/act nasal spray   No No   Si sprays into each nostril daily   levonorgestrel (MIRENA) 20 MCG/24HR IUD  Self Yes No   Si each by Intrauterine route once    methylPREDNISolone 4 MG tablet therapy pack   No No   Sig: Use as directed on package   pantoprazole (PROTONIX) 40 mg tablet   No No   Sig: Take 1 tablet (40 mg total) by mouth daily      Facility-Administered Medications: None       Past Medical History:   Diagnosis Date    Abnormal Pap smear of cervix     Adnexal fullness     Amenorrhea     Anxiety     Arthralgia of toe     Endometriosis 2020    Exposure to parvovirus     GERD (gastroesophageal reflux disease)     during pregnancy    HPV (human papilloma virus) infection Nephrolithiasis     Umbilical hernia     Urinary tract infection        Past Surgical History:   Procedure Laterality Date    CERVICAL BIOPSY  W/ LOOP ELECTRODE EXCISION      COLPOSCOPY W/ BIOPSY / CURETTAGE      OSTEOTOMY AND ULNAR SHORTENING Right     CO LAPS ABD PRTM&OMENTUM DX W/WO SPEC BR/WA SPX Bilateral 4/16/2019    Procedure: DIAGNOSTIC LAP; BILATERAL SALPINGECTOMY; OVARIAN CYSTECTOMY, ECSISION PELVIC CYST;  Surgeon: Tristen Solis MD;  Location[facility];  Service: Gynecology    TOOTH EXTRACTION      [place] teeth    WRIST SURGERY         Family History   Problem Relation Age of Onset    Diabetes Maternal Grandmother     Heart disease Paternal Grandfather     Hypertension Paternal Grandfather     Hyperlipidemia Paternal Grandfather     Hypertension Father     Hyperlipidemia Father     Heart disease Maternal Grandfather     Diabetes Paternal Grandmother     Diabetes Family      I have reviewed and agree with the history as documented. E-Cigarette/Vaping    E-Cigarette Use Never User      E-Cigarette/Vaping Substances    Nicotine No     THC No     CBD No     Flavoring No     Other No     Unknown No      Social History     Tobacco Use    Smoking status: Never    Smokeless tobacco: Never   Vaping Use    Vaping Use: Never used   Substance Use Topics    Alcohol use: Not Currently     Comment: occasional    Drug use: No       Review of Systems   Constitutional:  Negative for chills and fever. HENT:  Negative for ear pain and sore throat. Eyes:  Negative for pain and visual disturbance. Respiratory:  Negative for cough and shortness of breath. Cardiovascular:  Negative for chest pain and palpitations. Gastrointestinal:  Negative for abdominal pain and vomiting. Genitourinary:  Negative for dysuria and hematuria. Musculoskeletal:  Negative for arthralgias and back pain. Skin:  Negative for color change and rash. Neurological:  Negative for seizures and syncope.    All other systems reviewed and are negative. Physical Exam  Physical Exam  Vitals and nursing note reviewed. Constitutional:       General: She is not in acute distress. Appearance: Normal appearance. HENT:      Head: Normocephalic and atraumatic. Right Ear: External ear normal.      Left Ear: External ear normal.      Nose: Nose normal.      Mouth/Throat:      Mouth: Mucous membranes are moist.   Eyes:      General: No scleral icterus. Right eye: No discharge. Left eye: No discharge. Extraocular Movements: Extraocular movements intact. Conjunctiva/sclera: Conjunctivae normal.   Cardiovascular:      Rate and Rhythm: Normal rate and regular rhythm. Pulses: Normal pulses. Heart sounds: Normal heart sounds. Pulmonary:      Effort: Pulmonary effort is normal. No respiratory distress. Breath sounds: Normal breath sounds. Abdominal:      Palpations: Abdomen is soft. Tenderness: There is no abdominal tenderness. Musculoskeletal:         General: No tenderness, deformity or signs of injury. Cervical back: Normal range of motion and neck supple. Right ankle: Ecchymosis present. Comments: Bruising on lateral lower right leg with mild associated tenderness. No erythema or swelling of leg or foot. No ankle or foot tenderness. Normal AROM. Normal sensation, pulses. Skin:     General: Skin is dry. Coloration: Skin is not jaundiced. Findings: Bruising present. No erythema or rash. Neurological:      General: No focal deficit present. Mental Status: She is alert and oriented to person, place, and time. Mental status is at baseline. Motor: No weakness. Gait: Gait normal.   Psychiatric:         Mood and Affect: Mood normal.         Behavior: Behavior normal.         Thought Content:  Thought content normal.         Vital Signs  ED Triage Vitals [12/01/23 1749]   Temperature Pulse Respirations Blood Pressure SpO2   98.7 °F (37.1 °C) 89 16 129/74 98 % Temp Source Heart Rate Source Patient Position - Orthostatic VS BP Location FiO2 (%)   Oral -- -- -- --      Pain Score       No Pain           Vitals:    12/01/23 1749   BP: 129/74   Pulse: 89         Visual Acuity      ED Medications  Medications - No data to display    Diagnostic Studies  Results Reviewed       None                   XR ankle 3+ views RIGHT    (Results Pending)   XR tibia fibula 2 views RIGHT    (Results Pending)   VAS lower limb venous duplex study, unilateral/limited    (Results Pending)              Procedures  Procedures         ED Course  ED Course as of 12/01/23 2003   Fri Dec 01, 2023   1838 Negative for DVT per [address] Medical Decision Making  Patient is a 51-year-old female presenting for evaluation of atraumatic bruising of the right lower leg/ankle. All vitals stable on arrival.  See physical exam for remarkable findings. Will obtain x-ray to rule out underlying fracture. Patient expressed concern for DVT so we will obtain ultrasound to evaluate for this. No other abnormal bruising concerning for coagulopathy at this time so no indication for labs. Ultrasound negative for DVT per the vascular tech. X-ray of tib/fib and ankle negative for acute pathology as interpreted myself. Discussed reassuring workup with patient. Possibility of trauma without any nausea causing the bruising. Discussed that she should continue monitoring for continued atraumatic bruising follow-up with PCP if symptoms persist.    I have discussed findings and plan for discharge with the patient/caregiver. Follow up with the appropriate providers including primary care physician was discussed. Return precautions discussed with patient/caregiver as outlined in AVS. Patient/caregiver verbally expressed understanding. Patient stable at time of discharge and ambulated out of the emergency department.        Amount and/or Complexity of Data Reviewed  Radiology: ordered. Disposition  Final diagnoses:   Right ankle pain     Time reflects when diagnosis was documented in both MDM as applicable and the Disposition within this note       Time User Action Codes Description Comment    12/1/2023  7:22 PM Bacilio Gallegos Add [M25.571] Right ankle pain           ED Disposition       ED Disposition   Discharge    Condition   Stable    Date/Time   Fri Dec 1, 2023  7:22 PM    Comment   Mimi Omlly discharge to home/self care.                    Follow-up Information       Follow up With Specialties Details Why Contact Info Additional [address] [facility] Orthopedic Surgery Schedule an appointment as soon as possible for a visit   [address] includes the [facility] [place].  [address] [address] [phone] [address] [facility], [address] [place], [place] [address], [address] [place], [place], [address] [phone]            Discharge Medication List as of 12/1/2023  7:25 PM        CONTINUE these medications which have NOT CHANGED    Details   albuterol (PROVENTIL HFA,VENTOLIN HFA) 90 mcg/act inhaler Inhale 2 puffs every 6 (six) hours as needed for wheezing or shortness of breath, Starting Sat 4/1/2023, Normal      azelastine (ASTELIN) 0.1 % nasal spray 1 spray into each nostril 2 (two) times a day Use in each nostril as directed, Starting Wed 4/19/2023, Until Fri 5/19/2023, Normal      benzonatate (TESSALON) 200 MG capsule Take 1 capsule (200 mg total) by mouth 3 (three) times a day as needed for cough, Starting Sat 4/1/2023, Normal      fluticasone (FLONASE) 50 mcg/act nasal spray 2 sprays into each nostril daily, Starting Fri 4/7/2023, Normal      levonorgestrel (MIRENA) 20 MCG/24HR IUD 1 each by Intrauterine route once , Historical Med      methylPREDNISolone 4 MG tablet therapy pack Use as directed on package, Normal      pantoprazole (PROTONIX) 40 mg tablet Take 1 tablet (40 mg total) by mouth daily, Starting Wed 4/19/2023, Until Fri 5/19/2023, Normal             No discharge procedures on file.     PDMP Review       None            ED Provider  Electronically Signed by             Amanda Harris PA-C  12/01/23 2003

## 2023-12-02 PROCEDURE — 93971 EXTREMITY STUDY: CPT | Performed by: SURGERY

## 2023-12-04 ENCOUNTER — VBI (OUTPATIENT)
Dept: ADMINISTRATIVE | Facility: OTHER | Age: 38
End: 2023-12-04

## 2023-12-04 NOTE — TELEPHONE ENCOUNTER
12/04/23 1:28 PM    Patient contacted post ED visit, first outreach attempt made. Message was left for patient to return a call to the VBI Department at [facility]: Phone [phone]. Thank you.   Mally Enamorado  [facility]

## 2023-12-04 NOTE — LETTER
VALUE BASED VIR  [place] [address]    Date: 12/06/23  [facility] [address]    Dear Kendall Andrews:                                                                                                                              Thank you for choosing [facility]'s emergency department for care. Your primary care provider wants to make sure that your ongoing medical care is being addressed. If you require follow up care as a result of your emergency department visit, there are a few things the practice would like you to know. As part of the network's continuing commitment to caring for our patients, we have added more same day appointments and have extended office hours to meet your medical needs. After hours, on-call physicians are available via your primary care provider's main office line. We encourage you to contact our office prior to seeking treatment to discuss your symptoms with the medical staff. Together, we can determine the correct course of action. A majority of non-emergent conditions such as: common cold, flu-like symptoms, fevers, strains/sprains, dislocations, minor burns, cuts and animal bites can be treated at [facility] facilities. Diagnostic testing is available at some sites. Of course, if you are experiencing a life threatening medical emergency call 911 or proceed directly to the nearest emergency room. Your nearest [facility] facility is conveniently located at:    [facility]  [address] [facility] [address] [place] [address], [address] [place] [address]  [phone] [address] offered at [address] your spot online at [email or web address] or on the [address]    Sincerely,    [facility]  No information on file.

## 2023-12-05 ENCOUNTER — OFFICE VISIT (OUTPATIENT)
Dept: GYNECOLOGY | Facility: CLINIC | Age: 38
End: 2023-12-05
Payer: COMMERCIAL

## 2023-12-05 VITALS — WEIGHT: 126.6 LBS | BODY MASS INDEX: 23.16 KG/M2 | DIASTOLIC BLOOD PRESSURE: 64 MMHG | SYSTOLIC BLOOD PRESSURE: 118 MMHG

## 2023-12-05 DIAGNOSIS — N92.6 IRREGULAR MENSES: ICD-10-CM

## 2023-12-05 DIAGNOSIS — Z97.5 IUD CONTRACEPTION: Primary | ICD-10-CM

## 2023-12-05 PROBLEM — Z30.9 ENCOUNTER FOR CONTRACEPTIVE MANAGEMENT: Status: RESOLVED | Noted: 2019-02-11 | Resolved: 2023-12-05

## 2023-12-05 PROBLEM — N83.202 LEFT OVARIAN CYST: Status: RESOLVED | Noted: 2019-02-11 | Resolved: 2023-12-05

## 2023-12-05 PROBLEM — O99.619 GASTROESOPHAGEAL REFLUX IN PREGNANCY: Status: RESOLVED | Noted: 2017-03-13 | Resolved: 2023-12-05

## 2023-12-05 PROBLEM — K21.9 GASTROESOPHAGEAL REFLUX IN PREGNANCY: Status: RESOLVED | Noted: 2017-03-13 | Resolved: 2023-12-05

## 2023-12-05 PROCEDURE — 99213 OFFICE O/P EST LOW 20 MIN: CPT | Performed by: OBSTETRICS & GYNECOLOGY

## 2023-12-05 NOTE — PROGRESS NOTES
Assessment/Plan   Diagnoses and all orders for this visit:    IUD contraception    Irregular menses    1. status post Mirena removal with reinsertion-done 10/31/2023. Prior IUD was placed 6/26/2017. She has had some irregular bleeding with some breast tenderness. Offered breast exam, she deferred this today. With the first IUD, she had outbreak of skin on her forehead but this has not happened this time. Overall, she is feeling well. She will continue with the device. IUD strings seen at a length of about 2 to 2.5 cm. To follow-up April 2024 for yearly exam or as needed. 2.  Irregular bleeding-likely related to recent Mirena IUD insertion. To call return with menometrorrhagia. 3.  History of positive HPV/low-grade dysplasia-had negative Pap but positive HPV from 3/30/2023. Colposcopy 4/6/2023 with JUDE-1 noted at 6:00 with negative biopsies elsewhere. She will continue with expectant management. Of note, is status post LEEP cone biopsy 10/20/2017 with JUDE 2-3 at that time. 4. history of pelvic pain/condyloma/labial cyst-no current concerns  5. status post laparoscopic bilateral salpingectomy  6. history of endometriosis-noted laparoscopy with bilateral salpingectomy from surgery April 2019. Ovarian cyst was removed at that time with findings consistent with endometriosis. 7. breast tenderness-patient thinks it is related to the new IUD. She will call return with any masses or focal findings. 8.  History of ovarian cyst-had removal of left ovarian cyst with surgery 4/16/2019. Ultrasound 9/23/2021 with 16 mm involuting right ovarian cyst with minimal fluid. Exam is entirely benign today. To follow clinically at this time. Follow-up April 2024 for yearly exam with Pap/HPV or as needed. Subjective   Patient ID: Edel Delgado is a 41 y.o. female. Vitals:    12/05/23 1604   BP: 118/64     Patient was seen today for follow-up visit. Please see assessment plan for details.       The following portions of the patient's history were reviewed and updated as appropriate: allergies, current medications, past family history, past medical history, past social history, past surgical history, and problem list.  Past Medical History:   Diagnosis Date    Abnormal Pap smear of cervix     Adnexal fullness     Amenorrhea     Anxiety     Arthralgia of toe     Endometriosis 2020    Exposure to parvovirus     GERD (gastroesophageal reflux disease)     during pregnancy    HPV (human papilloma virus) infection     Nephrolithiasis     Umbilical hernia     Urinary tract infection      Past Surgical History:   Procedure Laterality Date    CERVICAL BIOPSY  W/ LOOP ELECTRODE EXCISION      COLPOSCOPY W/ BIOPSY / CURETTAGE      OSTEOTOMY AND ULNAR SHORTENING Right     NM LAPS ABD PRTM&OMENTUM DX W/WO SPEC BR/WA SPX Bilateral 2019    Procedure: DIAGNOSTIC LAP; BILATERAL SALPINGECTOMY; OVARIAN CYSTECTOMY, ECSISION PELVIC CYST;  Surgeon: Norma Browning MD;  Location: [facility];  Service: Gynecology    TOOTH EXTRACTION      [place] teeth    WRIST SURGERY       OB History    Para Term  AB Living   2 2 2 0 0 2   SAB IAB Ectopic Multiple Live Births   0 0 0 0 2      # Outcome Date GA Lbr Kennedy/2nd Weight Sex Delivery Anes PTL Lv   2 Term 17 38w2d / 00:06 3515 g (7 lb 12 oz) M Vag-Spont None N YUSUF   1 Term                Current Outpatient Medications:     albuterol (PROVENTIL HFA,VENTOLIN HFA) 90 mcg/act inhaler, Inhale 2 puffs every 6 (six) hours as needed for wheezing or shortness of breath, Disp: 8 g, Rfl: 0    benzonatate (TESSALON) 200 MG capsule, Take 1 capsule (200 mg total) by mouth 3 (three) times a day as needed for cough, Disp: 20 capsule, Rfl: 0    fluticasone (FLONASE) 50 mcg/act nasal spray, 2 sprays into each nostril daily, Disp: 9.9 mL, Rfl: 1    levonorgestrel (MIRENA) 20 MCG/24HR IUD, 1 each by Intrauterine route once , Disp: , Rfl:     methylPREDNISolone 4 MG tablet therapy pack, Use as directed on package, Disp: 21 each, Rfl: 0    azelastine (ASTELIN) 0.1 % nasal spray, 1 spray into each nostril 2 (two) times a day Use in each nostril as directed, Disp: 5 mL, Rfl: 3    pantoprazole (PROTONIX) 40 mg tablet, Take 1 tablet (40 mg total) by mouth daily, Disp: 30 tablet, Rfl: 0  Allergies   Allergen Reactions    Pollen Extract     Nickel Rash     Some earrings will get rash and very sore     Social History     Socioeconomic History    Marital status: /Civil Union     Spouse name: None    Number of children: None    Years of education: None    Highest education level: None   Occupational History    None   Tobacco Use    Smoking status: Never    Smokeless tobacco: Never   Vaping Use    Vaping Use: Never used   Substance and Sexual Activity    Alcohol use: Not Currently     Comment: occasional    Drug use: No    Sexual activity: Yes     Partners: Male   Other Topics Concern    None   Social History Narrative    Caffeine use    Contraceptive vaginal ring    One child    [employer] affiliation- [employer]     uses safety equipment- seatbelt     Social Determinants of Health     Financial Resource Strain: Not on file   Food Insecurity: Not on file   Transportation Needs: Not on file   Physical Activity: Not on file   Stress: Not on file   Social Connections: Not on file   Intimate Partner Violence: Not on file   Housing Stability: Not on file     Family History   Problem Relation Age of Onset    Diabetes Maternal Grandmother     Heart disease Paternal Grandfather     Hypertension Paternal Grandfather     Hyperlipidemia Paternal Grandfather     Hypertension Father     Hyperlipidemia Father     Heart disease Maternal Grandfather     Diabetes Paternal Grandmother     Diabetes Family        Review of Systems   Constitutional:  Negative for chills, diaphoresis, fatigue and fever. Respiratory:  Negative for apnea, cough, chest tightness, shortness of breath and wheezing.     Cardiovascular:  Negative for chest pain, palpitations and leg swelling. Gastrointestinal:  Negative for abdominal distention, abdominal pain, anal bleeding, constipation, diarrhea, nausea, rectal pain and vomiting. Genitourinary:  Negative for difficulty urinating, dyspareunia, dysuria, frequency, hematuria, menstrual problem, pelvic pain, urgency, vaginal bleeding, vaginal discharge and vaginal pain. Musculoskeletal:  Negative for arthralgias, back pain and myalgias. Skin:  Negative for color change and rash. Neurological:  Negative for dizziness, syncope, light-headedness, numbness and headaches. Hematological:  Negative for adenopathy. Does not bruise/bleed easily. Psychiatric/Behavioral:  Negative for dysphoric mood and sleep disturbance. The patient is not nervous/anxious. Objective   Physical Exam  OBGyn Exam     Objective      /64 (BP Location: Left arm, Patient Position: Sitting)   Wt 57.4 kg (126 lb 9.6 oz)   LMP 11/15/2023 (Approximate)   BMI 23.16 kg/m²     General:   alert and oriented, in no acute distress   Neck:    Breast:    Heart:    Lungs:    Abdomen: soft, non-tender, without masses or organomegaly   Vulva: normal   Vagina: Without erythema or lesions or discharge. Normal with scant amount of brown discharge   Cervix: Without lesions or discharge or cervicitis. No Cervical motion tenderness scant amount of brown discharge. IUD strings seen at the length of 2 to 2.5 cm.    Uterus: top normal size, anteverted, non-tender   Adnexa: no mass, fullness, tenderness   Rectum: deferred    Psych:  Normal mood and affect   Skin:  Without obvious lesions   Eyes: symmetric, with normal movements and reactivity   Musculoskeletal:  Normal muscle tone and movements appreciated

## 2023-12-06 NOTE — TELEPHONE ENCOUNTER
12/06/23 3:27 PM    Patient contacted post ED visit, third outreach attempt made. Message was left for patient to return a call to either the VBI Department at [address] [place] [address]: Phone [phone] or the PCP office. Thank you.   Mally Enamorado  [facility]

## 2023-12-06 NOTE — TELEPHONE ENCOUNTER
12/06/23 2:55 PM    Patient contacted post ED visit, second outreach attempt made. Message was left for patient to return a call to the VBI Department at [facility]: Phone [phone]. Thank you.   Mally Enamorado  [facility]

## 2023-12-27 ENCOUNTER — APPOINTMENT (OUTPATIENT)
Dept: RADIOLOGY | Facility: CLINIC | Age: 38
End: 2023-12-27
Payer: COMMERCIAL

## 2023-12-27 ENCOUNTER — OFFICE VISIT (OUTPATIENT)
Dept: URGENT CARE | Facility: CLINIC | Age: 38
End: 2023-12-27
Payer: COMMERCIAL

## 2023-12-27 VITALS
WEIGHT: 128 LBS | TEMPERATURE: 98.3 F | OXYGEN SATURATION: 99 % | BODY MASS INDEX: 23.55 KG/M2 | DIASTOLIC BLOOD PRESSURE: 66 MMHG | RESPIRATION RATE: 18 BRPM | SYSTOLIC BLOOD PRESSURE: 110 MMHG | HEIGHT: 62 IN | HEART RATE: 83 BPM

## 2023-12-27 DIAGNOSIS — M79.671 RIGHT FOOT PAIN: Primary | ICD-10-CM

## 2023-12-27 DIAGNOSIS — M79.671 RIGHT FOOT PAIN: ICD-10-CM

## 2023-12-27 PROCEDURE — 73630 X-RAY EXAM OF FOOT: CPT

## 2023-12-27 PROCEDURE — 99213 OFFICE O/P EST LOW 20 MIN: CPT | Performed by: PHYSICIAN ASSISTANT

## 2023-12-27 NOTE — PROGRESS NOTES
[facility]      NAME: Radha Olivas is a 38 y.o. female  : 1985    MRN: [id number]  DATE: 2023  TIME: 1:14 PM    Assessment and Plan   Right foot pain [M79.671]  1. Right foot pain  XR foot 3+ vw right          Patient Instructions   Xray appears negative for any fracture.  Will follow up with radiologist report when available.  Recommend elevating body part, icing the area every 2 hours for 20-30 minutes, take Ibuprofen every 6-8 hours to reduce inflammation.  If not improving over the next week, follow up with PCP or orthopedics.    To present to the ER if symptoms worsen.  Chief Complaint     Chief Complaint   Patient presents with    Foot Pain     Right foot pain. No trauma to the foot. Started 2 night ago. She wants an xray.          History of Present Illness   Radha Olivas presents to the clinic with mother c/o    Leg Pain   The incident occurred 2 days ago. The incident occurred at home. There was no injury mechanism. The pain is present in the right foot (reports she felt a lump on top of foot but seems to have improved today). The quality of the pain is described as aching. The pain is mild. The pain has been Constant since onset. Pertinent negatives include no inability to bear weight. The symptoms are aggravated by palpation. She has tried NSAIDs for the symptoms. The treatment provided mild relief.   Denies any trauma or increased physical activity over the past week.     Review of Systems   Review of Systems   Constitutional:  Negative for chills, diaphoresis, fatigue and fever.   HENT:  Negative for congestion, ear discharge, ear pain and facial swelling.    Eyes:  Negative for photophobia, pain, discharge, redness, itching and visual disturbance.   Respiratory:  Negative for apnea, cough, chest tightness, shortness of breath and wheezing.    Cardiovascular:  Negative for chest pain and palpitations.   Gastrointestinal:  Negative for abdominal pain.   Musculoskeletal:   Positive for arthralgias.   Skin:  Negative for color change, rash and wound.   Neurological:  Negative for dizziness and headaches.   Hematological:  Negative for adenopathy.         Current Medications     Long-Term Medications   Medication Sig Dispense Refill    levonorgestrel (MIRENA) 20 MCG/24HR IUD 1 each by Intrauterine route once       azelastine (ASTELIN) 0.1 % nasal spray 1 spray into each nostril 2 (two) times a day Use in each nostril as directed 5 mL 3    fluticasone (FLONASE) 50 mcg/act nasal spray 2 sprays into each nostril daily (Patient not taking: Reported on 12/27/2023) 9.9 mL 1    pantoprazole (PROTONIX) 40 mg tablet Take 1 tablet (40 mg total) by mouth daily 30 tablet 0       Current Allergies     Allergies as of 12/27/2023 - Reviewed 12/27/2023   Allergen Reaction Noted    Pollen extract  09/11/2013    Nickel Rash 04/16/2019            The following portions of the patient's history were reviewed and updated as appropriate: allergies, current medications, past family history, past medical history, past social history, past surgical history and problem list.  Past Medical History:   Diagnosis Date    Abnormal Pap smear of cervix     Adnexal fullness     Amenorrhea     Anxiety     Arthralgia of toe     Endometriosis 11/17/2020    Exposure to parvovirus     GERD (gastroesophageal reflux disease)     during pregnancy    HPV (human papilloma virus) infection     Nephrolithiasis     Umbilical hernia     Urinary tract infection      Past Surgical History:   Procedure Laterality Date    CERVICAL BIOPSY  W/ LOOP ELECTRODE EXCISION      COLPOSCOPY W/ BIOPSY / CURETTAGE      OSTEOTOMY AND ULNAR SHORTENING Right     [place] LAPS ABD PRTM&OMENTUM DX W/WO SPEC BR/WA SPX Bilateral 4/16/2019    Procedure: DIAGNOSTIC LAP; BILATERAL SALPINGECTOMY; OVARIAN CYSTECTOMY, ECSISION PELVIC CYST;  Surgeon: Michel Machado MD;  Location: [facility];  Service: Gynecology    TOOTH EXTRACTION      [place] teeth    WRIST SURGERY    "    Social History     Socioeconomic History    Marital status: /Civil Union     Spouse name: Not on file    Number of children: Not on file    Years of education: Not on file    Highest education level: Not on file   Occupational History    Not on file   Tobacco Use    Smoking status: Never    Smokeless tobacco: Never   Vaping Use    Vaping status: Never Used   Substance and Sexual Activity    Alcohol use: Not Currently     Comment: occasional    Drug use: No    Sexual activity: Yes     Partners: Male   Other Topics Concern    Not on file   Social History Narrative    Caffeine use    Contraceptive vaginal ring    One child    Church affiliation- Nonden[employer]     uses safety equipment- seatbelt     Social Determinants of Health     Financial Resource Strain: Not on file   Food Insecurity: Not on file   Transportation Needs: Not on file   Physical Activity: Not on file   Stress: Not on file   Social Connections: Not on file   Intimate Partner Violence: Not on file   Housing Stability: Not on file       Objective   /66   Pulse 83   Temp 98.3 °F (36.8 °C)   Resp 18   Ht 5' 2\" (1.575 m)   Wt 58.1 kg (128 lb)   LMP 11/15/2023 (Approximate)   SpO2 99%   BMI 23.41 kg/m²      Physical Exam     Physical Exam  Vitals and nursing note reviewed.   Constitutional:       General: She is not in acute distress.     Appearance: She is well-developed. She is not diaphoretic.   HENT:      Head: Normocephalic and atraumatic.      Right Ear: External ear normal.      Left Ear: External ear normal.      Nose: Nose normal.      Mouth/Throat:      Mouth: Mucous membranes are moist.      Pharynx: No oropharyngeal exudate or posterior oropharyngeal erythema.   Eyes:      General: No scleral icterus.        Right eye: No discharge.         Left eye: No discharge.      Conjunctiva/sclera: Conjunctivae normal.   Cardiovascular:      Rate and Rhythm: Normal rate and regular rhythm.      Heart sounds: Normal heart sounds. No " murmur heard.     No friction rub. No gallop.   Pulmonary:      Effort: Pulmonary effort is normal. No respiratory distress.      Breath sounds: Normal breath sounds. No decreased breath sounds, wheezing, rhonchi or rales.   Musculoskeletal:      Right foot: Normal. Normal range of motion and normal capillary refill. No swelling, deformity, tenderness or bony tenderness. Normal pulse.   Skin:     General: Skin is warm and dry.      Coloration: Skin is not pale.      Findings: No erythema or rash.   Neurological:      Mental Status: She is alert and oriented to person, place, and time.   Psychiatric:         Behavior: Behavior normal.         Thought Content: Thought content normal.         Judgment: Judgment normal.         Siri Ashley PA-C

## 2024-01-23 ENCOUNTER — OFFICE VISIT (OUTPATIENT)
Dept: FAMILY MEDICINE CLINIC | Facility: CLINIC | Age: 39
End: 2024-01-23
Payer: COMMERCIAL

## 2024-01-23 VITALS
TEMPERATURE: 99.7 F | HEIGHT: 62 IN | SYSTOLIC BLOOD PRESSURE: 110 MMHG | HEART RATE: 105 BPM | WEIGHT: 125.2 LBS | OXYGEN SATURATION: 98 % | DIASTOLIC BLOOD PRESSURE: 70 MMHG | BODY MASS INDEX: 23.04 KG/M2

## 2024-01-23 DIAGNOSIS — R50.9 CHILLS WITH FEVER: Primary | ICD-10-CM

## 2024-01-23 DIAGNOSIS — J02.9 SORE THROAT: ICD-10-CM

## 2024-01-23 LAB
S PYO AG THROAT QL: NEGATIVE
SARS-COV-2 AG UPPER RESP QL IA: NEGATIVE
SL AMB POCT RAPID FLU A: NEGATIVE
SL AMB POCT RAPID FLU B: NEGATIVE
VALID CONTROL: NORMAL

## 2024-01-23 PROCEDURE — 87811 SARS-COV-2 COVID19 W/OPTIC: CPT | Performed by: FAMILY MEDICINE

## 2024-01-23 PROCEDURE — 99213 OFFICE O/P EST LOW 20 MIN: CPT | Performed by: FAMILY MEDICINE

## 2024-01-23 PROCEDURE — 87880 STREP A ASSAY W/OPTIC: CPT | Performed by: FAMILY MEDICINE

## 2024-01-23 PROCEDURE — 87804 INFLUENZA ASSAY W/OPTIC: CPT | Performed by: FAMILY MEDICINE

## 2024-01-23 NOTE — PROGRESS NOTES
Family Medicine Follow-Up Office Visit  Radha Olivas 38 y.o. female   MRN: [id number] : 1985  ENCOUNTER: 2024       Assessment and Plan   1. Chills with fever  Assessment & Plan:  Patient presents today with symptoms of chills, fever, and sore throat.    Rapid testing for COVID, flu, strep is negative at today's visit.    Recommend that patient repeat COVID testing in 2 days.  Signs and symptoms are consistent with likely viral illness, would consider retesting for influenza in 2 days if symptoms persist without improvement.    Recommend continued conservative management including increased rest hydration and over-the-counter medications as needed.  May alternate Tylenol and ibuprofen as needed for pain and fever.    Follow-up in 1 to 2 weeks or sooner as needed for persistent or worsening symptoms.  Work note is provided    Orders:  -     POCT Rapid Covid Ag  -     POCT rapid flu A and B    2. Sore throat  -     POCT rapid ANTIGEN strepA             Chief Complaint     Chief Complaint   Patient presents with   • Sore Throat   • Headache   • Fever   • Chills       History of Present Illness   Radha Olivas is a 38 y.o.-year-old female with past medical history of left eustachian tube dysfunction, JUDE-3 with severe dysplasia and CHRIS who presents today for evaluation for symptoms of acute illness.    Patient notes that she started yesterday with symptoms of chills, fever, sore throat, and headache.  Notes that she has no appetite and she feels quite nauseous.  She notes body aches and right ear pain with occasional sinus pressure.  She notes fevers of approximately 101 Fahrenheit.     Review of Systems   Review of Systems   Constitutional:  Positive for appetite change, chills, fatigue and fever.   HENT:  Positive for ear pain, sinus pressure and sore throat. Negative for congestion.    Respiratory:  Negative for cough and shortness of breath.    Cardiovascular:  Negative for chest pain and palpitations.  "  Gastrointestinal:  Positive for nausea. Negative for abdominal pain, blood in stool, constipation, diarrhea and vomiting.   Genitourinary:  Negative for dysuria and hematuria.   Musculoskeletal:  Positive for myalgias. Negative for arthralgias.   Skin:  Negative for rash.   Neurological:  Positive for headaches. Negative for dizziness.       Active Problem List     Patient Active Problem List   Diagnosis   • JUDE III with severe dysplasia   • H/O LEEP   • Generalized anxiety disorder   • Rectus diastasis   • IUD contraception   • Pelvic pain   • Secondary oligomenorrhea   • Endometriosis   • HPV (human papilloma virus) infection   • Dysfunction of left eustachian tube   • Mild cervical dysplasia   • Irregular menses   • Chills with fever       Past Medical History, Past Surgical History, Family History, and Social History were reviewed and updated today as appropriate.    Objective   /70   Pulse 105   Temp 99.7 °F (37.6 °C)   Ht 5' 2\" (1.575 m)   Wt 56.8 kg (125 lb 3.2 oz)   SpO2 98%   BMI 22.90 kg/m²     Physical Exam  Vitals reviewed.   Constitutional:       General: She is not in acute distress.     Appearance: She is well-developed. She is ill-appearing. She is not toxic-appearing.   HENT:      Head: Normocephalic and atraumatic.      Right Ear: Tympanic membrane, ear canal and external ear normal.      Left Ear: Tympanic membrane, ear canal and external ear normal.      Mouth/Throat:      Mouth: Mucous membranes are moist.      Pharynx: Oropharynx is clear. Posterior oropharyngeal erythema present.      Tonsils: No tonsillar exudate.   Eyes:      General: No scleral icterus.     Conjunctiva/sclera: Conjunctivae normal.   Cardiovascular:      Rate and Rhythm: Normal rate and regular rhythm.      Heart sounds: Normal heart sounds.   Pulmonary:      Effort: Pulmonary effort is normal. No respiratory distress.      Breath sounds: Normal breath sounds. No wheezing.   Abdominal:      General: Bowel " "sounds are normal. There is no distension.      Palpations: Abdomen is soft.      Tenderness: There is no abdominal tenderness.   Musculoskeletal:      Cervical back: Normal range of motion and neck supple.      Right lower leg: No edema.      Left lower leg: No edema.   Lymphadenopathy:      Cervical: No cervical adenopathy.   Skin:     General: Skin is warm and dry.   Neurological:      General: No focal deficit present.      Mental Status: She is alert and oriented to person, place, and time.   Psychiatric:         Mood and Affect: Mood normal.         Behavior: Behavior normal.         Pertinent Laboratory/Diagnostic Studies:  Lab Results   Component Value Date    BUN 14 09/23/2021    CREATININE 0.72 09/23/2021    CALCIUM 8.6 09/23/2021    K 3.8 09/23/2021    CO2 28 09/23/2021     09/23/2021     Lab Results   Component Value Date    ALT 16 09/23/2021    AST 11 09/23/2021    ALKPHOS 70 09/23/2021       Lab Results   Component Value Date    WBC 11.77 (H) 09/23/2021    HGB 12.7 09/23/2021    HCT 37.0 09/23/2021    MCV 94 09/23/2021     09/23/2021       No results found for: \"TSH\"    No results found for: \"CHOL\"  No results found for: \"TRIG\"  No results found for: \"HDL\"  No results found for: \"LDLCALC\"  No results found for: \"HGBA1C\"    Results for orders placed or performed in visit on 01/23/24   POCT rapid ANTIGEN strepA   Result Value Ref Range     RAPID STREP A Negative Negative   POCT Rapid Covid Ag   Result Value Ref Range    POCT SARS-CoV-2 Ag Negative Negative    VALID CONTROL Valid    POCT rapid flu A and B   Result Value Ref Range    RAPID FLU A negative     RAPID FLU B negative        Orders Placed This Encounter   Procedures   • POCT rapid ANTIGEN strepA   • POCT Rapid Covid Ag   • POCT rapid flu A and B         Current Medications     Current Outpatient Medications   Medication Sig Dispense Refill   • levonorgestrel (MIRENA) 20 MCG/24HR IUD 1 each by Intrauterine route once        No " current facility-administered medications for this visit.       ALLERGIES:  Allergies   Allergen Reactions   • Pollen Extract    • Nickel Rash     Some earrings will get rash and very sore       Health Maintenance     Health Maintenance   Topic Date Due   • Hepatitis C Screening  Never done   • COVID-19 Vaccine (1) Never done   • Influenza Vaccine (1) 09/01/2023   • Depression Screening  03/27/2024   • Annual Physical  03/30/2024   • Cervical Cancer Screening  03/30/2026   • DTaP,Tdap,and Td Vaccines (3 - Td or Tdap) 04/24/2027   • Zoster Vaccine (1 of 2) 08/26/2037   • HIV Screening  Completed   • Pneumococcal Vaccine: Pediatrics (0 to 5 Years) and At-Risk Patients (6 to 64 Years)  Aged Out   • HIB Vaccine  Aged Out   • IPV Vaccine  Aged Out   • Hepatitis A Vaccine  Aged Out   • Meningococcal ACWY Vaccine  Aged Out   • HPV Vaccine  Aged Out     Immunization History   Administered Date(s) Administered   • Influenza Quadrivalent Preservative Free 3 years and older IM 10/03/2014   • Tdap 10/03/2014, 04/24/2017   • Tuberculin Skin Test-PPD Intradermal 07/19/2022         Betsy Bernard DO   [facility]  1/23/2024  3:49 PM    Parts of this note were dictated using Dragon dictation software and may have sounds-like errors due to variation in pronunciation.

## 2024-01-23 NOTE — ASSESSMENT & PLAN NOTE
Patient presents today with symptoms of chills, fever, and sore throat.    Rapid testing for COVID, flu, strep is negative at today's visit.    Recommend that patient repeat COVID testing in 2 days.  Signs and symptoms are consistent with likely viral illness, would consider retesting for influenza in 2 days if symptoms persist without improvement.    Recommend continued conservative management including increased rest hydration and over-the-counter medications as needed.  May alternate Tylenol and ibuprofen as needed for pain and fever.    Follow-up in 1 to 2 weeks or sooner as needed for persistent or worsening symptoms.  Work note is provided   36.7

## 2024-02-21 ENCOUNTER — OFFICE VISIT (OUTPATIENT)
Dept: URGENT CARE | Facility: CLINIC | Age: 39
End: 2024-02-21
Payer: COMMERCIAL

## 2024-02-21 VITALS
HEIGHT: 62 IN | SYSTOLIC BLOOD PRESSURE: 123 MMHG | DIASTOLIC BLOOD PRESSURE: 76 MMHG | TEMPERATURE: 98.5 F | WEIGHT: 125 LBS | BODY MASS INDEX: 23 KG/M2 | OXYGEN SATURATION: 98 % | RESPIRATION RATE: 16 BRPM | HEART RATE: 90 BPM

## 2024-02-21 DIAGNOSIS — B34.9 VIRAL ILLNESS: Primary | ICD-10-CM

## 2024-02-21 DIAGNOSIS — R50.9 FEVER, UNSPECIFIED FEVER CAUSE: ICD-10-CM

## 2024-02-21 PROCEDURE — 87636 SARSCOV2 & INF A&B AMP PRB: CPT | Performed by: PHYSICIAN ASSISTANT

## 2024-02-21 PROCEDURE — 99213 OFFICE O/P EST LOW 20 MIN: CPT | Performed by: PHYSICIAN ASSISTANT

## 2024-02-21 NOTE — LETTER
February 21, 2024     Patient: Radha Olivas  YOB: 1985  Date of Visit: 2/21/2024      To Whom it May Concern:    Radha Olivas is under my professional care. Radha was seen in my office on 2/21/2024. Radha may return to work when fever free without fever reducer for 24 hours.     If you have any questions or concerns, please don't hesitate to call.         Sincerely,          Siri Ashley PA-C        CC: No Recipients

## 2024-02-21 NOTE — PROGRESS NOTES
[facility]      NAME: Radha Olivas is a 38 y.o. female  : 1985    MRN: [id number]  DATE: 2024  TIME: 6:25 PM    Assessment and Plan   Viral illness [B34.9]  1. Viral illness        2. Fever, unspecified fever cause  Covid/Flu- Office Collect Normal          Patient Instructions   Covid/flu sendout. Reviewed quarantine guidelines. Will check my chart for results. Discussed paxlovid/tamiflu, patient would like to decline these. Infection appears viral.  Recommend symptomatic treatment.  Can take ibuprofen or tylenol as needed for pain or fever.  Over the counter cough and cold medications to help with symptoms.  Use salt water gargles for sore throat and throat lozenges.  Cough drops as needed.  Wash hands frequently to prevent the spread of infection.  If not improving over the next 7-10 days, follow up with PCP.  Symptoms may persist for 10-14 days.  To present to the ER if symptoms worsen.  Chief Complaint     Chief Complaint   Patient presents with   • Fever     Fever, chills, fatigue, cough, headache, body aches started Monday          History of Present Illness   Radha Olivas presents to the clinic c/o    Generalized Body Aches  Episode onset: . The problem occurs constantly. The problem is unchanged. Associated symptoms include congestion, ear pain, headaches, fatigue, a fever (100.7Tmax), coughing, vomiting (1x yesterday, none since) and muscle aches. Pertinent negatives include no ear discharge, eye discharge, eye itching, eye pain, eye redness, photophobia, sore throat, chest pain, shortness of breath, wheezing, abdominal pain or rash. Past treatments include one or more OTC medications and acetaminophen. The treatment provided mild relief.       Review of Systems   Review of Systems   Constitutional:  Positive for chills, fatigue and fever (100.7Tmax). Negative for diaphoresis.   HENT:  Positive for congestion and ear pain. Negative for ear discharge, facial swelling, sinus  pressure, sinus pain and sore throat.    Eyes:  Negative for photophobia, pain, discharge, redness, itching and visual disturbance.   Respiratory:  Positive for cough. Negative for apnea, chest tightness, shortness of breath and wheezing.    Cardiovascular:  Negative for chest pain and palpitations.   Gastrointestinal:  Positive for vomiting (1x yesterday, none since). Negative for abdominal pain.   Musculoskeletal:  Positive for myalgias.   Skin:  Negative for color change, rash and wound.   Neurological:  Positive for headaches. Negative for dizziness.   Hematological:  Negative for adenopathy.         Current Medications     Long-Term Medications   Medication Sig Dispense Refill   • levonorgestrel (MIRENA) 20 MCG/24HR IUD 1 each by Intrauterine route once          Current Allergies     Allergies as of 02/21/2024 - Reviewed 02/21/2024   Allergen Reaction Noted   • Pollen extract  09/11/2013   • Nickel Rash 04/16/2019            The following portions of the patient's history were reviewed and updated as appropriate: allergies, current medications, past family history, past medical history, past social history, past surgical history and problem list.  Past Medical History:   Diagnosis Date   • Abnormal Pap smear of cervix    • Adnexal fullness    • Amenorrhea    • Anxiety    • Arthralgia of toe    • Endometriosis 11/17/2020   • Exposure to parvovirus    • GERD (gastroesophageal reflux disease)     during pregnancy   • HPV (human papilloma virus) infection    • Nephrolithiasis    • Umbilical hernia    • Urinary tract infection      Past Surgical History:   Procedure Laterality Date   • CERVICAL BIOPSY  W/ LOOP ELECTRODE EXCISION     • COLPOSCOPY W/ BIOPSY / CURETTAGE     • OSTEOTOMY AND ULNAR SHORTENING Right    • [place] LAPS ABD PRTM&OMENTUM DX W/WO SPEC BR/WA SPX Bilateral 4/16/2019    Procedure: DIAGNOSTIC LAP; BILATERAL SALPINGECTOMY; OVARIAN CYSTECTOMY, ECSISION PELVIC CYST;  Surgeon: Michel Machado MD;  Location: [facility]  [facility] OR;  Service: Gynecology   • TOOTH EXTRACTION      [place] teeth   • WRIST SURGERY       Social History     Socioeconomic History   • Marital status: /Civil Union     Spouse name: Not on file   • Number of children: Not on file   • Years of education: Not on file   • Highest education level: Not on file   Occupational History   • Not on file   Tobacco Use   • Smoking status: Never   • Smokeless tobacco: Never   Vaping Use   • Vaping status: Never Used   Substance and Sexual Activity   • Alcohol use: Not Currently     Comment: occasional   • Drug use: No   • Sexual activity: Yes     Partners: Male   Other Topics Concern   • Not on file   Social History Narrative    Caffeine use    Contraceptive vaginal ring    One child    [employer] affiliation- Jainism     uses safety equipment- seatbelt     Social Determinants of Health     Financial Resource Strain: Not on file   Food Insecurity: Not on file   Transportation Needs: Not on file   Physical Activity: Not on file   Stress: Not on file   Social Connections: Not on file   Intimate Partner Violence: Not on file   Housing Stability: Not on file       Objective   /76   Pulse 90   Temp 98.5 °F (36.9 °C)   Resp 16   Ht 5' 2\" (1.575 m)   Wt 56.7 kg (125 lb)   SpO2 98%   BMI 22.86 kg/m²      Physical Exam     Physical Exam  Vitals and nursing note reviewed.   Constitutional:       General: She is not in acute distress.     Appearance: She is well-developed. She is not diaphoretic.   HENT:      Head: Normocephalic and atraumatic.      Right Ear: Tympanic membrane and external ear normal.      Left Ear: Tympanic membrane and external ear normal.      Nose: Nose normal.      Mouth/Throat:      Mouth: Mucous membranes are moist.      Pharynx: No oropharyngeal exudate or posterior oropharyngeal erythema.   Eyes:      General: No scleral icterus.        Right eye: No discharge.         Left eye: No discharge.      Conjunctiva/sclera: Conjunctivae normal. "   Cardiovascular:      Rate and Rhythm: Normal rate and regular rhythm.      Heart sounds: Normal heart sounds. No murmur heard.     No friction rub. No gallop.   Pulmonary:      Effort: Pulmonary effort is normal. No respiratory distress.      Breath sounds: Normal breath sounds. No decreased breath sounds, wheezing, rhonchi or rales.   Skin:     General: Skin is warm and dry.      Coloration: Skin is not pale.      Findings: No erythema or rash.   Neurological:      Mental Status: She is alert and oriented to person, place, and time.   Psychiatric:         Behavior: Behavior normal.         Thought Content: Thought content normal.         Judgment: Judgment normal.       Siri Ashley PA-C

## 2024-02-22 LAB
FLUAV RNA RESP QL NAA+PROBE: POSITIVE
FLUBV RNA RESP QL NAA+PROBE: NEGATIVE
SARS-COV-2 RNA RESP QL NAA+PROBE: NEGATIVE

## 2024-04-02 ENCOUNTER — ANNUAL EXAM (OUTPATIENT)
Dept: GYNECOLOGY | Facility: CLINIC | Age: 39
End: 2024-04-02
Payer: COMMERCIAL

## 2024-04-02 VITALS
HEIGHT: 61 IN | WEIGHT: 125.6 LBS | DIASTOLIC BLOOD PRESSURE: 70 MMHG | SYSTOLIC BLOOD PRESSURE: 120 MMHG | BODY MASS INDEX: 23.71 KG/M2

## 2024-04-02 DIAGNOSIS — N87.0 MILD CERVICAL DYSPLASIA: ICD-10-CM

## 2024-04-02 DIAGNOSIS — N80.9 ENDOMETRIOSIS: ICD-10-CM

## 2024-04-02 DIAGNOSIS — Z98.890 H/O LEEP: ICD-10-CM

## 2024-04-02 DIAGNOSIS — B97.7 HPV (HUMAN PAPILLOMA VIRUS) INFECTION: ICD-10-CM

## 2024-04-02 DIAGNOSIS — Z01.419 WOMEN'S ANNUAL ROUTINE GYNECOLOGICAL EXAMINATION: Primary | ICD-10-CM

## 2024-04-02 DIAGNOSIS — Z11.51 SCREENING FOR HUMAN PAPILLOMAVIRUS (HPV): ICD-10-CM

## 2024-04-02 DIAGNOSIS — Z97.5 IUD CONTRACEPTION: ICD-10-CM

## 2024-04-02 PROBLEM — D06.9 CIN III WITH SEVERE DYSPLASIA: Status: RESOLVED | Noted: 2018-01-30 | Resolved: 2024-04-02

## 2024-04-02 PROCEDURE — G0145 SCR C/V CYTO,THINLAYER,RESCR: HCPCS | Performed by: OBSTETRICS & GYNECOLOGY

## 2024-04-02 PROCEDURE — S0612 ANNUAL GYNECOLOGICAL EXAMINA: HCPCS | Performed by: OBSTETRICS & GYNECOLOGY

## 2024-04-02 PROCEDURE — G0476 HPV COMBO ASSAY CA SCREEN: HCPCS | Performed by: OBSTETRICS & GYNECOLOGY

## 2024-04-02 NOTE — PROGRESS NOTES
Assessment/Plan   Diagnoses and all orders for this visit:    Women's annual routine gynecological examination    H/O LEEP    Mild cervical dysplasia    IUD contraception    Endometriosis    Secondary oligomenorrhea    HPV (human papilloma virus) infection    1. yearly exam-Pap smear done with HPV testing, self breast awareness reviewed  2. Mirena IUD-status post removal with reinsertion on 10/31/2023.  Prior Mirena IUD was placed 6/26/2017.  Continues to note monthly menses which are quite light.  IUD strings seen at a length of 1 cm today.  She will call return with any issues.  3. history irregular bleeding-denies any complaints at this time.  She states that she never really has gotten oligomenorrhea/amenorrhea on the Mirena with this new IUD or the last 1.  4. history of HPV/low-grade JATIN-had Pap 3/30/2023 which was negative with positive HPV.  Colposcopy 4/6/2023 with negative limited ECC, 9/12 biopsy negative and low-grade changes 6:00.  Patient previously status post LEEP October 2017 for JUDE-2 to 3 with negative margins noted at that time.  Follow-up after this was unremarkable until the April 2023 colposcopy.  Pap with HPV done today, patient aware of need for colposcopy if either is abnormal.  5. history of pelvic pain/condyloma/labial cyst-no current concerns  6. status post laparoscopic bilateral salpingectomy for contraception  7.  History of endometriosis-noted at laparoscopy with bilateral salpingectomy from April 2019 laparoscopy.  Ovarian cyst was removed at that time with findings consistent with endometriosis.  8. history of ovarian cyst-removed as noted above.  Ultrasound 9/23/2021 with 16 mm involuting right ovarian cyst with minimal fluid.  Has clinically been negative since that time, to follow clinically  9. breast tenderness-does note this about 1 week prior to menses.  It does seem to be cyclical.  Likely is related to hormonal change in the luteal phase.  Practical recommendations were  reviewed including ibuprofen, acetaminophen, appropriately fitting bras, avoidance of caffeine.  Did discuss evening primrose oil, she will consider.  Follow-up 1 year for yearly exam or as needed.    Subjective   Patient ID: Radha Olivas is a 38 y.o. female.    Vitals:    24 1558   BP: 120/70     Patient was seen today for yearly exam.  Please see assessment plan for details.      The following portions of the patient's history were reviewed and updated as appropriate: allergies, current medications, past family history, past medical history, past social history, past surgical history, and problem list.  Past Medical History:   Diagnosis Date    Abnormal Pap smear of cervix     Adnexal fullness     Amenorrhea     Anxiety     Arthralgia of toe     Endometriosis 2020    Exposure to parvovirus     GERD (gastroesophageal reflux disease)     during pregnancy    HPV (human papilloma virus) infection     Nephrolithiasis     Umbilical hernia     Urinary tract infection      Past Surgical History:   Procedure Laterality Date    CERVICAL BIOPSY  W/ LOOP ELECTRODE EXCISION      COLPOSCOPY W/ BIOPSY / CURETTAGE      OSTEOTOMY AND ULNAR SHORTENING Right     AL LAPS ABD PRTM&OMENTUM DX W/WO SPEC BR/WA SPX Bilateral 2019    Procedure: DIAGNOSTIC LAP; BILATERAL SALPINGECTOMY; OVARIAN CYSTECTOMY, ECSISION PELVIC CYST;  Surgeon: Michel Machado MD;  Location: [facility];  Service: Gynecology    TOOTH EXTRACTION      [place] teeth    WRIST SURGERY       OB History    Para Term  AB Living   2 2 2 0 0 2   SAB IAB Ectopic Multiple Live Births   0 0 0 0 2      # Outcome Date GA Lbr [place]/2nd Weight Sex Delivery Anes PTL Lv   2 Term 17 38w2d / 00:06 3515 g (7 lb 12 oz) M Vag-Spont None N YUSUF   1 Term                Current Outpatient Medications:     levonorgestrel (MIRENA) 20 MCG/24HR IUD, 1 each by Intrauterine route once , Disp: , Rfl:   Allergies   Allergen Reactions    Pollen Extract     Nickel Rash      Some earrings will get rash and very sore     Social History     Socioeconomic History    Marital status: /Civil Union     Spouse name: None    Number of children: None    Years of education: None    Highest education level: None   Occupational History    None   Tobacco Use    Smoking status: Never    Smokeless tobacco: Never   Vaping Use    Vaping status: Never Used   Substance and Sexual Activity    Alcohol use: Not Currently     Comment: occasional    Drug use: No    Sexual activity: Yes     Partners: Male   Other Topics Concern    None   Social History Narrative    Caffeine use    Contraceptive vaginal ring    One child    [employer] affiliation- [employer]     uses safety equipment- seatbelt     Social Determinants of Health     Financial Resource Strain: Not on file   Food Insecurity: Not on file   Transportation Needs: Not on file   Physical Activity: Not on file   Stress: Not on file   Social Connections: Not on file   Intimate Partner Violence: Not on file   Housing Stability: Not on file     Family History   Problem Relation Age of Onset    Diabetes Maternal Grandmother     Heart disease Paternal Grandfather     Hypertension Paternal Grandfather     Hyperlipidemia Paternal Grandfather     Hypertension Father     Hyperlipidemia Father     Heart disease Maternal Grandfather     Diabetes Paternal Grandmother     Diabetes Family        Review of Systems   Constitutional:  Negative for chills, diaphoresis, fatigue and fever.   Respiratory:  Negative for apnea, cough, chest tightness, shortness of breath and wheezing.    Cardiovascular:  Negative for chest pain, palpitations and leg swelling.   Gastrointestinal:  Negative for abdominal distention, abdominal pain, anal bleeding, constipation, diarrhea, nausea, rectal pain and vomiting.   Genitourinary:  Negative for difficulty urinating, dyspareunia, dysuria, frequency, hematuria, menstrual problem, pelvic pain, urgency, vaginal bleeding, vaginal discharge  "and vaginal pain.   Musculoskeletal:  Negative for arthralgias, back pain and myalgias.   Skin:  Negative for color change and rash.   Neurological:  Negative for dizziness, syncope, light-headedness, numbness and headaches.   Hematological:  Negative for adenopathy. Does not bruise/bleed easily.   Psychiatric/Behavioral:  Negative for dysphoric mood and sleep disturbance. The patient is not nervous/anxious.        Objective   Physical Exam  OBGyn Exam     Objective      /70 (BP Location: Right arm, Patient Position: Sitting)   Ht 5' 1.25\" (1.556 m)   Wt 57 kg (125 lb 9.6 oz)   LMP 03/10/2024   BMI 23.54 kg/m²     General:   alert and oriented, in no acute distress   Neck: normal to inspection and palpation   Breast: normal appearance, no masses or tenderness   Heart:    Lungs:    Abdomen: soft, non-tender, without masses or organomegaly   Vulva: normal   Vagina: Without erythema or lesions or discharge.  Normal   Cervix: Without lesions or discharge or cervicitis.  No Cervical motion tenderness IUD strings seen at a length of 1 cm   Uterus: top normal size, anteverted, non-tender   Adnexa: no mass, fullness, tenderness   Rectum: negative    Psych:  Normal mood and affect   Skin:  Without obvious lesions   Eyes: symmetric, with normal movements and reactivity   Musculoskeletal:  Normal muscle tone and movements appreciated       "

## 2024-04-08 ENCOUNTER — HOSPITAL ENCOUNTER (EMERGENCY)
Facility: HOSPITAL | Age: 39
Discharge: HOME | End: 2024-04-08
Attending: EMERGENCY MEDICINE
Payer: COMMERCIAL

## 2024-04-08 ENCOUNTER — APPOINTMENT (EMERGENCY)
Dept: ULTRASOUND IMAGING | Facility: HOSPITAL | Age: 39
End: 2024-04-08
Payer: COMMERCIAL

## 2024-04-08 ENCOUNTER — NURSE TRIAGE (OUTPATIENT)
Age: 39
End: 2024-04-08

## 2024-04-08 VITALS
OXYGEN SATURATION: 98 % | TEMPERATURE: 97.8 F | BODY MASS INDEX: 23.8 KG/M2 | WEIGHT: 126.98 LBS | DIASTOLIC BLOOD PRESSURE: 58 MMHG | HEART RATE: 90 BPM | RESPIRATION RATE: 17 BRPM | SYSTOLIC BLOOD PRESSURE: 105 MMHG

## 2024-04-08 DIAGNOSIS — N83.209 OVARIAN CYST: Primary | ICD-10-CM

## 2024-04-08 LAB
ALBUMIN SERPL BCP-MCNC: 4.9 G/DL (ref 3.5–5)
ALP SERPL-CCNC: 48 U/L (ref 34–104)
ALT SERPL W P-5'-P-CCNC: 12 U/L (ref 7–52)
ANION GAP SERPL CALCULATED.3IONS-SCNC: 13 MMOL/L (ref 4–13)
AST SERPL W P-5'-P-CCNC: 42 U/L (ref 13–39)
BACTERIA UR QL AUTO: ABNORMAL /HPF
BASOPHILS # BLD AUTO: 0.07 THOUSANDS/ÂΜL (ref 0–0.1)
BASOPHILS NFR BLD AUTO: 0 % (ref 0–1)
BILIRUB SERPL-MCNC: 0.55 MG/DL (ref 0.2–1)
BILIRUB UR QL STRIP: NEGATIVE
BUN SERPL-MCNC: 16 MG/DL (ref 5–25)
CALCIUM SERPL-MCNC: 9.4 MG/DL (ref 8.4–10.2)
CHLORIDE SERPL-SCNC: 102 MMOL/L (ref 96–108)
CLARITY UR: CLEAR
CO2 SERPL-SCNC: 18 MMOL/L (ref 21–32)
COLOR UR: YELLOW
CREAT SERPL-MCNC: 0.65 MG/DL (ref 0.6–1.3)
EOSINOPHIL # BLD AUTO: 0.05 THOUSAND/ÂΜL (ref 0–0.61)
EOSINOPHIL NFR BLD AUTO: 0 % (ref 0–6)
ERYTHROCYTE [DISTWIDTH] IN BLOOD BY AUTOMATED COUNT: 13.4 % (ref 11.6–15.1)
EXT PREGNANCY TEST URINE: NEGATIVE
EXT. CONTROL: NORMAL
GFR SERPL CREATININE-BSD FRML MDRD: 114 ML/MIN/1.73SQ M
GLUCOSE SERPL-MCNC: 116 MG/DL (ref 65–140)
GLUCOSE UR STRIP-MCNC: NEGATIVE MG/DL
HCT VFR BLD AUTO: 41.2 % (ref 34.8–46.1)
HGB BLD-MCNC: 14.1 G/DL (ref 11.5–15.4)
HGB UR QL STRIP.AUTO: ABNORMAL
HYALINE CASTS #/AREA URNS LPF: ABNORMAL /LPF
IMM GRANULOCYTES # BLD AUTO: 0.05 THOUSAND/UL (ref 0–0.2)
IMM GRANULOCYTES NFR BLD AUTO: 0 % (ref 0–2)
KETONES UR STRIP-MCNC: ABNORMAL MG/DL
LEUKOCYTE ESTERASE UR QL STRIP: NEGATIVE
LIPASE SERPL-CCNC: 26 U/L (ref 11–82)
LYMPHOCYTES # BLD AUTO: 2.52 THOUSANDS/ÂΜL (ref 0.6–4.47)
LYMPHOCYTES NFR BLD AUTO: 15 % (ref 14–44)
MCH RBC QN AUTO: 31.9 PG (ref 26.8–34.3)
MCHC RBC AUTO-ENTMCNC: 34.2 G/DL (ref 31.4–37.4)
MCV RBC AUTO: 93 FL (ref 82–98)
MONOCYTES # BLD AUTO: 0.59 THOUSAND/ÂΜL (ref 0.17–1.22)
MONOCYTES NFR BLD AUTO: 3 % (ref 4–12)
NEUTROPHILS # BLD AUTO: 13.98 THOUSANDS/ÂΜL (ref 1.85–7.62)
NEUTS SEG NFR BLD AUTO: 82 % (ref 43–75)
NITRITE UR QL STRIP: NEGATIVE
NON-SQ EPI CELLS URNS QL MICRO: ABNORMAL /HPF
NRBC BLD AUTO-RTO: 0 /100 WBCS
PH UR STRIP.AUTO: 7 [PH] (ref 4.5–8)
PLATELET # BLD AUTO: 229 THOUSANDS/UL (ref 149–390)
PMV BLD AUTO: 11.5 FL (ref 8.9–12.7)
POTASSIUM SERPL-SCNC: 4.7 MMOL/L (ref 3.5–5.3)
PROT SERPL-MCNC: 7.9 G/DL (ref 6.4–8.4)
PROT UR STRIP-MCNC: NEGATIVE MG/DL
RBC # BLD AUTO: 4.42 MILLION/UL (ref 3.81–5.12)
RBC #/AREA URNS AUTO: ABNORMAL /HPF
SODIUM SERPL-SCNC: 133 MMOL/L (ref 135–147)
SP GR UR STRIP.AUTO: 1.02 (ref 1–1.03)
UROBILINOGEN UR QL STRIP.AUTO: 0.2 E.U./DL
WBC # BLD AUTO: 17.26 THOUSAND/UL (ref 4.31–10.16)
WBC #/AREA URNS AUTO: ABNORMAL /HPF

## 2024-04-08 PROCEDURE — 80053 COMPREHEN METABOLIC PANEL: CPT | Performed by: EMERGENCY MEDICINE

## 2024-04-08 PROCEDURE — 36415 COLL VENOUS BLD VENIPUNCTURE: CPT | Performed by: EMERGENCY MEDICINE

## 2024-04-08 PROCEDURE — 76856 US EXAM PELVIC COMPLETE: CPT

## 2024-04-08 PROCEDURE — 81025 URINE PREGNANCY TEST: CPT | Performed by: EMERGENCY MEDICINE

## 2024-04-08 PROCEDURE — 81001 URINALYSIS AUTO W/SCOPE: CPT

## 2024-04-08 PROCEDURE — 85025 COMPLETE CBC W/AUTO DIFF WBC: CPT | Performed by: EMERGENCY MEDICINE

## 2024-04-08 PROCEDURE — 83690 ASSAY OF LIPASE: CPT | Performed by: EMERGENCY MEDICINE

## 2024-04-08 RX ORDER — KETOROLAC TROMETHAMINE 10 MG/1
10 TABLET, FILM COATED ORAL EVERY 6 HOURS PRN
Qty: 14 TABLET | Refills: 0 | Status: SHIPPED | OUTPATIENT
Start: 2024-04-08

## 2024-04-08 RX ORDER — ONDANSETRON 2 MG/ML
4 INJECTION INTRAMUSCULAR; INTRAVENOUS ONCE
Status: COMPLETED | OUTPATIENT
Start: 2024-04-08 | End: 2024-04-08

## 2024-04-08 RX ORDER — ONDANSETRON 4 MG/1
4 TABLET, FILM COATED ORAL EVERY 8 HOURS PRN
Qty: 7 TABLET | Refills: 0 | Status: SHIPPED | OUTPATIENT
Start: 2024-04-08

## 2024-04-08 RX ORDER — HYDROMORPHONE HCL/PF 1 MG/ML
1 SYRINGE (ML) INJECTION ONCE
Status: COMPLETED | OUTPATIENT
Start: 2024-04-08 | End: 2024-04-08

## 2024-04-08 RX ADMIN — SODIUM CHLORIDE 1000 ML: 0.9 INJECTION, SOLUTION INTRAVENOUS at 18:56

## 2024-04-08 RX ADMIN — ONDANSETRON 4 MG: 2 INJECTION INTRAMUSCULAR; INTRAVENOUS at 19:00

## 2024-04-08 RX ADMIN — HYDROMORPHONE HYDROCHLORIDE 1 MG: 1 INJECTION, SOLUTION INTRAMUSCULAR; INTRAVENOUS; SUBCUTANEOUS at 18:53

## 2024-04-08 RX ADMIN — HYDROMORPHONE HYDROCHLORIDE 1 MG: 1 INJECTION, SOLUTION INTRAMUSCULAR; INTRAVENOUS; SUBCUTANEOUS at 21:03

## 2024-04-08 NOTE — TELEPHONE ENCOUNTER
"Pt called in stating she thinks she has a ruptured ovarian cyst. States she has felt like there has been a cyst the last two weeks on her right ovary. Today around 2pm pain became severe in her RLQ and wraps around to her back. States she took oxycodone about 2 hours ago with no relief. Pain is currently a 9/10. Having difficulty walking. Denies any fever, nausea, vomiting, diarrhea. Recommended pt be evaluated in the ED and she is in agreement.     Reason for Disposition   SEVERE abdominal pain (e.g., excruciating)    Answer Assessment - Initial Assessment Questions  1. LOCATION: \"Where does it hurt?\"       RLQ pain that radiates to her back  2. RADIATION: \"Does the pain shoot anywhere else?\" (e.g., chest, back)      back  3. ONSET: \"When did the pain begin?\" (e.g., minutes, hours or days ago)       About 2 pm  4. SUDDEN: \"Gradual or sudden onset?\"      sudden  5. PATTERN \"Does the pain come and go, or is it constant?\"     - If constant: \"Is it getting better, staying the same, or worsening?\"       (Note: Constant means the pain never goes away completely; most serious pain is constant and it progresses)      - If intermittent: \"How long does it last?\" \"Do you have pain now?\"      (Note: Intermittent means the pain goes away completely between bouts)      constant  6. SEVERITY: \"How bad is the pain?\"  (e.g., Scale 1-10; mild, moderate, or severe)    - MILD (1-3): doesn't interfere with normal activities, abdomen soft and not tender to touch     - MODERATE (4-7): interferes with normal activities or awakens from sleep, tender to touch     - SEVERE (8-10): excruciating pain, doubled over, unable to do any normal activities       9/10  7. RECURRENT SYMPTOM: \"Have you ever had this type of stomach pain before?\" If Yes, ask: \"When was the last time?\" and \"What happened that time?\"       Had ruptured ovarian cyst a few years ago. Oxycodone helped with the pain at that time. Not helping at this time.   8. CAUSE: \"What do " "you think is causing the stomach pain?\"      Ruptured ovarian cyst  9. RELIEVING/AGGRAVATING FACTORS: \"What makes it better or worse?\" (e.g., movement, antacids, bowel movement)      denies  10. OTHER SYMPTOMS: \"Has there been any vomiting, diarrhea, constipation, or urine problems?\"        denies    Protocols used: Abdominal Pain - Female-ADULT-[place]    "

## 2024-04-08 NOTE — Clinical Note
Radha Olivas was seen and treated in our emergency department on 4/8/2024.    No restrictions            Diagnosis:     Radha  may return to work on return date.    She may return on this date: 04/10/2024         If you have any questions or concerns, please don't hesitate to call.      Silvio Cabrera MD    ______________________________           _______________          _______________  Hospital Representative                              Date                                Time

## 2024-04-09 ENCOUNTER — TELEPHONE (OUTPATIENT)
Age: 39
End: 2024-04-09

## 2024-04-09 NOTE — ED PROVIDER NOTES
History  Chief Complaint   Patient presents with    Abdominal Pain     Worsening RLQ pain. Hx ovarian cyst. Taking oxy without relief.        History provided by:  Patient  Abdominal Pain  Pain location:  RLQ  Pain quality: sharp and shooting    Pain radiates to:  R flank  Pain severity:  Severe  Onset quality:  Sudden  Duration: several hours.  Timing:  Constant  Progression:  Worsening  Chronicity:  Recurrent  Relieved by:  Nothing  Worsened by:  Nothing  Associated symptoms: nausea    Associated symptoms: no chest pain, no constipation, no diarrhea, no dysuria, no fatigue, no fever, no hematuria, no shortness of breath, no vaginal bleeding, no vaginal discharge and no vomiting        Prior to Admission Medications   Prescriptions Last Dose Informant Patient Reported? Taking?   levonorgestrel (MIRENA) 20 MCG/24HR IUD  Self Yes No   Si each by Intrauterine route once       Facility-Administered Medications: None       Past Medical History:   Diagnosis Date    Abnormal Pap smear of cervix     Adnexal fullness     Amenorrhea     Anxiety     Arthralgia of toe     Endometriosis 2020    Exposure to parvovirus     GERD (gastroesophageal reflux disease)     during pregnancy    HPV (human papilloma virus) infection     Nephrolithiasis     Umbilical hernia     Urinary tract infection        Past Surgical History:   Procedure Laterality Date    CERVICAL BIOPSY  W/ LOOP ELECTRODE EXCISION      COLPOSCOPY W/ BIOPSY / CURETTAGE      OSTEOTOMY AND ULNAR SHORTENING Right     IN LAPS ABD PRTM&OMENTUM DX W/WO SPEC BR/WA SPX Bilateral 2019    Procedure: DIAGNOSTIC LAP; BILATERAL SALPINGECTOMY; OVARIAN CYSTECTOMY, ECSISION PELVIC CYST;  Surgeon: Michel Machado MD;  Location: [facility] OR;  Service: Gynecology    TOOTH EXTRACTION      [place] teeth    WRIST SURGERY         Family History   Problem Relation Age of Onset    Diabetes Maternal Grandmother     Heart disease Paternal Grandfather     Hypertension Paternal  Grandfather     Hyperlipidemia Paternal Grandfather     Hypertension Father     Hyperlipidemia Father     Heart disease Maternal Grandfather     Diabetes Paternal Grandmother     Diabetes Family      I have reviewed and agree with the history as documented.    E-Cigarette/Vaping    E-Cigarette Use Never User      E-Cigarette/Vaping Substances    Nicotine No     THC No     CBD No     Flavoring No     Other No     Unknown No      Social History     Tobacco Use    Smoking status: Never    Smokeless tobacco: Never   Vaping Use    Vaping status: Never Used   Substance Use Topics    Alcohol use: Not Currently     Comment: occasional    Drug use: No       Review of Systems   Constitutional:  Negative for appetite change, diaphoresis, fatigue and fever.   HENT:  Negative for congestion, dental problem and rhinorrhea.    Eyes:  Negative for pain.   Respiratory:  Negative for chest tightness and shortness of breath.    Cardiovascular:  Negative for chest pain and leg swelling.   Gastrointestinal:  Positive for abdominal pain and nausea. Negative for blood in stool, constipation, diarrhea and vomiting.   Endocrine: Negative for polydipsia, polyphagia and polyuria.   Genitourinary:  Negative for difficulty urinating, dyspareunia, dysuria, enuresis, flank pain, frequency, hematuria, pelvic pain, vaginal bleeding and vaginal discharge.   Musculoskeletal:  Negative for arthralgias, back pain and myalgias.   Skin:  Negative for color change and rash.   Neurological:  Negative for dizziness, weakness, light-headedness and headaches.   Psychiatric/Behavioral:  Negative for hallucinations and suicidal ideas.        Physical Exam  Physical Exam  Vitals and nursing note reviewed.   Constitutional:       Appearance: She is well-developed.   HENT:      Mouth/Throat:      Pharynx: No oropharyngeal exudate.   Eyes:      Conjunctiva/sclera: Conjunctivae normal.   Neck:      Comments: No meningeal signs  Cardiovascular:      Rate and Rhythm:  Normal rate and regular rhythm.      Heart sounds: Normal heart sounds.   Pulmonary:      Effort: Pulmonary effort is normal. No respiratory distress.      Breath sounds: Normal breath sounds. No wheezing or rales.   Chest:      Chest wall: No tenderness.   Abdominal:      General: Bowel sounds are normal. There is no distension.      Palpations: Abdomen is soft. There is no mass.      Tenderness: There is abdominal tenderness in the right lower quadrant. There is no right CVA tenderness, left CVA tenderness, guarding or rebound.      Hernia: No hernia is present.      Comments: No cvat   Musculoskeletal:         General: Normal range of motion.      Cervical back: Normal range of motion and neck supple.   Lymphadenopathy:      Cervical: No cervical adenopathy.   Skin:     Findings: No erythema or rash.      Comments: No edema   Neurological:      Mental Status: She is alert and oriented to person, place, and time.      Cranial Nerves: No cranial nerve deficit.   Psychiatric:         Behavior: Behavior normal.         Vital Signs  ED Triage Vitals   Temperature Pulse Respirations Blood Pressure SpO2   04/08/24 1750 04/08/24 1749 04/08/24 1749 04/08/24 1807 04/08/24 1750   97.8 °F (36.6 °C) (!) 106 20 109/56 98 %      Temp src Heart Rate Source Patient Position - Orthostatic VS BP Location FiO2 (%)   -- 04/08/24 1850 04/08/24 1850 04/08/24 1850 --    Monitor Lying Right arm       Pain Score       04/08/24 1850       9           Vitals:    04/08/24 1749 04/08/24 1807 04/08/24 1850 04/08/24 2106   BP:  109/56 116/62 105/58   Pulse: (!) 106  99 90   Patient Position - Orthostatic VS:   Lying Lying         Visual Acuity      ED Medications  Medications   sodium chloride 0.9 % bolus 1,000 mL (0 mL Intravenous Stopped 4/8/24 2101)   ondansetron (ZOFRAN) injection 4 mg (4 mg Intravenous Given 4/8/24 1900)   HYDROmorphone (DILAUDID) injection 1 mg (1 mg Intravenous Given 4/8/24 1853)   HYDROmorphone (DILAUDID) injection 1 mg  (1 mg Intravenous Given 4/8/24 2103)       Diagnostic Studies  Results Reviewed       Procedure Component Value Units Date/Time    POCT pregnancy, urine [[id number]]  (Normal) Resulted: 04/08/24 2111    Lab Status: Final result Updated: 04/08/24 2111     EXT Preg Test, Ur Negative     Control Valid    Comprehensive metabolic panel [[id number]]  (Abnormal) Collected: 04/08/24 1853    Lab Status: Final result Specimen: Blood from Arm, Right Updated: 04/08/24 1933     Sodium 133 mmol/L      Potassium 4.7 mmol/L      Chloride 102 mmol/L      CO2 18 mmol/L      ANION GAP 13 mmol/L      BUN 16 mg/dL      Creatinine 0.65 mg/dL      Glucose 116 mg/dL      Calcium 9.4 mg/dL      AST 42 U/L      ALT 12 U/L      Alkaline Phosphatase 48 U/L      Total Protein 7.9 g/dL      Albumin 4.9 g/dL      Total Bilirubin 0.55 mg/dL      eGFR 114 ml/min/1.73sq m     Narrative:      National Kidney Disease Foundation guidelines for Chronic Kidney Disease (CKD):     Stage 1 with normal or high GFR (GFR > 90 mL/min/1.73 square meters)    Stage 2 Mild CKD (GFR = 60-89 mL/min/1.73 square meters)    Stage 3A Moderate CKD (GFR = 45-59 mL/min/1.73 square meters)    Stage 3B Moderate CKD (GFR = 30-44 mL/min/1.73 square meters)    Stage 4 Severe CKD (GFR = 15-29 mL/min/1.73 square meters)    Stage 5 End Stage CKD (GFR <15 mL/min/1.73 square meters)  Note: GFR calculation is accurate only with a steady state creatinine    Lipase [[id number]]  (Normal) Collected: 04/08/24 1853    Lab Status: Final result Specimen: Blood from Arm, Right Updated: 04/08/24 1933     Lipase 26 u/L     CBC and differential [[id number]]  (Abnormal) Collected: 04/08/24 1853    Lab Status: Final result Specimen: Blood from Arm, Right Updated: 04/08/24 1910     WBC 17.26 Thousand/uL      RBC 4.42 Million/uL      Hemoglobin 14.1 g/dL      Hematocrit 41.2 %      MCV 93 fL      MCH 31.9 pg      MCHC 34.2 g/dL      RDW 13.4 %      MPV 11.5 fL      Platelets 229 Thousands/uL      nRBC 0  /100 WBCs      Neutrophils Relative 82 %      Immature Grans % 0 %      Lymphocytes Relative 15 %      Monocytes Relative 3 %      Eosinophils Relative 0 %      Basophils Relative 0 %      Neutrophils Absolute 13.98 Thousands/µL      Absolute Immature Grans 0.05 Thousand/uL      Absolute Lymphocytes 2.52 Thousands/µL      Absolute Monocytes 0.59 Thousand/µL      Eosinophils Absolute 0.05 Thousand/µL      Basophils Absolute 0.07 Thousands/µL                    US pelvis complete   Final Result by Jose L Samuels MD (04/08 2010)      No evidence of right ovarian torsion. Left ovary not seen.      Two right ovarian cysts measuring 3.7 and 3.1 cm with an echogenic nodule along the periphery of the more inferior cyst, which may represent a mural nodule versus adjacent ovarian parenchyma. Follow-up ultrasound in 8-12 weeks is recommended to ensure    resolution.      IUD in satisfactory position.      The study was marked in EPIC for immediate notification.      Workstation performed: [id number]                    Procedures  Procedures         ED Course  ED Course as of 04/08/24 2124 Mon Apr 08, 2024 1915 WBC(!): 17.26   2121 UTI negative for uti, labs unremarkable, pt updated on ovarian cyst w/o torsion, tx plan and need for follow up discussed. Will dc to home.                                SBIRT 20yo+      Flowsheet Row Most Recent Value   Initial Alcohol Screen: US AUDIT-C     1. How often do you have a drink containing alcohol? 0 Filed at: 04/08/2024 1809   2. How many drinks containing alcohol do you have on a typical day you are drinking?  0 Filed at: 04/08/2024 1809   3a. Male UNDER 65: How often do you have five or more drinks on one occasion? 0 Filed at: 04/08/2024 1809   3b. FEMALE Any Age, or MALE 65+: How often do you have 4 or more drinks on one occassion? 0 Filed at: 04/08/2024 1809   Audit-C Score 0 Filed at: 04/08/2024 1809   BECK: How many times in the past year have you...    Used an illegal drug  or used a prescription medication for non-medical reasons? Never Filed at: 04/08/2024 1809                      Medical Decision Making  Acute onset right lower quadrant abdominal pain likely secondary to ovarian cyst possibly secondary to torsion, kidney stone, UTI, pregnancy complication will do urine dip, urine pregnancy, abdominal labs, pelvic ultrasound rule out ovarian cyst/torsion.    Amount and/or Complexity of Data Reviewed  Labs: ordered. Decision-making details documented in ED Course.  Radiology: ordered.    Risk  Prescription drug management.             Disposition  Final diagnoses:   Ovarian cyst     Time reflects when diagnosis was documented in both MDM as applicable and the Disposition within this note       Time User Action Codes Description Comment    4/8/2024  9:23 PM Silvio Cabrera Add [N83.209] Ovarian cyst           ED Disposition       ED Disposition   Discharge    Condition   Stable    Date/Time   Mon Apr 8, 2024 2124    Comment   Radha Olivas discharge to home/self care.                   Follow-up Information       Follow up With Specialties Details Why Contact Info    Michel Machado MD Obstetrics and Gynecology, Obstetrics, Gynecology Schedule an appointment as soon as possible for a visit in 2 days  [address] [id number] [phone]              Patient's Medications   Discharge Prescriptions    KETOROLAC (TORADOL) 10 MG TABLET    Take 1 tablet (10 mg total) by mouth every 6 (six) hours as needed for moderate pain       Start Date: 4/8/2024  End Date: --       Order Dose: 10 mg       Quantity: 14 tablet    Refills: 0    ONDANSETRON (ZOFRAN) 4 MG TABLET    Take 1 tablet (4 mg total) by mouth every 8 (eight) hours as needed for vomiting or nausea for up to 7 doses       Start Date: 4/8/2024  End Date: --       Order Dose: 4 mg       Quantity: 7 tablet    Refills: 0       No discharge procedures on file.    PDMP Review       None            ED  Provider  Electronically Signed by             Silvio Cabrera MD  04/08/24 8127

## 2024-04-09 NOTE — TELEPHONE ENCOUNTER
Patient called stating that she was in the [facility] ER 4/8 with lower abdominal pain. They found 2 cysts on her one ovary. She says that she is feeling better today. She wanted to see if she should make an appt or should she see if the pain subsides?

## 2024-04-10 LAB
LAB AP GYN PRIMARY INTERPRETATION: NORMAL
LAB AP LMP: NORMAL
Lab: NORMAL

## 2024-06-12 NOTE — PROGRESS NOTES
AMB US Pelvic Non OB    Date/Time: 6/13/2024 2:15 PM    Performed by: Jeny Liu  Authorized by: Michel Machado MD  Universal Protocol:  Consent: Verbal consent obtained.  Consent given by: patient  Timeout called at: 6/13/2024 2:10 PM.  Patient understanding: patient states understanding of the procedure being performed  Patient identity confirmed: verbally with patient    Procedure details:     SIS Procedure: No    Technique:  Transvaginal US, Non-OB    Position: lithotomy exam    Uterine findings:     Length (cm): 6.91    Height (cm):  3.96    Width (cm):  5.52    Endometrial stripe: identified      Endometrium thickness (mm):  9.4  Left ovary findings:     Left ovary:  Visualized    Length (cm): 2.48    Height (cm): 1.05    Width (cm): 1.58  Right ovary findings:     Right ovary:  Visualized    Length (cm): 3.77    Height (cm): 1.81    Width (cm): 3.31  Other findings:     Free pelvic fluid: not identified      Free peritoneal fluid: not identified    Post-Procedure Details:     Impression:  Anteverted uterus demonstrates an IUD in good position within the endometrium. The uterus and left ovary appear within normal limits. Visualization of the left ovary is partially obscured due to bowel gas. The right ovary again demonstrates a 2.5cm cyst (previously 3.1cm) with a mural nodule within, 2.0cm. A second cyst is not seen on today's exam. No free fluid.     Tolerance:  Tolerated well, no immediate complications    Complications: no complications    Additional Procedure Comments:      [employer] F8 E8C-RS transvaginal transducer Serial # [id number] was used to perform the examination today and subsequently followed with high level disinfection utilizing Trophon EPR procedure.     Ultrasound performed at:     [facility]  [address] [place] [address]  Phone:  [phone]  Fax:  [phone]

## 2024-06-13 ENCOUNTER — ULTRASOUND (OUTPATIENT)
Dept: GYNECOLOGY | Facility: CLINIC | Age: 39
End: 2024-06-13
Payer: COMMERCIAL

## 2024-06-13 ENCOUNTER — OFFICE VISIT (OUTPATIENT)
Dept: GYNECOLOGY | Facility: CLINIC | Age: 39
End: 2024-06-13
Payer: COMMERCIAL

## 2024-06-13 VITALS — SYSTOLIC BLOOD PRESSURE: 114 MMHG | BODY MASS INDEX: 22.86 KG/M2 | WEIGHT: 122 LBS | DIASTOLIC BLOOD PRESSURE: 74 MMHG

## 2024-06-13 DIAGNOSIS — Z97.5 IUD CONTRACEPTION: Primary | ICD-10-CM

## 2024-06-13 DIAGNOSIS — R10.2 PELVIC PAIN: ICD-10-CM

## 2024-06-13 DIAGNOSIS — B97.7 HPV (HUMAN PAPILLOMA VIRUS) INFECTION: ICD-10-CM

## 2024-06-13 DIAGNOSIS — N83.201 RIGHT OVARIAN CYST: Primary | ICD-10-CM

## 2024-06-13 DIAGNOSIS — N92.6 IRREGULAR MENSES: ICD-10-CM

## 2024-06-13 DIAGNOSIS — N83.201 RIGHT OVARIAN CYST: ICD-10-CM

## 2024-06-13 PROBLEM — N87.0 MILD CERVICAL DYSPLASIA: Status: RESOLVED | Noted: 2023-04-27 | Resolved: 2024-06-13

## 2024-06-13 PROCEDURE — 99213 OFFICE O/P EST LOW 20 MIN: CPT | Performed by: OBSTETRICS & GYNECOLOGY

## 2024-06-13 PROCEDURE — 76830 TRANSVAGINAL US NON-OB: CPT | Performed by: OBSTETRICS & GYNECOLOGY

## 2024-06-13 NOTE — PROGRESS NOTES
Assessment & Plan   Diagnoses and all orders for this visit:    IUD contraception    Pelvic pain    Irregular menses    Right ovarian cyst    HPV (human papilloma virus) infection    1. right ovarian cyst-was seen on 4/8/2024 with severe pain.  On ultrasound, 2 right ovarian cyst noted measuring 3.7 and 3.1 cm, with echogenic nodule noted along the periphery of the 3.1 cm cyst.  Follow-up ultrasound today was with apparent resolution of the 3.7 cm cyst and decrease in size of the 3.1 cm cyst with now measuring 2.5 cm with still small echogenic nodule within it.  Visualizing it, it appears to be a collection of hemorrhage.  Patient was counseled about the findings.  She denies any current symptoms.  She will call or return with any issues.  Otherwise, follow-up 2 to 3 months time for ultrasound and visit with me.  2.  Mirena IUD-status post removal with reinsertion on 10/31/2023, with prior Mirena placed 6/26/2017.  IUD is in good position on ultrasound today.  3.  History of irregular bleeding-decreased bleeding noted on Mirena IUD, she will continue with this.  4. history of HPV/low-grade JATIN-Pap negative with positive HPV noted 3/30/2023 with colposcopy 4/6/2023 with negative limited ECC, low-grade changes at 6 and negative biopsies at 9 and 12.  Repeat Pap with HPV was negative from 4/2/2024 visit.  Patient was previously relieved to hear this.  No intervention is recommended at this time.  5. previous history of condyloma/labial cyst-no current concerns  6. laparoscopic bilateral salpingectomy done for contraception  7.  History of endometriosis-noted at ovarian cyst which was removed with bilateral salpingectomy from April 2019 laparoscopy.  She will call with any symptoms.  8.  Prior history of breast tenderness-no current concerns  Follow-up 2 to 3 months for ultrasound and visit with me or as needed.    Subjective   Patient ID: Radha Olivas is a 38 y.o. female.    Vitals:    06/13/24 1424   BP: 114/74      HPI    The following portions of the patient's history were reviewed and updated as appropriate: allergies, current medications, past family history, past medical history, past social history, past surgical history, and problem list.  Past Medical History:   Diagnosis Date    Abnormal Pap smear of cervix     Adnexal fullness     Amenorrhea     Anxiety     Arthralgia of toe     Endometriosis 2020    Exposure to parvovirus     GERD (gastroesophageal reflux disease)     during pregnancy    HPV (human papilloma virus) infection     Nephrolithiasis     Umbilical hernia     Urinary tract infection      Past Surgical History:   Procedure Laterality Date    CERVICAL BIOPSY  W/ LOOP ELECTRODE EXCISION      COLPOSCOPY W/ BIOPSY / CURETTAGE      OSTEOTOMY AND ULNAR SHORTENING Right     [place] LAPS ABD PRTM&OMENTUM DX W/WO SPEC BR/WA SPX Bilateral 2019    Procedure: DIAGNOSTIC LAP; BILATERAL SALPINGECTOMY; OVARIAN CYSTECTOMY, ECSISION PELVIC CYST;  Surgeon: Michel Machado MD;  Location: [facility];  Service: Gynecology    TOOTH EXTRACTION      [place] teeth    WRIST SURGERY       OB History    Para Term  AB Living   2 2 2 0 0 2   SAB IAB Ectopic Multiple Live Births   0 0 0 0 2      # Outcome Date GA Lbr Kennedy/2nd Weight Sex Type Anes PTL Lv   2 Term 17 38w2d / 00:06 3515 g (7 lb 12 oz) M Vag-Spont None N YUSUF   1 Term                Current Outpatient Medications:     ketorolac (TORADOL) 10 mg tablet, Take 1 tablet (10 mg total) by mouth every 6 (six) hours as needed for moderate pain, Disp: 14 tablet, Rfl: 0    levonorgestrel (MIRENA) 20 MCG/24HR IUD, 1 each by Intrauterine route once , Disp: , Rfl:     ondansetron (ZOFRAN) 4 mg tablet, Take 1 tablet (4 mg total) by mouth every 8 (eight) hours as needed for vomiting or nausea for up to 7 doses, Disp: 7 tablet, Rfl: 0  Allergies   Allergen Reactions    Pollen Extract     Nickel Rash     Some earrings will get rash and very sore     Social History      Socioeconomic History    Marital status: /Civil Union     Spouse name: None    Number of children: None    Years of education: None    Highest education level: None   Occupational History    None   Tobacco Use    Smoking status: Never    Smokeless tobacco: Never   Vaping Use    Vaping status: Never Used   Substance and Sexual Activity    Alcohol use: Not Currently     Comment: occasional    Drug use: No    Sexual activity: Yes     Partners: Male   Other Topics Concern    None   Social History Narrative    Caffeine use    Contraceptive vaginal ring    One child    [employer] affiliation- [employer]     uses safety equipment- seatbelt     Social Determinants of Health     Financial Resource Strain: Not on file   Food Insecurity: Not on file   Transportation Needs: Not on file   Physical Activity: Not on file   Stress: Not on file   Social Connections: Not on file   Intimate Partner Violence: Not on file   Housing Stability: Not on file     Family History   Problem Relation Age of Onset    Diabetes Maternal Grandmother     Heart disease Paternal Grandfather     Hypertension Paternal Grandfather     Hyperlipidemia Paternal Grandfather     Hypertension Father     Hyperlipidemia Father     Heart disease Maternal Grandfather     Diabetes Paternal Grandmother     Diabetes Family        Review of Systems    Objective   Physical Exam  OBGyn Exam     Objective      /74 (BP Location: Left arm, Patient Position: Sitting)   Wt 55.3 kg (122 lb)   BMI 22.86 kg/m²     General:   alert and oriented, in no acute distress   Neck:    Breast:    Heart:    Lungs:    Abdomen: Nontender   Vulva:    Vagina:    Cervix:    Uterus:    Adnexa:    Rectum:     Psych:  Normal mood and affect   Skin:  Without obvious lesions   Eyes: symmetric, with normal movements and reactivity   Musculoskeletal:  Normal muscle tone and movements appreciated

## 2024-07-23 ENCOUNTER — OFFICE VISIT (OUTPATIENT)
Dept: URGENT CARE | Age: 39
End: 2024-07-23
Payer: COMMERCIAL

## 2024-07-23 ENCOUNTER — APPOINTMENT (OUTPATIENT)
Dept: RADIOLOGY | Age: 39
End: 2024-07-23
Payer: COMMERCIAL

## 2024-07-23 VITALS
HEART RATE: 87 BPM | DIASTOLIC BLOOD PRESSURE: 75 MMHG | OXYGEN SATURATION: 99 % | TEMPERATURE: 97.9 F | SYSTOLIC BLOOD PRESSURE: 138 MMHG | RESPIRATION RATE: 18 BRPM | WEIGHT: 123.4 LBS | HEIGHT: 62 IN | BODY MASS INDEX: 22.71 KG/M2

## 2024-07-23 DIAGNOSIS — M25.532 WRIST PAIN, ACUTE, LEFT: ICD-10-CM

## 2024-07-23 DIAGNOSIS — S69.92XA LEFT WRIST INJURY, INITIAL ENCOUNTER: Primary | ICD-10-CM

## 2024-07-23 PROCEDURE — 99213 OFFICE O/P EST LOW 20 MIN: CPT | Performed by: EMERGENCY MEDICINE

## 2024-07-23 PROCEDURE — 73110 X-RAY EXAM OF WRIST: CPT

## 2024-07-23 PROCEDURE — 29125 APPL SHORT ARM SPLINT STATIC: CPT

## 2024-07-23 NOTE — PROGRESS NOTES
[facility]        NAME: Radha Olivas is a 38 y.o. female  : 1985    MRN: [id number]  DATE: 2024  TIME: 4:54 PM      Assessment and Plan     Left wrist injury, initial encounter [S69.92XA]  1. Left wrist injury, initial encounter  XR wrist 3+ vw left    Ambulatory Referral to Orthopedic Surgery          Wet read left wrist x-ray shows no acute bony abnormalities.  Will monitor for final read.  Patient Instructions     The final xray result will appear in your mychart; use the brace until you get the xray result, if the final xray shows a fracture, keep the brace on until you follow-up with orthopedics, if the xray shows no fracture, you can use the brace as needed; take off every 1-2 hours and can take it off to sleep and shower if there is no fracture.   The final result of the xray will appear in your my chart.  Ice as needed.  Rest.  Elevate.  Acetaminophen or ibuprofen for pain.  Follow-up with orthopedics.   PCP follow-up in 3-5 days  Proceed to the ER if symptoms worsen.   Chief Complaint     Chief Complaint   Patient presents with    Wrist Pain     Patient presents with left wrist pain starting 3 days ago after lifting up a cooler, she reports that the pain is getting worse, denies bruising but does have swelling.           History of Present Illness     Patient is a 38-year-old female who presents with left wrist pain.  States she injured it 3 days ago while pushing up her cooler.  Reports pain along distal ulna.  Denies previous injuries or surgeries.  States she is left-handed.  Reports numbness and tingling in fingers    Wrist Pain   Associated symptoms include numbness.       Review of Systems     Review of Systems   Musculoskeletal:  Positive for arthralgias and joint swelling.   Skin:  Negative for wound.   Neurological:  Positive for numbness.   Hematological:  Does not bruise/bleed easily.   All other systems reviewed and are negative.        Current Medications        Current Outpatient Medications:     ketorolac (TORADOL) 10 mg tablet, Take 1 tablet (10 mg total) by mouth every 6 (six) hours as needed for moderate pain, Disp: 14 tablet, Rfl: 0    levonorgestrel (MIRENA) 20 MCG/24HR IUD, 1 each by Intrauterine route once , Disp: , Rfl:     ondansetron (ZOFRAN) 4 mg tablet, Take 1 tablet (4 mg total) by mouth every 8 (eight) hours as needed for vomiting or nausea for up to 7 doses, Disp: 7 tablet, Rfl: 0    Current Allergies     Allergies as of 07/23/2024 - Reviewed 07/23/2024   Allergen Reaction Noted    Pollen extract  09/11/2013    Nickel Rash 04/16/2019              The following portions of the patient's history were reviewed and updated as appropriate: allergies, current medications, past family history, past medical history, past social history, past surgical history and problem list.     Past Medical History:   Diagnosis Date    Abnormal Pap smear of cervix     Adnexal fullness     Amenorrhea     Anxiety     Arthralgia of toe     Endometriosis 11/17/2020    Exposure to parvovirus     GERD (gastroesophageal reflux disease)     during pregnancy    HPV (human papilloma virus) infection     Nephrolithiasis     Umbilical hernia     Urinary tract infection        Past Surgical History:   Procedure Laterality Date    CERVICAL BIOPSY  W/ LOOP ELECTRODE EXCISION      COLPOSCOPY W/ BIOPSY / CURETTAGE      OSTEOTOMY AND ULNAR SHORTENING Right     IL LAPS ABD PRTM&OMENTUM DX W/WO SPEC BR/WA SPX Bilateral 4/16/2019    Procedure: DIAGNOSTIC LAP; BILATERAL SALPINGECTOMY; OVARIAN CYSTECTOMY, ECSISION PELVIC CYST;  Surgeon: Michel Machado MD;  Location: [facility];  Service: Gynecology    TOOTH EXTRACTION      [place] teeth    WRIST SURGERY         Family History   Problem Relation Age of Onset    Diabetes Maternal Grandmother     Heart disease Paternal Grandfather     Hypertension Paternal Grandfather     Hyperlipidemia Paternal Grandfather     Hypertension Father     Hyperlipidemia  "Father     Heart disease Maternal Grandfather     Diabetes Paternal Grandmother     Diabetes Family          Medications have been verified.        Objective     /75   Pulse 87   Temp 97.9 °F (36.6 °C) (Tympanic)   Resp 18   Ht 5' 2\" (1.575 m)   Wt 56 kg (123 lb 6.4 oz)   SpO2 99%   BMI 22.57 kg/m²   No LMP recorded.         Physical Exam     Physical Exam  Vitals and nursing note reviewed.   Constitutional:       General: She is not in acute distress.     Appearance: Normal appearance. She is not ill-appearing, toxic-appearing or diaphoretic.   Musculoskeletal:      Left wrist: Swelling, tenderness and bony tenderness present. No effusion, lacerations or crepitus. Decreased range of motion. Normal pulse.        Hands:    Skin:     General: Skin is warm.      Capillary Refill: Capillary refill takes less than 2 seconds.   Neurological:      Mental Status: She is alert.   Psychiatric:         Mood and Affect: Mood normal.         Behavior: Behavior normal.         Thought Content: Thought content normal.         Judgment: Judgment normal.     Orthopedic injury treatment    Date/Time: 7/23/2024 3:30 PM    Performed by: CHIKIS Rouse  Authorized by: CHIKIS Rouse    Patient Location:  [facility] Protocol:  Consent: Verbal consent obtained.  Risks and benefits: risks, benefits and alternatives were discussed  Consent given by: patient  Patient understanding: patient states understanding of the procedure being performed  Radiology Images displayed and confirmed. If images not available, report reviewed: imaging studies available    Injury location:  Wrist  Location details:  Left wrist  Injury type:  Soft tissue  Neurovascular status: Neurovascularly intact    Distal perfusion: normal    Neurological function: normal    Range of motion: reduced    Immobilization:  Splint  Splint type: Quick fit left wrist w.t.o.  Neurovascular status: Neurovascularly intact    Distal perfusion: " normal    Neurological function: normal    Range of motion: unchanged    Patient tolerance:  Patient tolerated the procedure well with no immediate complications

## 2024-07-23 NOTE — PATIENT INSTRUCTIONS
The final xray result will appear in your mychart; use the brace until you get the xray result, if the final xray shows a fracture, keep the brace on until you follow-up with orthopedics, if the xray shows no fracture, you can use the brace as needed; take off every 1-2 hours and can take it off to sleep and shower if there is no fracture.   The final result of the xray will appear in your my chart.  Ice as needed.  Rest.  Elevate.  Acetaminophen or ibuprofen for pain.  Follow-up with orthopedics.   PCP follow-up in 3-5 days  Proceed to the ER if symptoms worsen.

## 2024-09-09 NOTE — PROGRESS NOTES
AMB US Pelvic Non OB    Date/Time: 9/10/2024 3:30 PM    Performed by: Jeny Liu  Authorized by: Michel Machado MD  Universal Protocol:  Consent: Verbal consent obtained.  Consent given by: patient  Timeout called at: 9/10/2024 3:30 PM.  Patient understanding: patient states understanding of the procedure being performed  Patient identity confirmed: verbally with patient    Procedure details:     SIS Procedure: No    Indications: ovarian cysts      Technique:  Transvaginal US, Non-OB    Position: lithotomy exam    Uterine findings:     Length (cm): 8.17    Height (cm):  3.47    Width (cm):  4.21    Endometrial stripe: identified      Endometrium thickness (mm):  7.4  Left ovary findings:     Left ovary:  Visualized    Length (cm): 3.21    Height (cm): 1.71    Width (cm): 2.36  Right ovary findings:     Right ovary:  Visualized    Length (cm): 3.63    Height (cm): 1.92    Width (cm): 2.71  Other findings:     Free pelvic fluid: not identified      Free peritoneal fluid: not identified    Post-Procedure Details:     Impression:  Anteverted uterus demonstrates an IUD in good position within the endometrium. The uterus and bilateral ovaries appear within normal limits.The left ovary demonstrates a 1.9cm follicle. No free fluid.     Tolerance:  Tolerated well, no immediate complications    Complications: no complications    Additional Procedure Comments:      [employer] F8 E8C-RS transvaginal transducer Serial # [id number] was used to perform the examination today and subsequently followed with high level disinfection utilizing Trophon EPR procedure.     Ultrasound performed at:     [facility]  [address] [place] [address]  Phone:  [phone]  Fax:  [phone]

## 2024-09-10 ENCOUNTER — OFFICE VISIT (OUTPATIENT)
Dept: GYNECOLOGY | Facility: CLINIC | Age: 39
End: 2024-09-10
Payer: COMMERCIAL

## 2024-09-10 ENCOUNTER — ULTRASOUND (OUTPATIENT)
Dept: GYNECOLOGY | Facility: CLINIC | Age: 39
End: 2024-09-10
Payer: COMMERCIAL

## 2024-09-10 VITALS — DIASTOLIC BLOOD PRESSURE: 62 MMHG | SYSTOLIC BLOOD PRESSURE: 104 MMHG | BODY MASS INDEX: 23.41 KG/M2 | WEIGHT: 128 LBS

## 2024-09-10 DIAGNOSIS — N83.201 RIGHT OVARIAN CYST: Primary | ICD-10-CM

## 2024-09-10 DIAGNOSIS — Z97.5 IUD CONTRACEPTION: ICD-10-CM

## 2024-09-10 PROBLEM — N91.4 SECONDARY OLIGOMENORRHEA: Status: RESOLVED | Noted: 2019-04-30 | Resolved: 2024-09-10

## 2024-09-10 PROCEDURE — 76830 TRANSVAGINAL US NON-OB: CPT | Performed by: OBSTETRICS & GYNECOLOGY

## 2024-09-10 PROCEDURE — 99212 OFFICE O/P EST SF 10 MIN: CPT | Performed by: OBSTETRICS & GYNECOLOGY

## 2024-09-10 NOTE — PROGRESS NOTES
Assessment & Plan   Diagnoses and all orders for this visit:    Right ovarian cyst    IUD contraception    1.  Resolved right ovarian cyst-had episode of severe pain back/8/24.  At that time 3.7 cm cyst and adjacent 3.1 cm cyst with echogenic nodule noted.  Had follow-up ultrasound 6/13/2024 with resolution of the 3.7 cm cyst and decrease to 2.5 cm cyst still with echogenic nodule in it.  Today, the cyst has entirely resolved.  Patient is relieved to hear this.  She denies any current symptoms.  She will call return with any issues.  2. Mirena IUD-status post removal with reinsertion 10/31/2023, previous Mirena was placed 6/26/2017.  IUD again is in good position on ultrasound today and exam previously.  3.  History of irregular bleeding-decrease bleeding noted on Mirena, still notes monthly cycles lasting up to 3 days, 2 of which are very light.  4. history of HPV/low-grade JATIN-Pap was negative with positive HPV 3/30/2023.  Colposcopy 4/6/2023 was with low-grade changes.  Repeat Pap with HPV was negative from 4/2/2024 visit.  5. prior history of condyloma/labial cyst  6. contraception-laparoscopic bilateral salpingectomy  7.  History of endometriosis-noted with ovarian cyst which was removed with bilateral salpingectomy from April 2019.  Denies any current symptoms  8.  Prior breast tenderness-denies issues  Follow-up as scheduled April 2025 for yearly exam or as needed.    Subjective   Patient ID: Radha Olivas is a 39 y.o. female.    Vitals:    09/10/24 1548   BP: 104/62     HPI    The following portions of the patient's history were reviewed and updated as appropriate: allergies, current medications, past family history, past medical history, past social history, past surgical history, and problem list.  Past Medical History:   Diagnosis Date    Abnormal Pap smear of cervix     Adnexal fullness     Amenorrhea     Anxiety     Arthralgia of toe     Endometriosis 11/17/2020    Exposure to parvovirus     GERD  (gastroesophageal reflux disease)     during pregnancy    HPV (human papilloma virus) infection     Nephrolithiasis     Umbilical hernia     Urinary tract infection      Past Surgical History:   Procedure Laterality Date    CERVICAL BIOPSY  W/ LOOP ELECTRODE EXCISION      COLPOSCOPY W/ BIOPSY / CURETTAGE      OSTEOTOMY AND ULNAR SHORTENING Right     VT LAPS ABD PRTM&OMENTUM DX W/WO SPEC BR/WA SPX Bilateral 2019    Procedure: DIAGNOSTIC LAP; BILATERAL SALPINGECTOMY; OVARIAN CYSTECTOMY, ECSISION PELVIC CYST;  Surgeon: Michel Machado MD;  Location[facility];  Service: Gynecology    TOOTH EXTRACTION      [place] teeth    WRIST SURGERY       OB History    Para Term  AB Living   2 2 2 0 0 2   SAB IAB Ectopic Multiple Live Births   0 0 0 0 2      # Outcome Date GA Lbr Kennedy/2nd Weight Sex Type Anes PTL Lv   2 Term 17 38w2d / 00:06 3515 g (7 lb 12 oz) M Vag-Spont None N YUSUF   1 Term                Current Outpatient Medications:     ketorolac (TORADOL) 10 mg tablet, Take 1 tablet (10 mg total) by mouth every 6 (six) hours as needed for moderate pain, Disp: 14 tablet, Rfl: 0    levonorgestrel (MIRENA) 20 MCG/24HR IUD, 1 each by Intrauterine route once , Disp: , Rfl:     ondansetron (ZOFRAN) 4 mg tablet, Take 1 tablet (4 mg total) by mouth every 8 (eight) hours as needed for vomiting or nausea for up to 7 doses, Disp: 7 tablet, Rfl: 0  Allergies   Allergen Reactions    Pollen Extract     Nickel Rash     Some earrings will get rash and very sore     Social History     Socioeconomic History    Marital status: /Civil Union     Spouse name: None    Number of children: None    Years of education: None    Highest education level: None   Occupational History    None   Tobacco Use    Smoking status: Never    Smokeless tobacco: Never   Vaping Use    Vaping status: Never Used   Substance and Sexual Activity    Alcohol use: Not Currently     Comment: occasional    Drug use: No    Sexual activity: Yes      Partners: Male   Other Topics Concern    None   Social History Narrative    Caffeine use    Contraceptive vaginal ring    One child    Jew affiliation- [employer]     uses safety equipment- seatbelt     Social Determinants of Health     Financial Resource Strain: Not on file   Food Insecurity: Not on file   Transportation Needs: Not on file   Physical Activity: Not on file   Stress: Not on file   Social Connections: Unknown (6/18/2024)    Received from [employer]     How often do you feel lonely or isolated from those around you? (Adult - for ages 18 years and over): Not on file   Intimate Partner Violence: Not on file   Housing Stability: Not on file     Family History   Problem Relation Age of Onset    Diabetes Maternal Grandmother     Heart disease Paternal Grandfather     Hypertension Paternal Grandfather     Hyperlipidemia Paternal Grandfather     Hypertension Father     Hyperlipidemia Father     Heart disease Maternal Grandfather     Diabetes Paternal Grandmother     Diabetes Family        Review of Systems    Objective   Physical Exam  OBGyn Exam     Objective      /62 (BP Location: Left arm, Patient Position: Sitting)   Wt 58.1 kg (128 lb)   LMP 08/20/2024   BMI 23.41 kg/m²     General:   alert and oriented, in no acute distress   Neck:    Breast:    Heart:    Lungs:    Abdomen: Nontender   Vulva:    Vagina:    Cervix:    Uterus:    Adnexa:    Rectum:     Psych:  Normal mood and affect   Skin:  Without obvious lesions   Eyes: symmetric, with normal movements and reactivity   Musculoskeletal:  Normal muscle tone and movements appreciated

## 2024-12-24 ENCOUNTER — TELEMEDICINE (OUTPATIENT)
Dept: FAMILY MEDICINE CLINIC | Facility: CLINIC | Age: 39
End: 2024-12-24
Payer: COMMERCIAL

## 2024-12-24 DIAGNOSIS — R30.0 DYSURIA: Primary | ICD-10-CM

## 2024-12-24 LAB
BACTERIA UR QL AUTO: ABNORMAL /HPF
BILIRUB UR QL STRIP: NEGATIVE
CLARITY UR: CLEAR
COLOR UR: ABNORMAL
GLUCOSE UR STRIP-MCNC: NEGATIVE MG/DL
HGB UR QL STRIP.AUTO: NEGATIVE
KETONES UR STRIP-MCNC: NEGATIVE MG/DL
LEUKOCYTE ESTERASE UR QL STRIP: ABNORMAL
MUCOUS THREADS UR QL AUTO: ABNORMAL
NITRITE UR QL STRIP: NEGATIVE
NON-SQ EPI CELLS URNS QL MICRO: ABNORMAL /HPF
PH UR STRIP.AUTO: 6 [PH]
PROT UR STRIP-MCNC: NEGATIVE MG/DL
RBC #/AREA URNS AUTO: ABNORMAL /HPF
SP GR UR STRIP.AUTO: 1.02 (ref 1–1.03)
UROBILINOGEN UR STRIP-ACNC: <2 MG/DL
WBC #/AREA URNS AUTO: ABNORMAL /HPF

## 2024-12-24 PROCEDURE — 99213 OFFICE O/P EST LOW 20 MIN: CPT | Performed by: FAMILY MEDICINE

## 2024-12-24 PROCEDURE — 87077 CULTURE AEROBIC IDENTIFY: CPT | Performed by: FAMILY MEDICINE

## 2024-12-24 PROCEDURE — 87186 SC STD MICRODIL/AGAR DIL: CPT | Performed by: FAMILY MEDICINE

## 2024-12-24 PROCEDURE — 81001 URINALYSIS AUTO W/SCOPE: CPT | Performed by: FAMILY MEDICINE

## 2024-12-24 PROCEDURE — 87086 URINE CULTURE/COLONY COUNT: CPT | Performed by: FAMILY MEDICINE

## 2024-12-24 NOTE — PROGRESS NOTES
Virtual Regular Visit  Name: Radha Olivas      : 1985      MRN: [id number]  Encounter Provider: Betsy Bernard DO  Encounter Date: 2024   Encounter department: [facility]      Verification of patient location:  Patient is located at Home in the following state in which I hold an active license [place] :  Assessment & Plan  Dysuria    Patient with signs and symptoms of dysuria concerning for possible UTI.  Recommend further evaluation with urinalysis and urine culture.  Patient plans to complete a sample at our office at her convenience today.  Consideration for empiric treatment pending results.    Orders:  •  Urinalysis with microscopic; Future  •  Urine culture; Future        Encounter provider Betsy Bernard DO    The patient was identified by name and date of birth. Radha Olivas was informed that this is a telemedicine visit and that the visit is being conducted through the Epic Embedded platform. She agrees to proceed..  My office door was closed. No one else was in the room.  She acknowledged consent and understanding of privacy and security of the video platform. The patient has agreed to participate and understands they can discontinue the visit at any time.    Patient is aware this is a billable service.     History of Present Illness     Ifrah is a 39-year-old female with past medical history of JUDE-3, CHRIS who presents today for evaluation regarding possible UTI.    Notes recent return approximately 4 days ago from [place].  Notes that since her trip she has been struggling with dysuria, frequency, urgency.  She denies associated fevers.  She does note some pelvic discomfort without flank pain.  She denies vaginal itching or discharge.  She notes concern for a shared hot tub as a risk factor for this.      Review of Systems   Constitutional:  Negative for fever.   Genitourinary:  Positive for dysuria, frequency and urgency. Negative for flank pain and vaginal discharge.        Objective   There were no vitals taken for this visit.    Physical Exam  Constitutional:       General: She is not in acute distress.     Appearance: Normal appearance.   HENT:      Right Ear: External ear normal.      Left Ear: External ear normal.      Mouth/Throat:      Mouth: Mucous membranes are moist.   Eyes:      General: No scleral icterus.        Right eye: No discharge.         Left eye: No discharge.      Conjunctiva/sclera: Conjunctivae normal.   Pulmonary:      Comments: No conversational dyspnea noted  Musculoskeletal:      Cervical back: Normal range of motion.   Neurological:      Mental Status: She is alert.   Psychiatric:         Mood and Affect: Mood normal.         Behavior: Behavior normal.         Visit Time  Total Visit Duration: 15

## 2024-12-26 ENCOUNTER — TELEPHONE (OUTPATIENT)
Age: 39
End: 2024-12-26

## 2024-12-26 LAB — BACTERIA UR CULT: ABNORMAL

## 2024-12-26 NOTE — TELEPHONE ENCOUNTER
Patient called again upset that an antibiotic was not sent in for her  yet. Asking for help with this ASAP.

## 2024-12-26 NOTE — TELEPHONE ENCOUNTER
Patient calling due to her UTI symptoms and urine results. Asking for antibiotic to be sent for her asap.

## 2024-12-27 ENCOUNTER — TELEPHONE (OUTPATIENT)
Age: 39
End: 2024-12-27

## 2024-12-27 ENCOUNTER — RESULTS FOLLOW-UP (OUTPATIENT)
Dept: FAMILY MEDICINE CLINIC | Facility: CLINIC | Age: 39
End: 2024-12-27

## 2024-12-27 DIAGNOSIS — N39.0 URINARY TRACT INFECTION WITHOUT HEMATURIA, SITE UNSPECIFIED: Primary | ICD-10-CM

## 2024-12-27 RX ORDER — SULFAMETHOXAZOLE AND TRIMETHOPRIM 800; 160 MG/1; MG/1
1 TABLET ORAL EVERY 12 HOURS SCHEDULED
Qty: 6 TABLET | Refills: 0 | Status: SHIPPED | OUTPATIENT
Start: 2024-12-27 | End: 2024-12-30

## 2024-12-27 NOTE — TELEPHONE ENCOUNTER
Patient calling in stating that she's been to her PCP doctor for possible UTI, and pt stating that she called PCP office to see if she can get antibiotics for UTI 4 times and the providers haven't returned phone call or prescribed medication. Pt stating the results are in her chart and would like to know if Dr Machado can give antibiotics  as pt has had kidney infections in the past. Pharmacy is confirmed.

## 2024-12-27 NOTE — TELEPHONE ENCOUNTER
Patient called in regards to traveling today and she will be leaving at 12:30, patient is requesting medication be sent into the pharmacy before then.

## 2024-12-27 NOTE — PROGRESS NOTES
Patient called with confirmed UTI.  Unable to reach primary care provider.  Bactrim DS twice daily for 3 days time sent to local pharmacy.

## 2024-12-27 NOTE — TELEPHONE ENCOUNTER
RN placed a call to patient with update. Advised if develops a fever while on abx - go to UC or ER. Pt verbalized an understanding and is thankful.

## 2025-04-02 ENCOUNTER — OFFICE VISIT (OUTPATIENT)
Dept: FAMILY MEDICINE CLINIC | Facility: CLINIC | Age: 40
End: 2025-04-02
Payer: COMMERCIAL

## 2025-04-02 VITALS
WEIGHT: 126 LBS | OXYGEN SATURATION: 98 % | TEMPERATURE: 97.7 F | BODY MASS INDEX: 23.19 KG/M2 | DIASTOLIC BLOOD PRESSURE: 72 MMHG | HEIGHT: 62 IN | RESPIRATION RATE: 16 BRPM | SYSTOLIC BLOOD PRESSURE: 114 MMHG | HEART RATE: 84 BPM

## 2025-04-02 DIAGNOSIS — J02.9 PHARYNGITIS, UNSPECIFIED ETIOLOGY: Primary | ICD-10-CM

## 2025-04-02 DIAGNOSIS — J02.9 SORE THROAT: ICD-10-CM

## 2025-04-02 LAB — S PYO AG THROAT QL: NEGATIVE

## 2025-04-02 PROCEDURE — 99213 OFFICE O/P EST LOW 20 MIN: CPT | Performed by: FAMILY MEDICINE

## 2025-04-02 PROCEDURE — 87880 STREP A ASSAY W/OPTIC: CPT | Performed by: FAMILY MEDICINE

## 2025-04-02 RX ORDER — AZITHROMYCIN 250 MG/1
TABLET, FILM COATED ORAL
Qty: 6 TABLET | Refills: 0 | Status: SHIPPED | OUTPATIENT
Start: 2025-04-02 | End: 2025-04-06

## 2025-04-02 NOTE — PROGRESS NOTES
":  Assessment & Plan  Pharyngitis, unspecified etiology  Rapid strep test is negative.  Patient started on a Z-Edgardo and may take Tylenol or Motrin as needed.  Recommend increase fluids, gargle with salt water and rest.  Return to the office in 1 week or call sooner as needed.  Orders:    azithromycin (ZITHROMAX) 250 mg tablet; Take 2 tablets today then 1 tablet daily x 4 days    Sore throat    Orders:    POCT rapid ANTIGEN strepA        History of Present Illness     Radha Olivas is a 39 y.o. female   Patient complains of sore throat and painful swallowing for the past 3 to 4 days.  She admits to fever to 101.4 last night.  Patient denies rash.  She has treated this with Claritin and DayQuil without relief.    Sore Throat   This is a new problem. The current episode started in the past 7 days. The problem has been gradually worsening. The maximum temperature recorded prior to her arrival was 101 - 101.9 F. Associated symptoms include ear pain, headaches and trouble swallowing. Pertinent negatives include no congestion, coughing, hoarse voice or swollen glands. She has had no exposure to strep or mono. Treatments tried: claritin, dayquil. The treatment provided no relief.   Fever  Associated symptoms include headaches and a sore throat. Pertinent negatives include no congestion, coughing or swollen glands.   Earache   Associated symptoms include headaches and a sore throat. Pertinent negatives include no coughing.     Review of Systems   HENT:  Positive for ear pain, sore throat and trouble swallowing. Negative for congestion and hoarse voice.    Respiratory:  Negative for cough.    Neurological:  Positive for headaches.     Objective   /72 (BP Location: Left arm, Patient Position: Sitting, Cuff Size: Standard)   Pulse 84   Temp 97.7 °F (36.5 °C) (Tympanic)   Resp 16   Ht 5' 2\" (1.575 m)   Wt 57.2 kg (126 lb)   SpO2 98%   BMI 23.05 kg/m²      Physical Exam  Constitutional:       General: She is not in " acute distress.     Appearance: She is well-developed. She is not ill-appearing, toxic-appearing or diaphoretic.   HENT:      Head: Normocephalic.      Right Ear: Tympanic membrane normal.      Left Ear: Tympanic membrane normal.      Nose: No congestion.      Mouth/Throat:      Mouth: Mucous membranes are moist.      Pharynx: Posterior oropharyngeal erythema present. No oropharyngeal exudate.   Eyes:      Conjunctiva/sclera: Conjunctivae normal.   Cardiovascular:      Rate and Rhythm: Normal rate and regular rhythm.   Pulmonary:      Effort: Pulmonary effort is normal.      Breath sounds: Normal breath sounds.   Abdominal:      General: Bowel sounds are normal.      Palpations: Abdomen is soft.   Musculoskeletal:      Cervical back: Neck supple.   Lymphadenopathy:      Cervical: No cervical adenopathy.   Skin:     General: Skin is warm and dry.   Neurological:      General: No focal deficit present.      Mental Status: She is alert and oriented to person, place, and time.   Psychiatric:         Mood and Affect: Mood normal.         Behavior: Behavior normal.

## 2025-04-10 ENCOUNTER — OFFICE VISIT (OUTPATIENT)
Dept: FAMILY MEDICINE CLINIC | Facility: CLINIC | Age: 40
End: 2025-04-10
Payer: COMMERCIAL

## 2025-04-10 VITALS
HEART RATE: 84 BPM | TEMPERATURE: 97.8 F | DIASTOLIC BLOOD PRESSURE: 76 MMHG | BODY MASS INDEX: 23.37 KG/M2 | HEIGHT: 62 IN | OXYGEN SATURATION: 99 % | SYSTOLIC BLOOD PRESSURE: 110 MMHG | WEIGHT: 127 LBS | RESPIRATION RATE: 16 BRPM

## 2025-04-10 DIAGNOSIS — R05.1 ACUTE COUGH: Primary | ICD-10-CM

## 2025-04-10 PROCEDURE — 99213 OFFICE O/P EST LOW 20 MIN: CPT | Performed by: FAMILY MEDICINE

## 2025-04-10 NOTE — PROGRESS NOTES
:  Assessment & Plan  Acute cough  Probable viral syndrome.  Discussed treatment options including Medrol Dosepak although patient prefers to hold off at this time.  Patient may take Robitussin or Mucinex as needed and may take Tessalon Perles as needed.  Recommend increased fluids and rest.  Return to the office in 1 week or call sooner as needed.  Discussed further testing if symptoms persist.           History of Present Illness     Radha Olivas is a 39 y.o. female   Patient finished Z-Edgardo and her sore throat has resolved.  She complains of ongoing fatigue, low-grade fever to 100.1 and nonproductive cough.  Patient denies rash or swollen glands.  Patient had mono as a child.  She has treated this with Tylenol and Tessalon Perles with relief.    Cough  This is a new problem. The current episode started in the past 7 days. The problem has been unchanged. The cough is Non-productive. Associated symptoms include chills, a fever, headaches and sweats. Pertinent negatives include no ear pain, hemoptysis, myalgias, nasal congestion, postnasal drip, rash, sore throat, shortness of breath or wheezing. Associated symptoms comments: Fatigue  . Treatments tried: zpak, tylenol, tessalon. The treatment provided moderate relief. There is no history of asthma.   Fatigue  Associated symptoms include chills, coughing, fatigue, a fever and headaches. Pertinent negatives include no myalgias, rash or sore throat.   Headache    Review of Systems   Constitutional:  Positive for chills, fatigue and fever.   HENT:  Negative for ear pain, postnasal drip and sore throat.    Respiratory:  Positive for cough. Negative for hemoptysis, shortness of breath and wheezing.    Musculoskeletal:  Negative for myalgias.   Skin:  Negative for rash.   Neurological:  Positive for headaches.     Objective   /76 (BP Location: Left arm, Patient Position: Sitting, Cuff Size: Standard)   Pulse 84   Temp 97.8 °F (36.6 °C) (Tympanic)   Resp 16   Ht 5'  "2\" (1.575 m)   Wt 57.6 kg (127 lb)   SpO2 99%   BMI 23.23 kg/m²      Physical Exam  Constitutional:       General: She is not in acute distress.     Appearance: Normal appearance. She is not ill-appearing, toxic-appearing or diaphoretic.   HENT:      Head: Normocephalic.      Right Ear: Tympanic membrane normal.      Left Ear: Tympanic membrane normal.      Nose: Nose normal.      Mouth/Throat:      Mouth: Mucous membranes are moist.      Pharynx: Oropharynx is clear. No oropharyngeal exudate or posterior oropharyngeal erythema.   Eyes:      General: No scleral icterus.     Conjunctiva/sclera: Conjunctivae normal.   Cardiovascular:      Rate and Rhythm: Normal rate and regular rhythm.   Pulmonary:      Effort: Pulmonary effort is normal.      Breath sounds: Normal breath sounds.   Abdominal:      Palpations: Abdomen is soft.      Tenderness: There is no abdominal tenderness.   Musculoskeletal:      Cervical back: Neck supple.      Right lower leg: No edema.      Left lower leg: No edema.   Lymphadenopathy:      Cervical: No cervical adenopathy.   Skin:     General: Skin is warm and dry.   Neurological:      General: No focal deficit present.      Mental Status: She is alert and oriented to person, place, and time.   Psychiatric:         Mood and Affect: Mood normal.         Behavior: Behavior normal.         Thought Content: Thought content normal.         Judgment: Judgment normal.           "

## (undated) DEVICE — TROCAR: Brand: KII FIOS FIRST ENTRY

## (undated) DEVICE — GLOVE PI ULTRA TOUCH SZ.8.0

## (undated) DEVICE — INTENDED FOR TISSUE SEPARATION, AND OTHER PROCEDURES THAT REQUIRE A SHARP SURGICAL BLADE TO PUNCTURE OR CUT.: Brand: BARD-PARKER SAFETY BLADES SIZE 11, STERILE

## (undated) DEVICE — ASTOUND STANDARD SURGICAL GOWN, XL: Brand: CONVERTORS

## (undated) DEVICE — SCD SEQUENTIAL COMPRESSION COMFORT SLEEVE MEDIUM KNEE LENGTH: Brand: KENDALL SCD

## (undated) DEVICE — 3M™ STERI-STRIP™ REINFORCED ADHESIVE SKIN CLOSURES, R1547, 1/2 IN X 4 IN (12 MM X 100 MM), 6 STRIPS/ENVELOPE: Brand: 3M™ STERI-STRIP™

## (undated) DEVICE — PLASTIC ADHESIVE BANDAGE: Brand: CURITY

## (undated) DEVICE — PREMIUM DRY TRAY LF: Brand: MEDLINE INDUSTRIES, INC.

## (undated) DEVICE — GLOVE INDICATOR PI UNDERGLOVE SZ 7 BLUE

## (undated) DEVICE — CHLORAPREP HI-LITE 26ML ORANGE

## (undated) DEVICE — TROCAR: Brand: KII SLEEVE

## (undated) DEVICE — TELFA NON-ADHERENT ABSORBENT DRESSING: Brand: TELFA

## (undated) DEVICE — BLUE HEAT SCOPE WARMER

## (undated) DEVICE — DRAPE EQUIPMENT RF WAND

## (undated) DEVICE — GLOVE INDICATOR PI UNDERGLOVE SZ 7.5 BLUE

## (undated) DEVICE — [HIGH FLOW INSUFFLATOR,  DO NOT USE IF PACKAGE IS DAMAGED,  KEEP DRY,  KEEP AWAY FROM SUNLIGHT,  PROTECT FROM HEAT AND RADIOACTIVE SOURCES.]: Brand: PNEUMOSURE

## (undated) DEVICE — SUT VICRYL 4-0 PS-2 27 IN J426H

## (undated) DEVICE — GLOVE INDICATOR PI UNDERGLOVE SZ 8 BLUE

## (undated) DEVICE — GLOVE PI ULTRA TOUCH SZ.7.0

## (undated) DEVICE — GLOVE PI ULTRA TOUCH SZ.6.5

## (undated) DEVICE — ENDOPATH 5MM CURVED SCISSORS WITH MONOPOLAR CAUTERY: Brand: ENDOPATH

## (undated) DEVICE — GLOVE PI ULTRA TOUCH SZ.7.5

## (undated) DEVICE — PVC URETHRAL CATHETER: Brand: DOVER

## (undated) DEVICE — BETHLEHEM UNIVERSAL GYN LAP PK: Brand: CARDINAL HEALTH